# Patient Record
Sex: FEMALE | Race: WHITE | Employment: OTHER | URBAN - NONMETROPOLITAN AREA
[De-identification: names, ages, dates, MRNs, and addresses within clinical notes are randomized per-mention and may not be internally consistent; named-entity substitution may affect disease eponyms.]

---

## 2017-04-13 ENCOUNTER — HOSPITAL ENCOUNTER (OUTPATIENT)
Dept: PHARMACY | Age: 67
Setting detail: THERAPIES SERIES
Discharge: HOME | End: 2017-04-13
Payer: MEDICARE

## 2017-04-13 VITALS
SYSTOLIC BLOOD PRESSURE: 156 MMHG | WEIGHT: 280 LBS | DIASTOLIC BLOOD PRESSURE: 84 MMHG | HEART RATE: 53 BPM | BODY MASS INDEX: 48.39 KG/M2

## 2017-04-13 DIAGNOSIS — Z79.01 LONG TERM CURRENT USE OF ANTICOAGULANT THERAPY: ICD-10-CM

## 2017-04-13 DIAGNOSIS — I26.99 OTHER PULMONARY EMBOLISM WITHOUT ACUTE COR PULMONALE, UNSPECIFIED CHRONICITY (HCC): ICD-10-CM

## 2017-04-13 DIAGNOSIS — I82.4Z9 DEEP VEIN THROMBOSIS (DVT) OF DISTAL VEIN OF LOWER EXTREMITY, UNSPECIFIED CHRONICITY, UNSPECIFIED LATERALITY (HCC): ICD-10-CM

## 2017-04-13 LAB — INR BLD: 2.6

## 2017-04-13 PROCEDURE — G0463 HOSPITAL OUTPT CLINIC VISIT: HCPCS

## 2017-04-13 PROCEDURE — 85610 PROTHROMBIN TIME: CPT

## 2017-05-02 PROBLEM — E66.01 MORBID OBESITY DUE TO EXCESS CALORIES (HCC): Status: ACTIVE | Noted: 2017-05-02

## 2017-05-24 ENCOUNTER — HOSPITAL ENCOUNTER (OUTPATIENT)
Dept: LAB | Age: 67
Discharge: HOME | End: 2017-05-24
Payer: MEDICARE

## 2017-05-24 ENCOUNTER — HOSPITAL ENCOUNTER (OUTPATIENT)
Dept: PHARMACY | Age: 67
Setting detail: THERAPIES SERIES
Discharge: HOME | End: 2017-05-24
Payer: MEDICARE

## 2017-05-24 VITALS — SYSTOLIC BLOOD PRESSURE: 154 MMHG | HEART RATE: 54 BPM | DIASTOLIC BLOOD PRESSURE: 99 MMHG

## 2017-05-24 DIAGNOSIS — E11.9 TYPE 2 DIABETES MELLITUS WITHOUT COMPLICATION, WITHOUT LONG-TERM CURRENT USE OF INSULIN (HCC): Chronic | ICD-10-CM

## 2017-05-24 DIAGNOSIS — Z79.01 LONG TERM CURRENT USE OF ANTICOAGULANT THERAPY: ICD-10-CM

## 2017-05-24 LAB
-: ABNORMAL
AMORPHOUS: ABNORMAL
ANION GAP SERPL CALCULATED.3IONS-SCNC: 13 MMOL/L (ref 9–17)
BACTERIA: ABNORMAL
BILIRUBIN URINE: NEGATIVE
BUN BLDV-MCNC: 20 MG/DL (ref 8–23)
BUN/CREAT BLD: 25 (ref 9–20)
CALCIUM SERPL-MCNC: 10 MG/DL (ref 8.6–10.4)
CASTS UA: ABNORMAL /LPF
CHLORIDE BLD-SCNC: 99 MMOL/L (ref 98–107)
CO2: 26 MMOL/L (ref 20–31)
COLOR: YELLOW
COMMENT UA: ABNORMAL
CREAT SERPL-MCNC: 0.79 MG/DL (ref 0.5–0.9)
CREATININE URINE: 80.8 MG/DL (ref 28–217)
CRYSTALS, UA: ABNORMAL /HPF
EPITHELIAL CELLS UA: ABNORMAL /HPF (ref 0–25)
ESTIMATED AVERAGE GLUCOSE: 134 MG/DL
GFR AFRICAN AMERICAN: >60 ML/MIN
GFR NON-AFRICAN AMERICAN: >60 ML/MIN
GFR SERPL CREATININE-BSD FRML MDRD: ABNORMAL ML/MIN/{1.73_M2}
GFR SERPL CREATININE-BSD FRML MDRD: ABNORMAL ML/MIN/{1.73_M2}
GLUCOSE BLD-MCNC: 132 MG/DL (ref 70–99)
GLUCOSE URINE: NEGATIVE
HBA1C MFR BLD: 6.3 % (ref 4.8–5.9)
INR BLD: 2
KETONES, URINE: NEGATIVE
LEUKOCYTE ESTERASE, URINE: NEGATIVE
MICROALBUMIN/CREAT 24H UR: <12 MG/L
MICROALBUMIN/CREAT UR-RTO: 15 MCG/MG CREAT
MUCUS: ABNORMAL
NITRITE, URINE: NEGATIVE
OTHER OBSERVATIONS UA: ABNORMAL
PH UA: 6.5 (ref 5–9)
POTASSIUM SERPL-SCNC: 4.5 MMOL/L (ref 3.7–5.3)
PROTEIN UA: NEGATIVE
RBC UA: ABNORMAL /HPF (ref 0–2)
RENAL EPITHELIAL, UA: ABNORMAL /HPF
SODIUM BLD-SCNC: 138 MMOL/L (ref 135–144)
SPECIFIC GRAVITY UA: <1.005 (ref 1.01–1.02)
TRICHOMONAS: ABNORMAL
TURBIDITY: CLEAR
URINE HGB: ABNORMAL
UROBILINOGEN, URINE: NORMAL
WBC UA: ABNORMAL /HPF (ref 0–5)
YEAST: ABNORMAL

## 2017-05-24 PROCEDURE — 99211 OFF/OP EST MAY X REQ PHY/QHP: CPT

## 2017-05-24 PROCEDURE — 36415 COLL VENOUS BLD VENIPUNCTURE: CPT

## 2017-05-24 PROCEDURE — 83036 HEMOGLOBIN GLYCOSYLATED A1C: CPT

## 2017-05-24 PROCEDURE — 85610 PROTHROMBIN TIME: CPT

## 2017-05-24 PROCEDURE — 82570 ASSAY OF URINE CREATININE: CPT

## 2017-05-24 PROCEDURE — 81001 URINALYSIS AUTO W/SCOPE: CPT

## 2017-05-24 PROCEDURE — 82043 UR ALBUMIN QUANTITATIVE: CPT

## 2017-05-24 PROCEDURE — 80048 BASIC METABOLIC PNL TOTAL CA: CPT

## 2017-07-05 ENCOUNTER — HOSPITAL ENCOUNTER (OUTPATIENT)
Dept: PHARMACY | Age: 67
Setting detail: THERAPIES SERIES
Discharge: HOME | End: 2017-07-05
Payer: MEDICARE

## 2017-07-05 VITALS
WEIGHT: 289 LBS | BODY MASS INDEX: 49.95 KG/M2 | HEART RATE: 56 BPM | SYSTOLIC BLOOD PRESSURE: 146 MMHG | DIASTOLIC BLOOD PRESSURE: 72 MMHG

## 2017-07-05 DIAGNOSIS — Z79.01 LONG TERM CURRENT USE OF ANTICOAGULANT THERAPY: ICD-10-CM

## 2017-07-05 LAB — INR BLD: 2.2

## 2017-07-05 PROCEDURE — 85610 PROTHROMBIN TIME: CPT

## 2017-07-05 PROCEDURE — 99211 OFF/OP EST MAY X REQ PHY/QHP: CPT

## 2017-08-16 ENCOUNTER — HOSPITAL ENCOUNTER (OUTPATIENT)
Dept: PHARMACY | Age: 67
Setting detail: THERAPIES SERIES
Discharge: HOME | End: 2017-08-16
Payer: MEDICARE

## 2017-08-16 VITALS
DIASTOLIC BLOOD PRESSURE: 79 MMHG | WEIGHT: 292 LBS | SYSTOLIC BLOOD PRESSURE: 130 MMHG | HEART RATE: 58 BPM | BODY MASS INDEX: 50.47 KG/M2

## 2017-08-16 DIAGNOSIS — Z79.01 LONG TERM CURRENT USE OF ANTICOAGULANT THERAPY: ICD-10-CM

## 2017-08-16 LAB — INR BLD: 1.9

## 2017-08-16 PROCEDURE — 99211 OFF/OP EST MAY X REQ PHY/QHP: CPT

## 2017-08-16 PROCEDURE — 85610 PROTHROMBIN TIME: CPT

## 2017-09-21 ENCOUNTER — HOSPITAL ENCOUNTER (OUTPATIENT)
Dept: PHARMACY | Age: 67
Setting detail: THERAPIES SERIES
Discharge: HOME | End: 2017-09-21
Payer: MEDICARE

## 2017-09-21 ENCOUNTER — HOSPITAL ENCOUNTER (OUTPATIENT)
Dept: WOMENS IMAGING | Age: 67
Discharge: HOME | End: 2017-09-21
Payer: MEDICARE

## 2017-09-21 ENCOUNTER — HOSPITAL ENCOUNTER (OUTPATIENT)
Dept: LAB | Age: 67
Discharge: HOME | End: 2017-09-21
Payer: MEDICARE

## 2017-09-21 VITALS
SYSTOLIC BLOOD PRESSURE: 136 MMHG | WEIGHT: 290 LBS | HEART RATE: 68 BPM | DIASTOLIC BLOOD PRESSURE: 79 MMHG | BODY MASS INDEX: 50.12 KG/M2

## 2017-09-21 DIAGNOSIS — Z12.31 SCREENING MAMMOGRAM, ENCOUNTER FOR: ICD-10-CM

## 2017-09-21 DIAGNOSIS — Z79.01 LONG TERM CURRENT USE OF ANTICOAGULANT THERAPY: ICD-10-CM

## 2017-09-21 DIAGNOSIS — E11.9 TYPE 2 DIABETES MELLITUS WITHOUT COMPLICATION, WITHOUT LONG-TERM CURRENT USE OF INSULIN (HCC): Chronic | ICD-10-CM

## 2017-09-21 DIAGNOSIS — I82.4Y3 ACUTE DEEP VEIN THROMBOSIS (DVT) OF PROXIMAL VEIN OF BOTH LOWER EXTREMITIES (HCC): ICD-10-CM

## 2017-09-21 DIAGNOSIS — I26.99 OTHER PULMONARY EMBOLISM WITHOUT ACUTE COR PULMONALE (HCC): ICD-10-CM

## 2017-09-21 LAB
ESTIMATED AVERAGE GLUCOSE: 154 MG/DL
HBA1C MFR BLD: 7 % (ref 4.8–5.9)
INR BLD: 2.1

## 2017-09-21 PROCEDURE — 85610 PROTHROMBIN TIME: CPT

## 2017-09-21 PROCEDURE — G0202 SCR MAMMO BI INCL CAD: HCPCS

## 2017-09-21 PROCEDURE — 83036 HEMOGLOBIN GLYCOSYLATED A1C: CPT

## 2017-09-21 PROCEDURE — 99211 OFF/OP EST MAY X REQ PHY/QHP: CPT

## 2017-09-21 PROCEDURE — 36415 COLL VENOUS BLD VENIPUNCTURE: CPT

## 2017-10-09 ENCOUNTER — HOSPITAL ENCOUNTER (OUTPATIENT)
Dept: PHARMACY | Age: 67
Setting detail: THERAPIES SERIES
Discharge: HOME | End: 2017-10-09
Payer: MEDICARE

## 2017-10-09 VITALS — DIASTOLIC BLOOD PRESSURE: 72 MMHG | SYSTOLIC BLOOD PRESSURE: 132 MMHG | HEART RATE: 73 BPM

## 2017-10-09 DIAGNOSIS — Z79.01 LONG TERM CURRENT USE OF ANTICOAGULANT THERAPY: ICD-10-CM

## 2017-10-09 LAB — INR BLD: 1.3

## 2017-10-09 PROCEDURE — 99211 OFF/OP EST MAY X REQ PHY/QHP: CPT

## 2017-10-09 PROCEDURE — 85610 PROTHROMBIN TIME: CPT

## 2017-10-09 RX ORDER — ASPIRIN 81 MG/1
325 TABLET ORAL DAILY
COMMUNITY
End: 2018-08-28 | Stop reason: CLARIF

## 2017-10-09 NOTE — PATIENT INSTRUCTIONS
Continue current dose of warfarin as instructed on dosing calendar provided. Please take warfarin 7.5 mg today and 7.5 mg tomorrow, then resume previous dosing. Continue Lovenox 40 mg subcutaneously daily thru Thursday. Return to clinic on Friday, 10/13/17 to recheck INR. Continue to monitor urine and stool for signs and symptoms of bleeding. Please notify the clinic of any medication changes.

## 2017-10-09 NOTE — PROGRESS NOTES
Fingerstick INR drawn per clinic protocol. Patient states no visible blood in urine and no black tarry stool. Denies any missed doses of warfarin. No change in other maintenance medications or in diet. Patient had left total knee replacement on Friday, 10/6/17. Patient is taking Percocet 5/325 - 2 tablets every 4 hrs PRN pain. Patient held warfarin 5 days prior to procedure and has been bridging with Lovenox 40 mg SQ daily. INR is 1.3 today. Patient instructed to take warfarin 7.5 mg today and 7.5 mg tomorrow, then resume 5 mg daily Wednesday and Thursday. Patient discharged after surgery with a 5 day supply of Lovenox 40 mg syringes and was instructed to continue Lovenox daily  through Thursday. Patient will return to clinic on Friday, 10/13/17 to recheck INR.

## 2017-10-11 ENCOUNTER — HOSPITAL ENCOUNTER (OUTPATIENT)
Dept: PHYSICAL THERAPY | Age: 67
Setting detail: THERAPIES SERIES
Discharge: HOME | End: 2017-10-11
Payer: MEDICARE

## 2017-10-11 NOTE — PROGRESS NOTES
[facility]  Inpatient/Observation/Outpatient Rehabilitation    Date: 10/11/2017  Patient Name: Zamzam General       [] Inpatient Acute/Observation       [x]  Outpatient  : 1950       [] Pt no showed for scheduled appointment    [] Pt refused/declined therapy at this time due to:           [x] Pt cancelled due to:  [] No Reason Given   [] Sick/ill   [x] Other:  Home Health office called and Pt will be completing therapy via [facility] at this time. She is unable to arrange transportation for outpatient at this time.       Dell Mayer Date: 10/11/2017

## 2017-10-13 ENCOUNTER — HOSPITAL ENCOUNTER (OUTPATIENT)
Dept: PHARMACY | Age: 67
Setting detail: THERAPIES SERIES
Discharge: HOME | End: 2017-10-13
Payer: MEDICARE

## 2017-10-13 VITALS
SYSTOLIC BLOOD PRESSURE: 115 MMHG | DIASTOLIC BLOOD PRESSURE: 64 MMHG | HEART RATE: 61 BPM | WEIGHT: 285 LBS | BODY MASS INDEX: 49.26 KG/M2

## 2017-10-13 DIAGNOSIS — Z79.01 LONG TERM CURRENT USE OF ANTICOAGULANT THERAPY: ICD-10-CM

## 2017-10-13 LAB — INR BLD: 2.1

## 2017-10-13 PROCEDURE — 99211 OFF/OP EST MAY X REQ PHY/QHP: CPT

## 2017-10-13 PROCEDURE — 85610 PROTHROMBIN TIME: CPT

## 2017-10-13 RX ORDER — HYDROCODONE BITARTRATE AND ACETAMINOPHEN 5; 325 MG/1; MG/1
1 TABLET ORAL EVERY 6 HOURS PRN
COMMUNITY
End: 2017-12-22 | Stop reason: ALTCHOICE

## 2017-10-13 RX ORDER — OXYCODONE HYDROCHLORIDE 5 MG/1
5 TABLET ORAL EVERY 4 HOURS PRN
COMMUNITY
End: 2017-11-10 | Stop reason: ALTCHOICE

## 2017-10-13 NOTE — PATIENT INSTRUCTIONS
Continue to monitor urine and stool. Continue to monitor for signs of bleeding. Return to clinic in 4 weeks.

## 2017-10-30 ENCOUNTER — HOSPITAL ENCOUNTER (OUTPATIENT)
Dept: PHYSICAL THERAPY | Age: 67
Setting detail: THERAPIES SERIES
Discharge: HOME | End: 2017-10-30
Payer: MEDICARE

## 2017-10-30 PROCEDURE — 97110 THERAPEUTIC EXERCISES: CPT

## 2017-10-30 PROCEDURE — G0283 ELEC STIM OTHER THAN WOUND: HCPCS

## 2017-10-30 PROCEDURE — G8978 MOBILITY CURRENT STATUS: HCPCS

## 2017-10-30 PROCEDURE — G8979 MOBILITY GOAL STATUS: HCPCS

## 2017-10-30 PROCEDURE — 97161 PT EVAL LOW COMPLEX 20 MIN: CPT

## 2017-10-30 ASSESSMENT — PAIN DESCRIPTION - LOCATION: LOCATION: KNEE

## 2017-10-30 ASSESSMENT — PAIN SCALES - GENERAL: PAINLEVEL_OUTOF10: 1

## 2017-10-30 ASSESSMENT — PAIN DESCRIPTION - ORIENTATION: ORIENTATION: LEFT

## 2017-10-30 NOTE — PROGRESS NOTES
Phone: [address] [facility]          Fax: [phone]                      Outpatient Physical Therapy                                                                      Evaluation  Date: 11/3/2017  Patient: Stefany Lopez  : 1950  [facility] #: [id number]  Referring Practitioner: Stalin Marie MD    Referral Date : 10/06/17     Diagnosis: s/p L TKA    Treatment Diagnosis: L knee pain, difficulty walking  Onset Date: 10/06/17  PT Insurance Information: Medicare/[employer]  Total # of Visits Approved: 18   Total # of Visits to Date: 1  No Show: 0  Canceled Appointment: 0     Subjective  Subjective: Pt arrives to PT evaluation s/p L TKA, which was performed on 10/6/17. Pt states she received home health therapy up until last week. Pt states she has felt as though she is progressing well. Pt states she was using a walker for the first few days after surgery, but has been using a SPC since. Pt states at home she does not use cane often, but cont to take [employer] out in community. Pt states prior to surgery, she was not using AD. Pt states she RTD . Pt states pain has been minimal, with avg pain level being about 1/10. Additional Pertinent Hx: HTN, DM  Pain Screening  Patient Currently in Pain: Yes  Pain Assessment  Pain Assessment: 0-10  Pain Level: 1  Pain Location: Knee  Pain Orientation: Left          Objective     Observation/Palpation  Observation: Pt ambulates with SPC and antalgic gait. Edema: Mod edema L knee compared bilaterally. Strength LLE  Comment: L hip 4/5, L knee 4/5    Assessment  Assessment: Pt is a 78 y/o female who presents to PT s/p L TKA, which was performed on 10/6/17. Pt currently ambulating with SPC and antalgic gait. Pt able to achieve L knee AROM (-)5-103 degrees. L hip and knee 4/5 with MMT.  However, pt demo difficulty with sit to stand transfer and requires use of B UE to assist. Pt will benefit from skilled PT services to address the above impairments and to work toward the established functional goals. Prognosis: Good        Decision Making: Low Complexity    Patient Education  PT POC and HEP. Pt verbalized/demonstrated good understanding:     [x] Yes         [] No, pt required further clarification. [x] Primary Impairment :   G Code:    [x] Mobility         [] Carry        [] Body Position       [] Self Care      [] Other:   Functional Impairment Current:  [] 0%    [] 1-19% [x] 20-39% [] 40-59% [] 60-79%    [] 80-99% [] 100%  Functional Impairment Goal:  [x] 0%    [] 1-19% [] 20-39% [] 40-59% [] 60-79%    [] 80-99% [] 100%  G Code Functional Impairment determined by:  [x] Clinical Judgment   [] Outcome Measure:     Goals  Short term goals  Time Frame for Short term goals: 3 weeks  Short term goal 1: Pt will initiate HEP. Short term goal 2: Pt will improve L knee flexion AROM to 108 degrees to progress toward optimal LE mobility. Long term goals  Time Frame for Long term goals : 6 weeks  Long term goal 1: Pt will be independent and compliant with HEP. Long term goal 2: Pt will demonstrate L knee AROM 0-115 degrees from improved functional mobility. Long term goal 3: Pt will ambulate with proper gait quality without use of AD household and community distances. Long term goal 4: Pt will improve L hip and knee strength to 4+ to 5/5 for ease of stair navigation and sit to stand transfers. Patient goals : \"More flexible. \"        Minutes Tracking:  Time In: 3709  Time Out: [address] [facility]  Minutes: [place], [place], DPT       11/3/2017

## 2017-10-31 ENCOUNTER — HOSPITAL ENCOUNTER (OUTPATIENT)
Dept: PHYSICAL THERAPY | Age: 67
Setting detail: THERAPIES SERIES
Discharge: HOME | End: 2017-10-31
Payer: MEDICARE

## 2017-10-31 PROCEDURE — 97110 THERAPEUTIC EXERCISES: CPT

## 2017-10-31 PROCEDURE — G0283 ELEC STIM OTHER THAN WOUND: HCPCS

## 2017-11-02 ENCOUNTER — HOSPITAL ENCOUNTER (OUTPATIENT)
Dept: PHYSICAL THERAPY | Age: 67
Setting detail: THERAPIES SERIES
Discharge: HOME | End: 2017-11-02
Payer: MEDICARE

## 2017-11-02 PROCEDURE — 97110 THERAPEUTIC EXERCISES: CPT

## 2017-11-02 PROCEDURE — G0283 ELEC STIM OTHER THAN WOUND: HCPCS

## 2017-11-02 NOTE — PROGRESS NOTES
MET                                        []Met   []Partially met  []Not met   Short term goal 2: Pt will improve L knee flexion AROM to 108 degrees to progress toward optimal LE mobility.- MET  []Met   []Partially met  []Not met      []Met   []Partially met  []Not met      []Met   []Partially met  []Not met     Long Term Goals - Time Frame for Long term goals : 6 weeks  Long term goal 1: Pt will be independent and compliant with HEP. []Met  []Partially met  []Not met   Long term goal 2: Pt will demonstrate L knee AROM 0-115 degrees from improved functional mobility. []Met  []Partially met  []Not met   Long term goal 3: Pt will ambulate with proper gait quality without use of AD household and community distances. []Met  []Partially met  []Not met   Long term goal 4: Pt will improve L hip and knee strength to 4+ to 5/5 for ease of stair navigation and sit to stand transfers.   []Met  []Partially met  []Not met     []Met  []Partially met  []Not met       Minutes Tracking:  Time In: 1200  Time Out: [address]bacar Harris  Minutes: [address], [place], DPT  Date: 11/3/2017

## 2017-11-07 ENCOUNTER — APPOINTMENT (OUTPATIENT)
Dept: PHYSICAL THERAPY | Age: 67
End: 2017-11-07
Payer: MEDICARE

## 2017-11-09 ENCOUNTER — HOSPITAL ENCOUNTER (OUTPATIENT)
Dept: PHYSICAL THERAPY | Age: 67
Setting detail: THERAPIES SERIES
Discharge: HOME | End: 2017-11-09
Payer: MEDICARE

## 2017-11-09 PROCEDURE — G0283 ELEC STIM OTHER THAN WOUND: HCPCS

## 2017-11-09 PROCEDURE — 97110 THERAPEUTIC EXERCISES: CPT

## 2017-11-09 NOTE — PROGRESS NOTES
Physical Therapy  Phone: [facility]           Fax: [phone]                           Outpatient Physical Therapy                                                                            Daily Note    Patient: Sulema Kiser : 1950  CSN #: [id number]   Referring Practitioner:  Wali Akbar MD    Referral Date : 10/06/17     Date: 2017    Diagnosis: s/p L TKA  Treatment Diagnosis: L knee pain, difficulty walking    Onset Date: 10/06/17  PT Insurance Information: Medicare/[employer]  Total # of Visits Approved: 18 Per Physician Order  Total # of Visits to Date: 4  No Show: 0  Canceled Appointment: 0      Pre-Treatment Pain:  0/10  Subjective: Denies pain today and feels overall she is doing really well. Exercises:  Exercise 1: Bike x10 mins, hills lv 3  Exercise 2: Heel slides 10x10\" hold  Exercise 3: FSU/LSU 6\" x10ea no UE/minimal UE; SD 4\" x15  Exercise 5: step stretch 3x30\", HS stretch 3x30\"  Exercise 6: starflex SL 4 pl flex 2x15/ ext 3pl x15 DL  Exercise 7: sit to stand from chair x10 no UE  Exercise 8: Sink ex on Airex, minimal UE support PRN x10 each  Exercise 9: TKE GTB x15                      Modalities:      Cryotherapy (Minutes\Location): x15min L knee   E-stim (parameters): IFC+CP [id number] for edema and soreness folllowing exercise         Assessment  Assessment: Pt did well with progression of exercise with decreased use of UEs. Some soreness after progressive exercise, so electrical stimulation with CP was applied to decreased pain and edema. Patient Education  Progressive quad strengthening. Pt verbalized/demonstrated good understanding:     [x] Yes         [] No, pt required further clarification. Post Treatment Pain:  0/10      Plan   Continue progressing functional strength to ease transfers and ADLs.          Goals  (Total # of Visits to Date: 4)   Short Term Goals - Time Frame for Short term goals: 3 weeks     Short term goal 1: Pt will initiate HEP.- MET                                        []Met   []Partially met  []Not met   Short term goal 2: Pt will improve L knee flexion AROM to 108 degrees to progress toward optimal LE mobility.- MET  []Met   []Partially met  []Not met      []Met   []Partially met  []Not met      []Met   []Partially met  []Not met     Long Term Goals - Time Frame for Long term goals : 6 weeks  Long term goal 1: Pt will be independent and compliant with HEP. []Met  []Partially met  []Not met   Long term goal 2: Pt will demonstrate L knee AROM 0-115 degrees from improved functional mobility. []Met  []Partially met  []Not met   Long term goal 3: Pt will ambulate with proper gait quality without use of AD household and community distances. []Met  []Partially met  []Not met   Long term goal 4: Pt will improve L hip and knee strength to 4+ to 5/5 for ease of stair navigation and sit to stand transfers.   []Met  []Partially met  []Not met     []Met  []Partially met  []Not met       Minutes Tracking:  Time In: 1200  Time Out: [address] [employer]  Minutes: [address] Date: 11/9/2017

## 2017-11-10 ENCOUNTER — HOSPITAL ENCOUNTER (OUTPATIENT)
Dept: PHARMACY | Age: 67
Setting detail: THERAPIES SERIES
Discharge: HOME | End: 2017-11-10
Payer: MEDICARE

## 2017-11-10 VITALS
SYSTOLIC BLOOD PRESSURE: 132 MMHG | BODY MASS INDEX: 48.57 KG/M2 | WEIGHT: 281 LBS | DIASTOLIC BLOOD PRESSURE: 63 MMHG | HEART RATE: 59 BPM

## 2017-11-10 DIAGNOSIS — Z79.01 LONG TERM CURRENT USE OF ANTICOAGULANT THERAPY: ICD-10-CM

## 2017-11-10 LAB — INR BLD: 2.7

## 2017-11-10 PROCEDURE — 99211 OFF/OP EST MAY X REQ PHY/QHP: CPT

## 2017-11-10 PROCEDURE — 85610 PROTHROMBIN TIME: CPT

## 2017-11-10 RX ORDER — ACETAMINOPHEN 500 MG
1000 TABLET ORAL EVERY 6 HOURS PRN
COMMUNITY
End: 2020-09-28

## 2017-11-10 NOTE — PROGRESS NOTES
Patient states no visible blood in urine and no black tarry stool. No change in other medications. Will return to clinic in 6 weeks.

## 2017-11-13 ENCOUNTER — HOSPITAL ENCOUNTER (OUTPATIENT)
Dept: PHYSICAL THERAPY | Age: 67
Setting detail: THERAPIES SERIES
Discharge: HOME | End: 2017-11-13
Payer: MEDICARE

## 2017-11-13 PROCEDURE — G0283 ELEC STIM OTHER THAN WOUND: HCPCS

## 2017-11-13 PROCEDURE — 97110 THERAPEUTIC EXERCISES: CPT

## 2017-11-13 NOTE — PROGRESS NOTES
Physical Therapy  Phone: [facility]           Fax: [phone]                           Outpatient Physical Therapy                                                                            Daily Note    Patient: Sulema Kiser : 1950  [facility] #: [id number]   Referring Practitioner:  Wali Akbar MD    Referral Date : 10/06/17     Date: 2017    Diagnosis: s/p L TKA  Treatment Diagnosis: L knee pain, difficulty walking    Onset Date: 10/06/17  PT Insurance Information: Medicare/[employer]  Total # of Visits Approved: 18 Per Physician Order  Total # of Visits to Date: 5  No Show: 0  Canceled Appointment: 0      Pre-Treatment Pain:  0/10  Subjective: Denies pain and feels she is nearly ready to be done with outpatient PT. She is doing everything at home as before, including going up/down steps. She returns to see her doctor on Thursday and will discuss this further with him. Exercises:  Exercise 1: Bike x10 mins, hills lv 3  Exercise 2: Heel slides 10x10\" hold, 90/90 belt stretch 2x30\"  Exercise 3: FSU/LSU 6\" x10ea no UE/minimal UE; SD 6\" x15  Exercise 5: step stretch 3x30\", HS stretch 3x30\", slant gastroc stretch 2x30 sec  Exercise 6: starflex SL 5 pl flex 20x/ ext 4pl x20 DL  Exercise 7: sit to stand from blue bench  x10 no UE  Exercise 8: Sink ex on Airex, minimal UE support PRN x10 each-not today  Exercise 9: TKE GTB x15  Exercise 10: SLS: max L 7\", max R 6\"     Modalities:      Cryotherapy (Minutes\Location): x15min L knee   E-stim (parameters): IFC+CP [id number] for edema and soreness folllowing exercise            Assessment  Assessment: Continues to do well with HEP and with strengthening. A little more tightness into flexion today, but feels heat at home prior to stretching helps this. Patient Education  Functional normal ROMs for ADLs.   Pt verbalized/demonstrated good understanding:     [x] Yes         [] No, pt required further

## 2017-11-15 ENCOUNTER — HOSPITAL ENCOUNTER (OUTPATIENT)
Dept: PHYSICAL THERAPY | Age: 67
Setting detail: THERAPIES SERIES
Discharge: HOME | End: 2017-11-15
Payer: MEDICARE

## 2017-11-15 PROCEDURE — G0283 ELEC STIM OTHER THAN WOUND: HCPCS

## 2017-11-15 PROCEDURE — 97110 THERAPEUTIC EXERCISES: CPT

## 2017-11-15 NOTE — PROGRESS NOTES
Phone: [facility]           Fax: [phone]                           Outpatient Physical Therapy                                                                            Daily Note    Patient: Osmin Shay : 1950  [facility] #: [id number]   Referring Practitioner:  Pedro Mattue MD    Referral Date : 10/06/17     Date: 11/15/2017    Diagnosis: s/p L TKA  Treatment Diagnosis: L knee pain, difficulty walking    Onset Date: 10/06/17  PT Insurance Information: Medicare/[employer]  Total # of Visits Approved: 18 Per Physician Order  Total # of Visits to Date: 6  No Show: 0  Canceled Appointment: 0      Pre-Treatment Pain:  0/10  Subjective: Pt cont to deny pain and states she does not have difficulty with anything at home. Pt states she occasionally gets discomfort behind L knee, but minimal. Pt states she returns to doctor tomorrow and would like to D/C at end of week if good report from doctor. Exercises:  Exercise 1: Bike x10 mins, hills lv 3     Exercise 3: FSU/LSU 6\" x10ea no UE/minimal UE; SD 6\" x15     Exercise 5: step stretch 3x30\", HS stretch 3x30\", slant gastroc stretch 2x30 sec  Exercise 6: starflex SL 5 pl flex 20x/ ext 4pl x20 DL  Exercise 7: sit to stand from mat with foam under feet x15, no UE        Modalities:      Cryotherapy (Minutes\Location): x15min L knee       E-stim (parameters): IFC+CP [id number] for edema and soreness folllowing exercise           Assessment  Assessment: Pt able to achieve 0-110 degrees L knee AROM this date following treatment. Pt able to navigate stairs with 1 handrail and recip pattern. Pt returns to doctor tomorrow, and if follow up goes well, pt would like to D/C at end of week. Patient Education  Educated on cont stretching for knee flexion ROM. Pt verbalized/demonstrated good understanding:     [x] Yes         [] No, pt required further clarification.     Post Treatment Pain:  0/10      Plan  Times per week: 3  Plan weeks: 6      Goals  (Total # of Visits to Date: 6)   Short Term Goals - Time Frame for Short term goals: 3 weeks     Short term goal 1: Pt will initiate HEP.- MET                                        []Met   []Partially met  []Not met   Short term goal 2: Pt will improve L knee flexion AROM to 108 degrees to progress toward optimal LE mobility.- MET  []Met   []Partially met  []Not met      []Met   []Partially met  []Not met      []Met   []Partially met  []Not met     Long Term Goals - Time Frame for Long term goals : 6 weeks  Long term goal 1: Pt will be independent and compliant with HEP. -met. []Met  []Partially met  []Not met   Long term goal 2: Pt will demonstrate L knee AROM 0-115 degrees from improved functional mobility. - progressing []Met  []Partially met  []Not met   Long term goal 3: Pt will ambulate with proper gait quality without use of AD household and community distances. -Met []Met  []Partially met  []Not met   Long term goal 4: Pt will improve L hip and knee strength to 4+ to 5/5 for ease of stair navigation and sit to stand transfers.   []Met  []Partially met  []Not met     []Met  []Partially met  []Not met       Minutes Tracking:  Time In: 1119  Time Out: [place]  Minutes: [address], DPT  Date: 11/15/2017

## 2017-11-17 ENCOUNTER — HOSPITAL ENCOUNTER (OUTPATIENT)
Dept: PHYSICAL THERAPY | Age: 67
Setting detail: THERAPIES SERIES
Discharge: HOME | End: 2017-11-17
Payer: MEDICARE

## 2017-11-17 PROCEDURE — 97110 THERAPEUTIC EXERCISES: CPT

## 2017-11-17 PROCEDURE — G8980 MOBILITY D/C STATUS: HCPCS

## 2017-11-17 PROCEDURE — G8978 MOBILITY CURRENT STATUS: HCPCS

## 2017-11-17 PROCEDURE — G0283 ELEC STIM OTHER THAN WOUND: HCPCS

## 2017-11-17 PROCEDURE — G8979 MOBILITY GOAL STATUS: HCPCS

## 2017-11-17 NOTE — PROGRESS NOTES
clarification. Post Treatment Pain:  0/10      Plan  Times per week: 3  Plan weeks: 6      Goals  (Total # of Visits to Date: 7)   Short Term Goals - Time Frame for Short term goals: 3 weeks     Short term goal 1: Pt will initiate HEP.- MET                                        []Met   []Partially met  []Not met   Short term goal 2: Pt will improve L knee flexion AROM to 108 degrees to progress toward optimal LE mobility.- MET  []Met   []Partially met  []Not met      []Met   []Partially met  []Not met      []Met   []Partially met  []Not met     Long Term Goals - Time Frame for Long term goals : 6 weeks  Long term goal 1: Pt will be independent and compliant with HEP. -met. []Met  []Partially met  []Not met   Long term goal 2: Pt will demonstrate L knee AROM 0-115 degrees from improved functional mobility. - partially  met []Met  []Partially met  []Not met   Long term goal 3: Pt will ambulate with proper gait quality without use of AD household and community distances. -Met []Met  []Partially met  []Not met   Long term goal 4: Pt will improve L hip and knee strength to 4+ to 5/5 for ease of stair navigation and sit to stand transfers. - met []Met  []Partially met  []Not met     []Met  []Partially met  []Not met       Minutes Tracking:  Time In: [address]  Time Out: [address]  Minutes: [address] [place], [place], DPT  Date: 11/17/2017

## 2017-11-20 ENCOUNTER — APPOINTMENT (OUTPATIENT)
Dept: PHYSICAL THERAPY | Age: 67
End: 2017-11-20
Payer: MEDICARE

## 2017-11-22 ENCOUNTER — APPOINTMENT (OUTPATIENT)
Dept: PHYSICAL THERAPY | Age: 67
End: 2017-11-22
Payer: MEDICARE

## 2017-11-24 ENCOUNTER — APPOINTMENT (OUTPATIENT)
Dept: PHYSICAL THERAPY | Age: 67
End: 2017-11-24
Payer: MEDICARE

## 2017-11-27 ENCOUNTER — APPOINTMENT (OUTPATIENT)
Dept: PHYSICAL THERAPY | Age: 67
End: 2017-11-27
Payer: MEDICARE

## 2017-11-29 ENCOUNTER — APPOINTMENT (OUTPATIENT)
Dept: PHYSICAL THERAPY | Age: 67
End: 2017-11-29
Payer: MEDICARE

## 2017-12-22 ENCOUNTER — HOSPITAL ENCOUNTER (OUTPATIENT)
Dept: PHARMACY | Age: 67
Setting detail: THERAPIES SERIES
Discharge: HOME | End: 2017-12-22
Payer: MEDICARE

## 2017-12-22 VITALS
BODY MASS INDEX: 47.7 KG/M2 | HEART RATE: 62 BPM | SYSTOLIC BLOOD PRESSURE: 127 MMHG | WEIGHT: 276 LBS | DIASTOLIC BLOOD PRESSURE: 69 MMHG

## 2017-12-22 DIAGNOSIS — Z79.01 LONG TERM CURRENT USE OF ANTICOAGULANT THERAPY: ICD-10-CM

## 2017-12-22 LAB — INR BLD: 3.2

## 2017-12-22 PROCEDURE — 85610 PROTHROMBIN TIME: CPT

## 2017-12-22 PROCEDURE — 99211 OFF/OP EST MAY X REQ PHY/QHP: CPT

## 2017-12-22 NOTE — PROGRESS NOTES
Patient states no visible blood in urine and no black tarry stool. No change in other medications. Will return to clinic in 6 weeks. Patient was taking Robitussin and Tylenol for a cold and Aleve PM.  Educated patient not to take Aleve due to risk of GI bleed.

## 2017-12-27 NOTE — DISCHARGE SUMMARY
Phone: [facility]          Fax: [phone]                            Outpatient Physical Therapy                                                                    Discharge Summary    Patient: Florecita Bennett  : 1950  [facility] #: [id number]   Referring physician: No admitting provider for patient encounter. Referring Practitioner: Dante Krishnan MD      Diagnosis: s/p L TKA      Date Treatment Initiated: 10/30/17   Date of Last Treatment: 17      PT Visit Information  Onset Date: 10/06/17  PT Insurance Information: Medicare/[employer]  Total # of Visits Approved: 18  Total # of Visits to Date: 7  Plan of Care/Certification Expiration Date: 17  No Show: 0  Canceled Appointment: 0  Progress Note Counter: G-codes 7th visit      Frequency/Duration   3 times per week   6 weeks      Treatment Received  [x]HP/CP      [x]Electrical Stim   [x]Therapeutic Exercise      [x]Gait Training  []Aquatics   []Ultrasound         [x]Patient Education/HEP   []Manual Therapy  []Traction    []Neuro-steve        []Soft Tissue Mobs            []Home TENS  []Iontophoresis    []Orthotic casting/fitting      []Dry Needling    Assessment  Assessment: Pt has completed 7 PT visits with initial evaluation on 10/30/17. At pt's last visit on 17, the following findings were observed: Pt able to achieve 0-110 degrees L knee AROM. MMT LLE grossly 4+/5. Pt able to navigate stairs with 1 handrail and recip pattern. Pt ambulates without AD and min/no antalgia. Pt was then placed on 2 week hold to test compliance with HEP, with plan to D/C if no issues reported. Will now D/C PT episode.        Goals  Short term goals  Time Frame for Short term goals: 3 weeks  Short term goal 1: Pt will initiate HEP.- MET  Short term goal 2: Pt will improve L knee flexion AROM to 108 degrees to progress toward optimal LE mobility.- MET    Long term goals  Time Frame for Long term goals : 6 weeks  Long term

## 2018-02-07 ENCOUNTER — HOSPITAL ENCOUNTER (OUTPATIENT)
Age: 68
Discharge: HOME | End: 2018-02-07
Payer: MEDICARE

## 2018-02-07 ENCOUNTER — HOSPITAL ENCOUNTER (OUTPATIENT)
Dept: PHARMACY | Age: 68
Setting detail: THERAPIES SERIES
Discharge: HOME | End: 2018-02-07
Payer: MEDICARE

## 2018-02-07 VITALS
HEART RATE: 58 BPM | WEIGHT: 279 LBS | DIASTOLIC BLOOD PRESSURE: 70 MMHG | BODY MASS INDEX: 48.22 KG/M2 | SYSTOLIC BLOOD PRESSURE: 139 MMHG

## 2018-02-07 DIAGNOSIS — E11.9 TYPE 2 DIABETES MELLITUS WITHOUT COMPLICATION, WITHOUT LONG-TERM CURRENT USE OF INSULIN (HCC): Chronic | ICD-10-CM

## 2018-02-07 DIAGNOSIS — E78.5 HYPERLIPIDEMIA, UNSPECIFIED HYPERLIPIDEMIA TYPE: ICD-10-CM

## 2018-02-07 DIAGNOSIS — Z79.01 LONG TERM CURRENT USE OF ANTICOAGULANT THERAPY: ICD-10-CM

## 2018-02-07 LAB
ALT SERPL-CCNC: 41 U/L (ref 5–33)
AST SERPL-CCNC: 37 U/L
CHOLESTEROL/HDL RATIO: 3.8
CHOLESTEROL: 136 MG/DL
ESTIMATED AVERAGE GLUCOSE: 151 MG/DL
HBA1C MFR BLD: 6.9 % (ref 4.8–5.9)
HDLC SERPL-MCNC: 36 MG/DL
INR BLD: 2.4
LDL CHOLESTEROL: 49 MG/DL (ref 0–130)
TRIGL SERPL-MCNC: 253 MG/DL
VLDLC SERPL CALC-MCNC: ABNORMAL MG/DL (ref 1–30)

## 2018-02-07 PROCEDURE — 85610 PROTHROMBIN TIME: CPT

## 2018-02-07 PROCEDURE — 80061 LIPID PANEL: CPT

## 2018-02-07 PROCEDURE — 36415 COLL VENOUS BLD VENIPUNCTURE: CPT

## 2018-02-07 PROCEDURE — 99211 OFF/OP EST MAY X REQ PHY/QHP: CPT

## 2018-02-07 PROCEDURE — 83036 HEMOGLOBIN GLYCOSYLATED A1C: CPT

## 2018-02-07 PROCEDURE — 84450 TRANSFERASE (AST) (SGOT): CPT

## 2018-02-07 PROCEDURE — 84460 ALANINE AMINO (ALT) (SGPT): CPT

## 2018-02-07 NOTE — PROGRESS NOTES
Patient states no visible blood in urine and no black tarry stool. No change in other medications. Will return to clinic in 6 weeks. Patient just finished a 10 day course of Augmentin on Sunday (2/4/18). Patient has been taking Tylenol and cough drops. Patient had diarrhea from the Augmentin for which she took Kaopectate.

## 2018-03-26 ENCOUNTER — HOSPITAL ENCOUNTER (OUTPATIENT)
Dept: PHARMACY | Age: 68
Setting detail: THERAPIES SERIES
Discharge: HOME | End: 2018-03-26
Payer: MEDICARE

## 2018-03-26 VITALS
SYSTOLIC BLOOD PRESSURE: 159 MMHG | WEIGHT: 280 LBS | BODY MASS INDEX: 48.39 KG/M2 | DIASTOLIC BLOOD PRESSURE: 87 MMHG | HEART RATE: 64 BPM

## 2018-03-26 DIAGNOSIS — I82.499 DEEP VEIN THROMBOSIS (DVT) OF OTHER VEIN OF LOWER EXTREMITY, UNSPECIFIED CHRONICITY, UNSPECIFIED LATERALITY (HCC): ICD-10-CM

## 2018-03-26 DIAGNOSIS — I26.99 OTHER PULMONARY EMBOLISM WITHOUT ACUTE COR PULMONALE, UNSPECIFIED CHRONICITY (HCC): ICD-10-CM

## 2018-03-26 DIAGNOSIS — Z79.01 LONG TERM CURRENT USE OF ANTICOAGULANT THERAPY: ICD-10-CM

## 2018-03-26 LAB — INR BLD: 1.8

## 2018-03-26 PROCEDURE — 99211 OFF/OP EST MAY X REQ PHY/QHP: CPT

## 2018-03-26 PROCEDURE — 85610 PROTHROMBIN TIME: CPT

## 2018-05-07 ENCOUNTER — HOSPITAL ENCOUNTER (OUTPATIENT)
Dept: PHARMACY | Age: 68
Setting detail: THERAPIES SERIES
Discharge: HOME | End: 2018-05-07
Payer: MEDICARE

## 2018-05-07 VITALS
SYSTOLIC BLOOD PRESSURE: 151 MMHG | WEIGHT: 284 LBS | DIASTOLIC BLOOD PRESSURE: 67 MMHG | HEART RATE: 64 BPM | BODY MASS INDEX: 49.09 KG/M2

## 2018-05-07 DIAGNOSIS — Z79.01 LONG TERM CURRENT USE OF ANTICOAGULANT THERAPY: ICD-10-CM

## 2018-05-07 LAB — INR BLD: 2.2

## 2018-05-07 PROCEDURE — 85610 PROTHROMBIN TIME: CPT

## 2018-05-07 PROCEDURE — 99211 OFF/OP EST MAY X REQ PHY/QHP: CPT

## 2018-06-18 ENCOUNTER — HOSPITAL ENCOUNTER (OUTPATIENT)
Dept: PHARMACY | Age: 68
Setting detail: THERAPIES SERIES
Discharge: HOME | End: 2018-06-18
Payer: MEDICARE

## 2018-06-18 VITALS
SYSTOLIC BLOOD PRESSURE: 161 MMHG | WEIGHT: 288 LBS | HEART RATE: 67 BPM | DIASTOLIC BLOOD PRESSURE: 78 MMHG | BODY MASS INDEX: 49.78 KG/M2

## 2018-06-18 DIAGNOSIS — I82.4Y9 DEEP VEIN THROMBOSIS (DVT) OF PROXIMAL LOWER EXTREMITY, UNSPECIFIED CHRONICITY, UNSPECIFIED LATERALITY (HCC): ICD-10-CM

## 2018-06-18 DIAGNOSIS — Z79.01 LONG TERM CURRENT USE OF ANTICOAGULANT THERAPY: ICD-10-CM

## 2018-06-18 DIAGNOSIS — I26.99 OTHER PULMONARY EMBOLISM WITHOUT ACUTE COR PULMONALE, UNSPECIFIED CHRONICITY (HCC): ICD-10-CM

## 2018-06-18 LAB — INR BLD: 2.5

## 2018-06-18 PROCEDURE — 85610 PROTHROMBIN TIME: CPT

## 2018-06-18 PROCEDURE — 99211 OFF/OP EST MAY X REQ PHY/QHP: CPT

## 2018-06-18 RX ORDER — CHLORAL HYDRATE 500 MG
1000 CAPSULE ORAL 2 TIMES DAILY
COMMUNITY
End: 2018-09-25

## 2018-08-01 ENCOUNTER — HOSPITAL ENCOUNTER (OUTPATIENT)
Dept: PHARMACY | Age: 68
Setting detail: THERAPIES SERIES
Discharge: HOME | End: 2018-08-01
Payer: MEDICARE

## 2018-08-01 VITALS
WEIGHT: 287 LBS | DIASTOLIC BLOOD PRESSURE: 75 MMHG | SYSTOLIC BLOOD PRESSURE: 140 MMHG | HEART RATE: 62 BPM | BODY MASS INDEX: 49.6 KG/M2

## 2018-08-01 DIAGNOSIS — Z79.01 LONG TERM CURRENT USE OF ANTICOAGULANT THERAPY: ICD-10-CM

## 2018-08-01 LAB — INR BLD: 2.3

## 2018-08-01 PROCEDURE — 99211 OFF/OP EST MAY X REQ PHY/QHP: CPT

## 2018-08-01 PROCEDURE — 85610 PROTHROMBIN TIME: CPT

## 2018-08-27 ENCOUNTER — HOSPITAL ENCOUNTER (OUTPATIENT)
Age: 68
Discharge: HOME | End: 2018-08-27
Payer: MEDICARE

## 2018-08-27 DIAGNOSIS — E11.9 TYPE 2 DIABETES MELLITUS WITHOUT COMPLICATION, WITHOUT LONG-TERM CURRENT USE OF INSULIN (HCC): Chronic | ICD-10-CM

## 2018-08-27 LAB
-: ABNORMAL
AMORPHOUS: ABNORMAL
ANION GAP SERPL CALCULATED.3IONS-SCNC: 14 MMOL/L (ref 9–17)
BACTERIA: ABNORMAL
BILIRUBIN URINE: NEGATIVE
BUN BLDV-MCNC: 17 MG/DL (ref 8–23)
BUN/CREAT BLD: 20 (ref 9–20)
CALCIUM SERPL-MCNC: 10.1 MG/DL (ref 8.6–10.4)
CASTS UA: ABNORMAL /LPF
CHLORIDE BLD-SCNC: 99 MMOL/L (ref 98–107)
CO2: 27 MMOL/L (ref 20–31)
COLOR: YELLOW
COMMENT UA: ABNORMAL
CREAT SERPL-MCNC: 0.86 MG/DL (ref 0.5–0.9)
CREATININE URINE: 105.7 MG/DL (ref 28–217)
CRYSTALS, UA: ABNORMAL /HPF
EPITHELIAL CELLS UA: ABNORMAL /HPF (ref 0–25)
GFR AFRICAN AMERICAN: >60 ML/MIN
GFR NON-AFRICAN AMERICAN: >60 ML/MIN
GFR SERPL CREATININE-BSD FRML MDRD: ABNORMAL ML/MIN/{1.73_M2}
GFR SERPL CREATININE-BSD FRML MDRD: ABNORMAL ML/MIN/{1.73_M2}
GLUCOSE FASTING: 163 MG/DL (ref 70–99)
GLUCOSE URINE: NEGATIVE
KETONES, URINE: NEGATIVE
LEUKOCYTE ESTERASE, URINE: NEGATIVE
MICROALBUMIN/CREAT 24H UR: <12 MG/L
MICROALBUMIN/CREAT UR-RTO: NORMAL MCG/MG CREAT
MUCUS: ABNORMAL
NITRITE, URINE: NEGATIVE
OTHER OBSERVATIONS UA: ABNORMAL
PH UA: 6 (ref 5–9)
POTASSIUM SERPL-SCNC: 4.3 MMOL/L (ref 3.7–5.3)
PROTEIN UA: NEGATIVE
RBC UA: ABNORMAL /HPF (ref 0–2)
RENAL EPITHELIAL, UA: ABNORMAL /HPF
SODIUM BLD-SCNC: 140 MMOL/L (ref 135–144)
SPECIFIC GRAVITY UA: 1.01 (ref 1.01–1.02)
TRICHOMONAS: ABNORMAL
TURBIDITY: CLEAR
URINE HGB: ABNORMAL
UROBILINOGEN, URINE: NORMAL
WBC UA: ABNORMAL /HPF (ref 0–5)
YEAST: ABNORMAL

## 2018-08-27 PROCEDURE — 81001 URINALYSIS AUTO W/SCOPE: CPT

## 2018-08-27 PROCEDURE — 82570 ASSAY OF URINE CREATININE: CPT

## 2018-08-27 PROCEDURE — 36415 COLL VENOUS BLD VENIPUNCTURE: CPT

## 2018-08-27 PROCEDURE — 80048 BASIC METABOLIC PNL TOTAL CA: CPT

## 2018-08-27 PROCEDURE — 82043 UR ALBUMIN QUANTITATIVE: CPT

## 2018-08-28 ENCOUNTER — HOSPITAL ENCOUNTER (OUTPATIENT)
Dept: PHARMACY | Age: 68
Setting detail: THERAPIES SERIES
Discharge: HOME | End: 2018-08-28
Payer: MEDICARE

## 2018-08-28 VITALS
WEIGHT: 285 LBS | SYSTOLIC BLOOD PRESSURE: 150 MMHG | BODY MASS INDEX: 49.26 KG/M2 | HEART RATE: 68 BPM | DIASTOLIC BLOOD PRESSURE: 77 MMHG

## 2018-08-28 DIAGNOSIS — I82.593 CHRONIC DEEP VEIN THROMBOSIS (DVT) OF OTHER VEIN OF BOTH LOWER EXTREMITIES (HCC): ICD-10-CM

## 2018-08-28 DIAGNOSIS — Z79.01 LONG TERM CURRENT USE OF ANTICOAGULANT THERAPY: ICD-10-CM

## 2018-08-28 LAB — INR BLD: 2

## 2018-08-28 PROCEDURE — 99212 OFFICE O/P EST SF 10 MIN: CPT

## 2018-08-28 PROCEDURE — 85610 PROTHROMBIN TIME: CPT

## 2018-09-14 ENCOUNTER — ANTI-COAG VISIT (OUTPATIENT)
Dept: PHARMACY | Age: 68
End: 2018-09-14

## 2018-09-14 DIAGNOSIS — Z79.01 LONG TERM CURRENT USE OF ANTICOAGULANT THERAPY: ICD-10-CM

## 2018-09-17 ENCOUNTER — HOSPITAL ENCOUNTER (OUTPATIENT)
Dept: PHARMACY | Age: 68
Setting detail: THERAPIES SERIES
Discharge: HOME | End: 2018-09-17
Payer: MEDICARE

## 2018-09-17 VITALS — HEART RATE: 72 BPM | SYSTOLIC BLOOD PRESSURE: 162 MMHG | DIASTOLIC BLOOD PRESSURE: 90 MMHG

## 2018-09-17 DIAGNOSIS — I82.503 CHRONIC DEEP VEIN THROMBOSIS (DVT) OF BOTH LOWER EXTREMITIES, UNSPECIFIED VEIN (HCC): ICD-10-CM

## 2018-09-17 DIAGNOSIS — I26.99 OTHER PULMONARY EMBOLISM WITHOUT ACUTE COR PULMONALE, UNSPECIFIED CHRONICITY (HCC): ICD-10-CM

## 2018-09-17 DIAGNOSIS — Z79.01 LONG TERM CURRENT USE OF ANTICOAGULANT THERAPY: ICD-10-CM

## 2018-09-17 LAB — INR BLD: 1.3

## 2018-09-17 PROCEDURE — 85610 PROTHROMBIN TIME: CPT

## 2018-09-17 PROCEDURE — 99212 OFFICE O/P EST SF 10 MIN: CPT

## 2018-09-17 NOTE — PATIENT INSTRUCTIONS
Take 10 mg today and tomorrow. Continue to monitor urine and stool. Increase current dose of warfarin as directed. Home health to draw INR Wednesday.

## 2018-09-19 ENCOUNTER — HOSPITAL ENCOUNTER (OUTPATIENT)
Dept: PHARMACY | Age: 68
Setting detail: THERAPIES SERIES
Discharge: HOME | End: 2018-09-19
Payer: MEDICARE

## 2018-09-19 DIAGNOSIS — Z79.01 LONG TERM CURRENT USE OF ANTICOAGULANT THERAPY: ICD-10-CM

## 2018-09-19 LAB — INR BLD: 2.4

## 2018-09-25 ENCOUNTER — HOSPITAL ENCOUNTER (OUTPATIENT)
Dept: PHARMACY | Age: 68
Setting detail: THERAPIES SERIES
Discharge: HOME | End: 2018-09-25
Payer: MEDICARE

## 2018-09-25 VITALS
DIASTOLIC BLOOD PRESSURE: 69 MMHG | SYSTOLIC BLOOD PRESSURE: 131 MMHG | WEIGHT: 273 LBS | HEART RATE: 73 BPM | BODY MASS INDEX: 47.18 KG/M2

## 2018-09-25 DIAGNOSIS — Z79.01 LONG TERM CURRENT USE OF ANTICOAGULANT THERAPY: ICD-10-CM

## 2018-09-25 LAB — INR BLD: 2.9

## 2018-09-25 PROCEDURE — 99211 OFF/OP EST MAY X REQ PHY/QHP: CPT

## 2018-09-25 PROCEDURE — 85610 PROTHROMBIN TIME: CPT

## 2018-09-25 NOTE — PROGRESS NOTES
Patient states no visible blood in urine and no black tarry stool. No change in other medications. Will return to clinic in 5 weeks. Patient had total knee on 9/12/18. Patient will take warfarin 2.5 mg today.

## 2018-10-01 ENCOUNTER — HOSPITAL ENCOUNTER (OUTPATIENT)
Dept: PHYSICAL THERAPY | Age: 68
Setting detail: THERAPIES SERIES
Discharge: HOME | End: 2018-10-01
Payer: MEDICARE

## 2018-10-01 PROCEDURE — G8979 MOBILITY GOAL STATUS: HCPCS

## 2018-10-01 PROCEDURE — 97110 THERAPEUTIC EXERCISES: CPT

## 2018-10-01 PROCEDURE — 97161 PT EVAL LOW COMPLEX 20 MIN: CPT

## 2018-10-01 PROCEDURE — G8978 MOBILITY CURRENT STATUS: HCPCS

## 2018-10-01 PROCEDURE — G0283 ELEC STIM OTHER THAN WOUND: HCPCS

## 2018-10-01 NOTE — PROGRESS NOTES
Phone: [address]          Fax: [phone]                      Outpatient Physical Therapy                                                                      Evaluation  Date: 10/1/2018  Patient: Clare Martinez  : 1950  [facility] #: [id number]  Referring Practitioner: Mayank Dai MD    Referral Date : 18     Diagnosis: s/p right TKA    Treatment Diagnosis: Right knee pain, difficulty walking  Onset Date: 18  PT Insurance Information: Medicare/[employer]  Total # of Visits Approved: 18   Total # of Visits to Date: 1  No Show: 0  Canceled Appointment: 0     Subjective  Subjective: Pt underwent right TKA 18. Was home the next day and started [facility] therapy. Now to begin out-pt PT. Walking without AD when outside the home but otherwise does not use AD. No longer taking pain meds. Swelling is manageable. Pain ranges from 0-3/10. Doing sitting and standing exercises at home. Additional Pertinent Hx: HTN, DM             Objective  Observation/Palpation  Observation: Pt ambulates with SPC and mild antalgic gait. Edema: Min edema right knee compared bilaterally. Strength RLE  Comment: hip and knee grossly 4/5  Strength Other  Other: Little to no ext lag with SLR        Additional Measures  Special Tests: TUG without AD: 18.67 seconds; 8 sit to stands in 30 seconds with arms across chest              Assessment  Assessment: Pt is 78 y/o female with recent right TKA. ROM of right knee: 2-103 degrees in supine. Strength with MMT 4/5 right hip and knee. TUG without AD: 18.67 seconds; 8 sit to stands in 30 seconds with arms across chest. Pt will benefit from PT to address deficits and improve mobility. Prognosis: Good        Decision Making: Low Complexity    Patient Education  PT eval and POC; review of HEP  Pt verbalized/demonstrated good understanding:     [x] Yes         [] No, pt required further clarification.       [x] Primary Impairment :

## 2018-10-09 ENCOUNTER — APPOINTMENT (OUTPATIENT)
Dept: PHYSICAL THERAPY | Age: 68
End: 2018-10-09
Payer: MEDICARE

## 2018-10-11 ENCOUNTER — APPOINTMENT (OUTPATIENT)
Dept: PHYSICAL THERAPY | Age: 68
End: 2018-10-11
Payer: MEDICARE

## 2018-10-12 ENCOUNTER — APPOINTMENT (OUTPATIENT)
Dept: PHYSICAL THERAPY | Age: 68
End: 2018-10-12
Payer: MEDICARE

## 2018-10-15 ENCOUNTER — APPOINTMENT (OUTPATIENT)
Dept: PHYSICAL THERAPY | Age: 68
End: 2018-10-15
Payer: MEDICARE

## 2018-10-17 ENCOUNTER — APPOINTMENT (OUTPATIENT)
Dept: PHYSICAL THERAPY | Age: 68
End: 2018-10-17
Payer: MEDICARE

## 2018-10-19 ENCOUNTER — APPOINTMENT (OUTPATIENT)
Dept: PHYSICAL THERAPY | Age: 68
End: 2018-10-19
Payer: MEDICARE

## 2018-10-22 ENCOUNTER — APPOINTMENT (OUTPATIENT)
Dept: PHYSICAL THERAPY | Age: 68
End: 2018-10-22
Payer: MEDICARE

## 2018-10-24 ENCOUNTER — APPOINTMENT (OUTPATIENT)
Dept: PHYSICAL THERAPY | Age: 68
End: 2018-10-24
Payer: MEDICARE

## 2018-10-26 ENCOUNTER — APPOINTMENT (OUTPATIENT)
Dept: PHYSICAL THERAPY | Age: 68
End: 2018-10-26
Payer: MEDICARE

## 2018-10-29 ENCOUNTER — APPOINTMENT (OUTPATIENT)
Dept: PHYSICAL THERAPY | Age: 68
End: 2018-10-29
Payer: MEDICARE

## 2018-10-31 ENCOUNTER — HOSPITAL ENCOUNTER (OUTPATIENT)
Dept: PHARMACY | Age: 68
Setting detail: THERAPIES SERIES
Discharge: HOME | End: 2018-10-31
Payer: MEDICARE

## 2018-10-31 ENCOUNTER — APPOINTMENT (OUTPATIENT)
Dept: PHYSICAL THERAPY | Age: 68
End: 2018-10-31
Payer: MEDICARE

## 2018-10-31 VITALS
HEART RATE: 65 BPM | SYSTOLIC BLOOD PRESSURE: 151 MMHG | DIASTOLIC BLOOD PRESSURE: 72 MMHG | WEIGHT: 277.4 LBS | BODY MASS INDEX: 47.94 KG/M2

## 2018-10-31 DIAGNOSIS — I82.5Y9 CHRONIC DEEP VEIN THROMBOSIS (DVT) OF PROXIMAL VEIN OF LOWER EXTREMITY, UNSPECIFIED LATERALITY (HCC): ICD-10-CM

## 2018-10-31 DIAGNOSIS — Z79.01 LONG TERM CURRENT USE OF ANTICOAGULANT THERAPY: ICD-10-CM

## 2018-10-31 DIAGNOSIS — I26.99 OTHER PULMONARY EMBOLISM WITHOUT ACUTE COR PULMONALE, UNSPECIFIED CHRONICITY (HCC): ICD-10-CM

## 2018-10-31 LAB — INR BLD: 2

## 2018-10-31 PROCEDURE — 99211 OFF/OP EST MAY X REQ PHY/QHP: CPT | Performed by: FAMILY MEDICINE

## 2018-10-31 PROCEDURE — 85610 PROTHROMBIN TIME: CPT | Performed by: FAMILY MEDICINE

## 2018-10-31 RX ORDER — OMEGA-3 FATTY ACIDS CAP DELAYED RELEASE 1000 MG 1000 MG
1000 CAPSULE DELAYED RELEASE ORAL DAILY
COMMUNITY

## 2018-10-31 NOTE — PROGRESS NOTES
Fingerstick INR drawn per clinic protocol. Patient states no visible blood in urine and no black tarry stool. Denies any missed doses of warfarin. No change in other maintenance medications or in diet. Will continue current warfarin regimen and recheck INR in 6 week(s). Patient is getting flu vaccine at Dr Audie Martino office today. Patient acknowledges working in consult agreement with pharmacist as referred by his/her physician.

## 2018-12-13 ENCOUNTER — HOSPITAL ENCOUNTER (OUTPATIENT)
Dept: PHARMACY | Age: 68
Setting detail: THERAPIES SERIES
Discharge: HOME | End: 2018-12-13
Payer: MEDICARE

## 2018-12-13 VITALS
HEART RATE: 67 BPM | BODY MASS INDEX: 47.88 KG/M2 | SYSTOLIC BLOOD PRESSURE: 154 MMHG | DIASTOLIC BLOOD PRESSURE: 79 MMHG | WEIGHT: 277 LBS

## 2018-12-13 DIAGNOSIS — Z79.01 LONG TERM CURRENT USE OF ANTICOAGULANT THERAPY: ICD-10-CM

## 2018-12-13 LAB — INR BLD: 1.9

## 2018-12-13 PROCEDURE — 85610 PROTHROMBIN TIME: CPT

## 2018-12-13 PROCEDURE — 99211 OFF/OP EST MAY X REQ PHY/QHP: CPT

## 2019-01-24 ENCOUNTER — HOSPITAL ENCOUNTER (OUTPATIENT)
Dept: PHARMACY | Age: 69
Setting detail: THERAPIES SERIES
Discharge: HOME | End: 2019-01-24
Payer: MEDICARE

## 2019-01-24 VITALS
SYSTOLIC BLOOD PRESSURE: 160 MMHG | BODY MASS INDEX: 48.74 KG/M2 | WEIGHT: 282 LBS | DIASTOLIC BLOOD PRESSURE: 93 MMHG | HEART RATE: 63 BPM

## 2019-01-24 DIAGNOSIS — Z79.01 LONG TERM CURRENT USE OF ANTICOAGULANT THERAPY: ICD-10-CM

## 2019-01-24 LAB — INR BLD: 1.6

## 2019-01-24 PROCEDURE — 85610 PROTHROMBIN TIME: CPT

## 2019-01-24 PROCEDURE — 99211 OFF/OP EST MAY X REQ PHY/QHP: CPT

## 2019-03-07 ENCOUNTER — HOSPITAL ENCOUNTER (OUTPATIENT)
Dept: LAB | Age: 69
Discharge: HOME | End: 2019-03-07
Payer: MEDICARE

## 2019-03-07 ENCOUNTER — HOSPITAL ENCOUNTER (OUTPATIENT)
Dept: PHARMACY | Age: 69
Setting detail: THERAPIES SERIES
Discharge: HOME | End: 2019-03-07
Payer: MEDICARE

## 2019-03-07 VITALS
HEART RATE: 64 BPM | WEIGHT: 279.8 LBS | DIASTOLIC BLOOD PRESSURE: 92 MMHG | SYSTOLIC BLOOD PRESSURE: 147 MMHG | BODY MASS INDEX: 48.36 KG/M2

## 2019-03-07 DIAGNOSIS — Z79.01 LONG TERM CURRENT USE OF ANTICOAGULANT THERAPY: ICD-10-CM

## 2019-03-07 DIAGNOSIS — I82.503 CHRONIC DEEP VEIN THROMBOSIS (DVT) OF BOTH LOWER EXTREMITIES, UNSPECIFIED VEIN (HCC): ICD-10-CM

## 2019-03-07 DIAGNOSIS — I26.09 OTHER PULMONARY EMBOLISM WITH ACUTE COR PULMONALE, UNSPECIFIED CHRONICITY (HCC): ICD-10-CM

## 2019-03-07 DIAGNOSIS — E11.9 TYPE 2 DIABETES MELLITUS WITHOUT COMPLICATION, WITHOUT LONG-TERM CURRENT USE OF INSULIN (HCC): Chronic | ICD-10-CM

## 2019-03-07 LAB
ALT SERPL-CCNC: 39 U/L (ref 5–33)
AST SERPL-CCNC: 35 U/L
CHOLESTEROL, FASTING: 127 MG/DL
CHOLESTEROL/HDL RATIO: 2.7
HDLC SERPL-MCNC: 47 MG/DL
INR BLD: 1.7
LDL CHOLESTEROL: 38 MG/DL (ref 0–130)
TRIGLYCERIDE, FASTING: 210 MG/DL
VLDLC SERPL CALC-MCNC: ABNORMAL MG/DL (ref 1–30)

## 2019-03-07 PROCEDURE — 80061 LIPID PANEL: CPT

## 2019-03-07 PROCEDURE — 84460 ALANINE AMINO (ALT) (SGPT): CPT

## 2019-03-07 PROCEDURE — 99211 OFF/OP EST MAY X REQ PHY/QHP: CPT | Performed by: FAMILY MEDICINE

## 2019-03-07 PROCEDURE — 36415 COLL VENOUS BLD VENIPUNCTURE: CPT

## 2019-03-07 PROCEDURE — 85610 PROTHROMBIN TIME: CPT | Performed by: FAMILY MEDICINE

## 2019-03-07 PROCEDURE — 84450 TRANSFERASE (AST) (SGOT): CPT

## 2019-03-26 ENCOUNTER — HOSPITAL ENCOUNTER (OUTPATIENT)
Dept: PHARMACY | Age: 69
Setting detail: THERAPIES SERIES
Discharge: HOME | End: 2019-03-26
Payer: MEDICARE

## 2019-03-26 VITALS — SYSTOLIC BLOOD PRESSURE: 156 MMHG | HEART RATE: 62 BPM | DIASTOLIC BLOOD PRESSURE: 81 MMHG

## 2019-03-26 DIAGNOSIS — Z79.01 LONG TERM CURRENT USE OF ANTICOAGULANT THERAPY: ICD-10-CM

## 2019-03-26 LAB — INR BLD: 2.3

## 2019-03-26 PROCEDURE — 85610 PROTHROMBIN TIME: CPT

## 2019-03-26 PROCEDURE — 99211 OFF/OP EST MAY X REQ PHY/QHP: CPT

## 2019-05-07 ENCOUNTER — HOSPITAL ENCOUNTER (OUTPATIENT)
Dept: PHARMACY | Age: 69
Setting detail: THERAPIES SERIES
Discharge: HOME | End: 2019-05-07
Payer: MEDICARE

## 2019-05-07 VITALS
BODY MASS INDEX: 48.64 KG/M2 | DIASTOLIC BLOOD PRESSURE: 72 MMHG | HEART RATE: 59 BPM | WEIGHT: 281.4 LBS | SYSTOLIC BLOOD PRESSURE: 147 MMHG

## 2019-05-07 DIAGNOSIS — Z79.01 LONG TERM CURRENT USE OF ANTICOAGULANT THERAPY: ICD-10-CM

## 2019-05-07 LAB — INR BLD: 2.1

## 2019-05-07 PROCEDURE — 85610 PROTHROMBIN TIME: CPT

## 2019-05-07 PROCEDURE — 99211 OFF/OP EST MAY X REQ PHY/QHP: CPT

## 2019-05-07 NOTE — PROGRESS NOTES
Fingerstick INR drawn per clinic protocol. Patient states no visible blood in urine and no black tarry stool. Denies any missed doses of warfarin. No change in other maintenance medications or in diet. Will continue current warfarin regimen and recheck INR in 5 weeks (before vacation). Patient acknowledges working in consult agreement with pharmacist as referred by his/her physician.

## 2019-06-11 ENCOUNTER — HOSPITAL ENCOUNTER (OUTPATIENT)
Dept: PHARMACY | Age: 69
Setting detail: THERAPIES SERIES
Discharge: HOME | End: 2019-06-11
Payer: MEDICARE

## 2019-06-11 VITALS
DIASTOLIC BLOOD PRESSURE: 83 MMHG | BODY MASS INDEX: 48.53 KG/M2 | SYSTOLIC BLOOD PRESSURE: 143 MMHG | WEIGHT: 280.8 LBS | HEART RATE: 59 BPM

## 2019-06-11 DIAGNOSIS — Z79.01 LONG TERM CURRENT USE OF ANTICOAGULANT THERAPY: ICD-10-CM

## 2019-06-11 LAB — INR BLD: 2

## 2019-06-11 PROCEDURE — 99211 OFF/OP EST MAY X REQ PHY/QHP: CPT

## 2019-06-11 PROCEDURE — 85610 PROTHROMBIN TIME: CPT

## 2019-06-11 NOTE — PROGRESS NOTES
Fingerstick INR drawn per clinic protocol. Patient states no visible blood in urine and no black tarry stool. Denies any missed doses of warfarin. No change in other maintenance medications or in diet. Will continue current warfarin regimen and recheck INR in 6 week(s). Patient acknowledges working in consult agreement with pharmacist as referred by his/her physician.

## 2019-07-23 ENCOUNTER — HOSPITAL ENCOUNTER (OUTPATIENT)
Dept: PHARMACY | Age: 69
Setting detail: THERAPIES SERIES
Discharge: HOME | End: 2019-07-23
Payer: MEDICARE

## 2019-07-23 VITALS
DIASTOLIC BLOOD PRESSURE: 67 MMHG | SYSTOLIC BLOOD PRESSURE: 118 MMHG | BODY MASS INDEX: 48.71 KG/M2 | HEART RATE: 66 BPM | WEIGHT: 281.8 LBS

## 2019-07-23 DIAGNOSIS — Z79.01 LONG TERM CURRENT USE OF ANTICOAGULANT THERAPY: ICD-10-CM

## 2019-07-23 LAB — INR BLD: 2.9

## 2019-07-23 PROCEDURE — 99211 OFF/OP EST MAY X REQ PHY/QHP: CPT

## 2019-07-23 PROCEDURE — 85610 PROTHROMBIN TIME: CPT

## 2019-09-03 ENCOUNTER — HOSPITAL ENCOUNTER (OUTPATIENT)
Dept: PHARMACY | Age: 69
Setting detail: THERAPIES SERIES
Discharge: HOME | End: 2019-09-03
Payer: MEDICARE

## 2019-09-03 VITALS
HEART RATE: 76 BPM | DIASTOLIC BLOOD PRESSURE: 80 MMHG | WEIGHT: 280.6 LBS | SYSTOLIC BLOOD PRESSURE: 135 MMHG | BODY MASS INDEX: 48.5 KG/M2

## 2019-09-03 DIAGNOSIS — Z79.01 LONG TERM CURRENT USE OF ANTICOAGULANT THERAPY: ICD-10-CM

## 2019-09-03 LAB — INR BLD: 2.5

## 2019-09-03 PROCEDURE — 99211 OFF/OP EST MAY X REQ PHY/QHP: CPT

## 2019-09-03 PROCEDURE — 85610 PROTHROMBIN TIME: CPT

## 2019-09-12 ENCOUNTER — HOSPITAL ENCOUNTER (OUTPATIENT)
Dept: LAB | Age: 69
Discharge: HOME | End: 2019-09-12
Payer: MEDICARE

## 2019-09-12 ENCOUNTER — HOSPITAL ENCOUNTER (OUTPATIENT)
Dept: WOMENS IMAGING | Age: 69
Discharge: HOME | End: 2019-09-14
Payer: MEDICARE

## 2019-09-12 DIAGNOSIS — Z12.31 OTHER SCREENING MAMMOGRAM: ICD-10-CM

## 2019-09-12 DIAGNOSIS — E11.9 TYPE 2 DIABETES MELLITUS WITHOUT COMPLICATION, WITHOUT LONG-TERM CURRENT USE OF INSULIN (HCC): ICD-10-CM

## 2019-09-12 DIAGNOSIS — E78.1 PURE HYPERGLYCERIDEMIA: ICD-10-CM

## 2019-09-12 LAB
-: ABNORMAL
AMORPHOUS: ABNORMAL
ANION GAP SERPL CALCULATED.3IONS-SCNC: 16 MMOL/L (ref 9–17)
BACTERIA: ABNORMAL
BILIRUBIN URINE: NEGATIVE
BUN BLDV-MCNC: 21 MG/DL (ref 8–23)
BUN/CREAT BLD: 19 (ref 9–20)
CALCIUM SERPL-MCNC: 10.2 MG/DL (ref 8.6–10.4)
CASTS UA: ABNORMAL /LPF
CHLORIDE BLD-SCNC: 96 MMOL/L (ref 98–107)
CHOLESTEROL, FASTING: 140 MG/DL
CHOLESTEROL/HDL RATIO: 2.9
CO2: 25 MMOL/L (ref 20–31)
COLOR: YELLOW
COMMENT UA: ABNORMAL
CREAT SERPL-MCNC: 1.12 MG/DL (ref 0.5–0.9)
CREATININE URINE: 168.3 MG/DL (ref 28–217)
CRYSTALS, UA: ABNORMAL /HPF
EPITHELIAL CELLS UA: ABNORMAL /HPF (ref 0–25)
GFR AFRICAN AMERICAN: 59 ML/MIN
GFR NON-AFRICAN AMERICAN: 48 ML/MIN
GFR SERPL CREATININE-BSD FRML MDRD: ABNORMAL ML/MIN/{1.73_M2}
GFR SERPL CREATININE-BSD FRML MDRD: ABNORMAL ML/MIN/{1.73_M2}
GLUCOSE FASTING: 136 MG/DL (ref 70–99)
GLUCOSE URINE: NEGATIVE
HDLC SERPL-MCNC: 48 MG/DL
KETONES, URINE: NEGATIVE
LDL CHOLESTEROL: 51 MG/DL (ref 0–130)
LEUKOCYTE ESTERASE, URINE: ABNORMAL
MICROALBUMIN/CREAT 24H UR: 34 MG/L
MICROALBUMIN/CREAT UR-RTO: 20 MCG/MG CREAT
MUCUS: ABNORMAL
NITRITE, URINE: NEGATIVE
OTHER OBSERVATIONS UA: ABNORMAL
PH UA: 5.5 (ref 5–9)
POTASSIUM SERPL-SCNC: 3.9 MMOL/L (ref 3.7–5.3)
PROTEIN UA: NEGATIVE
RBC UA: ABNORMAL /HPF (ref 0–2)
RENAL EPITHELIAL, UA: ABNORMAL /HPF
SODIUM BLD-SCNC: 137 MMOL/L (ref 135–144)
SPECIFIC GRAVITY UA: 1.02 (ref 1.01–1.02)
TRICHOMONAS: ABNORMAL
TRIGLYCERIDE, FASTING: 207 MG/DL
TURBIDITY: CLEAR
URINE HGB: NEGATIVE
UROBILINOGEN, URINE: NORMAL
VLDLC SERPL CALC-MCNC: ABNORMAL MG/DL (ref 1–30)
WBC UA: ABNORMAL /HPF (ref 0–5)
YEAST: ABNORMAL

## 2019-09-12 PROCEDURE — 82043 UR ALBUMIN QUANTITATIVE: CPT

## 2019-09-12 PROCEDURE — 80048 BASIC METABOLIC PNL TOTAL CA: CPT

## 2019-09-12 PROCEDURE — 80061 LIPID PANEL: CPT

## 2019-09-12 PROCEDURE — 36415 COLL VENOUS BLD VENIPUNCTURE: CPT

## 2019-09-12 PROCEDURE — 82570 ASSAY OF URINE CREATININE: CPT

## 2019-09-12 PROCEDURE — 77063 BREAST TOMOSYNTHESIS BI: CPT

## 2019-09-12 PROCEDURE — 81001 URINALYSIS AUTO W/SCOPE: CPT

## 2019-10-02 ENCOUNTER — HOSPITAL ENCOUNTER (OUTPATIENT)
Dept: LAB | Age: 69
Discharge: HOME | End: 2019-10-02
Payer: MEDICARE

## 2019-10-02 DIAGNOSIS — E11.9 TYPE 2 DIABETES MELLITUS WITHOUT COMPLICATION, WITHOUT LONG-TERM CURRENT USE OF INSULIN (HCC): ICD-10-CM

## 2019-10-02 LAB
ANION GAP SERPL CALCULATED.3IONS-SCNC: 17 MMOL/L (ref 9–17)
BUN BLDV-MCNC: 18 MG/DL (ref 8–23)
BUN/CREAT BLD: 18 (ref 9–20)
CALCIUM SERPL-MCNC: 9.8 MG/DL (ref 8.6–10.4)
CHLORIDE BLD-SCNC: 98 MMOL/L (ref 98–107)
CO2: 24 MMOL/L (ref 20–31)
CREAT SERPL-MCNC: 0.98 MG/DL (ref 0.5–0.9)
GFR AFRICAN AMERICAN: >60 ML/MIN
GFR NON-AFRICAN AMERICAN: 56 ML/MIN
GFR SERPL CREATININE-BSD FRML MDRD: ABNORMAL ML/MIN/{1.73_M2}
GFR SERPL CREATININE-BSD FRML MDRD: ABNORMAL ML/MIN/{1.73_M2}
GLUCOSE BLD-MCNC: 135 MG/DL (ref 70–99)
POTASSIUM SERPL-SCNC: 3.8 MMOL/L (ref 3.7–5.3)
SODIUM BLD-SCNC: 139 MMOL/L (ref 135–144)

## 2019-10-02 PROCEDURE — 80048 BASIC METABOLIC PNL TOTAL CA: CPT

## 2019-10-02 PROCEDURE — 36415 COLL VENOUS BLD VENIPUNCTURE: CPT

## 2019-10-16 ENCOUNTER — HOSPITAL ENCOUNTER (OUTPATIENT)
Dept: PHARMACY | Age: 69
Setting detail: THERAPIES SERIES
Discharge: HOME | End: 2019-10-16
Payer: MEDICARE

## 2019-10-16 VITALS
DIASTOLIC BLOOD PRESSURE: 70 MMHG | HEART RATE: 69 BPM | WEIGHT: 280 LBS | SYSTOLIC BLOOD PRESSURE: 141 MMHG | BODY MASS INDEX: 48.39 KG/M2

## 2019-10-16 DIAGNOSIS — Z79.01 LONG TERM CURRENT USE OF ANTICOAGULANT THERAPY: ICD-10-CM

## 2019-10-16 LAB — INR BLD: 2.4

## 2019-10-16 PROCEDURE — 99211 OFF/OP EST MAY X REQ PHY/QHP: CPT

## 2019-10-16 PROCEDURE — 85610 PROTHROMBIN TIME: CPT

## 2019-12-10 ENCOUNTER — HOSPITAL ENCOUNTER (OUTPATIENT)
Dept: PHARMACY | Age: 69
Setting detail: THERAPIES SERIES
Discharge: HOME | End: 2019-12-10
Payer: MEDICARE

## 2019-12-10 VITALS
SYSTOLIC BLOOD PRESSURE: 142 MMHG | HEART RATE: 66 BPM | WEIGHT: 279.8 LBS | BODY MASS INDEX: 48.36 KG/M2 | DIASTOLIC BLOOD PRESSURE: 75 MMHG

## 2019-12-10 DIAGNOSIS — I82.5Z9 CHRONIC DEEP VEIN THROMBOSIS (DVT) OF DISTAL VEIN OF LOWER EXTREMITY, UNSPECIFIED LATERALITY (HCC): ICD-10-CM

## 2019-12-10 DIAGNOSIS — I26.99 PULMONARY EMBOLISM, OTHER, UNSPECIFIED CHRONICITY, UNSPECIFIED WHETHER ACUTE COR PULMONALE PRESENT (HCC): ICD-10-CM

## 2019-12-10 DIAGNOSIS — Z79.01 LONG TERM CURRENT USE OF ANTICOAGULANT THERAPY: ICD-10-CM

## 2019-12-10 LAB — INR BLD: 1.8

## 2019-12-10 PROCEDURE — 85610 PROTHROMBIN TIME: CPT | Performed by: FAMILY MEDICINE

## 2019-12-10 PROCEDURE — 99211 OFF/OP EST MAY X REQ PHY/QHP: CPT | Performed by: FAMILY MEDICINE

## 2020-01-21 ENCOUNTER — HOSPITAL ENCOUNTER (OUTPATIENT)
Dept: PHARMACY | Age: 70
Setting detail: THERAPIES SERIES
Discharge: HOME | End: 2020-01-21
Payer: MEDICARE

## 2020-01-21 VITALS
WEIGHT: 279 LBS | BODY MASS INDEX: 48.22 KG/M2 | DIASTOLIC BLOOD PRESSURE: 78 MMHG | HEART RATE: 63 BPM | SYSTOLIC BLOOD PRESSURE: 162 MMHG

## 2020-01-21 LAB — INR BLD: 1.8

## 2020-01-21 PROCEDURE — 99212 OFFICE O/P EST SF 10 MIN: CPT | Performed by: FAMILY MEDICINE

## 2020-01-21 PROCEDURE — 85610 PROTHROMBIN TIME: CPT | Performed by: FAMILY MEDICINE

## 2020-02-04 ENCOUNTER — HOSPITAL ENCOUNTER (OUTPATIENT)
Dept: PHARMACY | Age: 70
Setting detail: THERAPIES SERIES
Discharge: HOME | End: 2020-02-04
Payer: MEDICARE

## 2020-02-04 VITALS — BODY MASS INDEX: 47.7 KG/M2 | WEIGHT: 276 LBS

## 2020-02-04 LAB — INR BLD: 2.2

## 2020-02-04 PROCEDURE — 85610 PROTHROMBIN TIME: CPT

## 2020-02-04 PROCEDURE — 99211 OFF/OP EST MAY X REQ PHY/QHP: CPT

## 2020-02-04 NOTE — PROGRESS NOTES
Fingerstick INR drawn per clinic protocol. Patient states no visible blood in urine and no black tarry stool. Denies any missed doses of warfarin. No change in other maintenance medications or in diet. Will continue current warfarin regimen of 5 mg daily and recheck INR in 6 week(s). Patient acknowledges working in consult agreement with pharmacist as referred by his/her physician.

## 2020-02-27 ENCOUNTER — HOSPITAL ENCOUNTER (OUTPATIENT)
Dept: LAB | Age: 70
Discharge: HOME | End: 2020-02-27
Payer: MEDICARE

## 2020-02-27 LAB
ALT SERPL-CCNC: 47 U/L (ref 5–33)
AST SERPL-CCNC: 53 U/L
CHOLESTEROL, FASTING: 128 MG/DL
CHOLESTEROL/HDL RATIO: 2.7
HDLC SERPL-MCNC: 47 MG/DL
LDL CHOLESTEROL: 43 MG/DL (ref 0–130)
TRIGLYCERIDE, FASTING: 192 MG/DL
VLDLC SERPL CALC-MCNC: ABNORMAL MG/DL (ref 1–30)

## 2020-02-27 PROCEDURE — 80061 LIPID PANEL: CPT

## 2020-02-27 PROCEDURE — 36415 COLL VENOUS BLD VENIPUNCTURE: CPT

## 2020-02-27 PROCEDURE — 84450 TRANSFERASE (AST) (SGOT): CPT

## 2020-02-27 PROCEDURE — 84460 ALANINE AMINO (ALT) (SGPT): CPT

## 2020-04-27 ENCOUNTER — TELEPHONE (OUTPATIENT)
Dept: PHARMACY | Age: 70
End: 2020-04-27

## 2020-04-28 ENCOUNTER — HOSPITAL ENCOUNTER (OUTPATIENT)
Dept: PHARMACY | Age: 70
Setting detail: THERAPIES SERIES
Discharge: HOME | End: 2020-04-28
Payer: MEDICARE

## 2020-04-28 VITALS — TEMPERATURE: 96.9 F

## 2020-04-28 LAB — INR BLD: 3.2

## 2020-04-28 PROCEDURE — 99211 OFF/OP EST MAY X REQ PHY/QHP: CPT

## 2020-04-28 PROCEDURE — 85610 PROTHROMBIN TIME: CPT

## 2020-06-08 ENCOUNTER — TELEPHONE (OUTPATIENT)
Dept: PHARMACY | Age: 70
End: 2020-06-08

## 2020-06-08 NOTE — TELEPHONE ENCOUNTER
Recent Travel Screening and Travel History documentation:     Travel Screening       Question Response     Do you have any of the following symptoms? None of these     In the last month, have you been in contact with someone who was confirmed or suspected to have Coronavirus / COVID-19? No / Unsure     Have you traveled internationally in the last month? No      Travel History   Travel since 05/08/20     No documented travel since 05/08/20         Patient denies S/S covid/travel screen. Provided instruction for curbside INR check/appointment. Patient states understanding.

## 2020-06-09 ENCOUNTER — HOSPITAL ENCOUNTER (OUTPATIENT)
Dept: PHARMACY | Age: 70
Setting detail: THERAPIES SERIES
Discharge: HOME | End: 2020-06-09
Payer: MEDICARE

## 2020-06-09 VITALS — TEMPERATURE: 98 F

## 2020-06-09 LAB — INR BLD: 2.6

## 2020-06-09 PROCEDURE — 85610 PROTHROMBIN TIME: CPT

## 2020-06-09 PROCEDURE — 99211 OFF/OP EST MAY X REQ PHY/QHP: CPT

## 2020-07-27 ENCOUNTER — TELEPHONE (OUTPATIENT)
Dept: PHARMACY | Age: 70
End: 2020-07-27

## 2020-07-28 ENCOUNTER — HOSPITAL ENCOUNTER (OUTPATIENT)
Dept: PHARMACY | Age: 70
Setting detail: THERAPIES SERIES
Discharge: HOME | End: 2020-07-28
Payer: MEDICARE

## 2020-07-28 VITALS — TEMPERATURE: 97 F

## 2020-07-28 LAB — INR BLD: 3

## 2020-07-28 PROCEDURE — 99211 OFF/OP EST MAY X REQ PHY/QHP: CPT

## 2020-07-28 PROCEDURE — 85610 PROTHROMBIN TIME: CPT

## 2020-07-28 NOTE — PATIENT INSTRUCTIONS
Continue current regimen - warfarin 5 mg daily. Return to clinic in 7 weeks. Continue to monitor urine and stool for signs and symptoms of bleeding. Please notify the clinic of any medication changes.

## 2020-09-14 ENCOUNTER — TELEPHONE (OUTPATIENT)
Dept: PHARMACY | Age: 70
End: 2020-09-14

## 2020-09-14 NOTE — TELEPHONE ENCOUNTER
Recent Travel Screening and Travel History documentation:     Travel Screening     Question   Response    In the last month, have you been in contact with someone who was confirmed or suspected to have Coronavirus / COVID-19? No / Unsure    Do you have any of the following symptoms? None of these    Have you traveled internationally in the last month? No      Travel History   Travel since 08/14/20     No documented travel since 08/14/20         Patient denies s/s covid/travel screen. Provided instruction for curbside INR check/appointment. Patient states understanding.

## 2020-09-15 ENCOUNTER — HOSPITAL ENCOUNTER (OUTPATIENT)
Dept: PHARMACY | Age: 70
Setting detail: THERAPIES SERIES
Discharge: HOME | End: 2020-09-15
Payer: MEDICARE

## 2020-09-15 VITALS — TEMPERATURE: 98.1 F

## 2020-09-15 LAB — INR BLD: 2.6

## 2020-09-15 PROCEDURE — 99213 OFFICE O/P EST LOW 20 MIN: CPT | Performed by: FAMILY MEDICINE

## 2020-09-15 PROCEDURE — 85610 PROTHROMBIN TIME: CPT | Performed by: FAMILY MEDICINE

## 2020-09-15 NOTE — PATIENT INSTRUCTIONS
Continue current dosing of warfarin then stop warfarin on 10/10 (last dose on 10/9). Restart warfarin evening of colonoscopy by taking 5mg warfarin. Please 10mg warfarin (2 tablets) 10/15 and 10/16 then return to your \"regular\" dosing. Begin lovenox injections on [place] 10/12 through morning of Tuesday 10/13. Restart lovenox on 10/16 and continue until return to clinic for INR on 10/20.  Contact clinic with any questions or concerns at [phone]

## 2020-09-15 NOTE — PROGRESS NOTES
Fingerstick INR drawn per clinic protocol. Patient states no visible blood in urine and no black tarry stool. Denies any missed doses of warfarin. No change in other maintenance medications or in diet. Will continue current warfarin regimen and recheck INR in 5 week(s). Patient is having a colonoscopy with Dr Maura Fajardo on 10/14/20 and is required to hold warfarin for 5 days prior to procedure per Dr Nabil Navarro instructions to Renzo Loco. Patient will take last dose of warfarin on 10/9 with no lovenox or warfarin on 10/10. Patient will begin lovenox 120mg SQ BID on 10/11, 10/12 and MORNING of 10/13 only. Patient instructed to restart warfarin 5mg on evening of colonoscopy if Dr Maura Fajardo is agreeable. Patient will take warfarin 10mg 10/15 and 10/16 then resume previously stable dosing of 5mg warfarin daily. Patient will restart lovenox SQ BID on 10/15 and continue until INR check on 10/20. Prescription for Lovenox 120mg syringes 1 syringe SQ BID #15 no refills called to [facility] doctor line at this time. Calendar of instruction provided to Renzo Loco. Patient will contact Dr Maura Fajardo per his request to provide details for hold/bridge. Patient states understanding of instructions. Patient acknowledges working in consult agreement with pharmacist as referred by his/her physician. [facility] VISIT      New referral faxed to HIM at this time.      CLINICAL PHARMACY CONSULT: MED RECONCILIATION/REVIEW ADDENDUM    For Pharmacy Admin Tracking Only    PHSO: Yes  Total # of Interventions Recommended: 1  - New Order #: 1 New Medication Order Reason(s): Needs Additional Medication Therapy  - Maintenance Safety Lab Monitoring #: 1    Total Interventions Accepted: 2  Time Spent (min): [address], [place] [address]

## 2020-09-18 ENCOUNTER — TELEPHONE (OUTPATIENT)
Dept: PHARMACY | Age: 70
End: 2020-09-18

## 2020-09-18 NOTE — TELEPHONE ENCOUNTER
Nadia Marie contacted clinic stating the lovenox injections will cost her $430. Dr Velia Wilson is agreeable to no bridging and patient's risk factors indicate no bridging is acceptable. Patient instructed to continue dosing of 5mg warfarin daily through Saturday 10/10. Patient will hold warfarin 10/11, 10/12 and 10/13. Patient instructed to take warfarin 10mg evening of colonoscopy 10/14 and 10/15 then resume 5mg warfarin daily until INR check on 10/20. Patient states understanding and will convey plans to Dr Velia Wilson.

## 2020-10-07 ENCOUNTER — HOSPITAL ENCOUNTER (OUTPATIENT)
Dept: LAB | Age: 70
Discharge: HOME | End: 2020-10-07
Payer: MEDICARE

## 2020-10-07 LAB
ALBUMIN SERPL-MCNC: 4.6 G/DL (ref 3.5–5.2)
ALBUMIN/GLOBULIN RATIO: 1.2 (ref 1–2.5)
ALP BLD-CCNC: 60 U/L (ref 35–104)
ALT SERPL-CCNC: 46 U/L (ref 5–33)
ANION GAP SERPL CALCULATED.3IONS-SCNC: 14 MMOL/L (ref 9–17)
AST SERPL-CCNC: 46 U/L
BILIRUB SERPL-MCNC: 0.56 MG/DL (ref 0.3–1.2)
BUN BLDV-MCNC: 16 MG/DL (ref 8–23)
BUN/CREAT BLD: 17 (ref 9–20)
CALCIUM SERPL-MCNC: 9.8 MG/DL (ref 8.6–10.4)
CHLORIDE BLD-SCNC: 99 MMOL/L (ref 98–107)
CHOLESTEROL/HDL RATIO: 3
CHOLESTEROL: 139 MG/DL
CO2: 25 MMOL/L (ref 20–31)
CREAT SERPL-MCNC: 0.96 MG/DL (ref 0.5–0.9)
CREATININE URINE: 187.6 MG/DL (ref 28–217)
GFR AFRICAN AMERICAN: >60 ML/MIN
GFR NON-AFRICAN AMERICAN: 58 ML/MIN
GFR SERPL CREATININE-BSD FRML MDRD: ABNORMAL ML/MIN/{1.73_M2}
GFR SERPL CREATININE-BSD FRML MDRD: ABNORMAL ML/MIN/{1.73_M2}
GLUCOSE BLD-MCNC: 158 MG/DL (ref 70–99)
HCT VFR BLD CALC: 36.6 % (ref 36.3–47.1)
HDLC SERPL-MCNC: 46 MG/DL
HEMOGLOBIN: 11.9 G/DL (ref 11.9–15.1)
LDL CHOLESTEROL: 34 MG/DL (ref 0–130)
MCH RBC QN AUTO: 30.7 PG (ref 25.2–33.5)
MCHC RBC AUTO-ENTMCNC: 32.5 G/DL (ref 28.4–34.8)
MCV RBC AUTO: 94.6 FL (ref 82.6–102.9)
MICROALBUMIN/CREAT 24H UR: 22 MG/L
MICROALBUMIN/CREAT UR-RTO: 12 MCG/MG CREAT
NRBC AUTOMATED: 0 PER 100 WBC
PDW BLD-RTO: 15.5 % (ref 11.8–14.4)
PLATELET # BLD: 269 K/UL (ref 138–453)
PMV BLD AUTO: 10 FL (ref 8.1–13.5)
POTASSIUM SERPL-SCNC: 3.7 MMOL/L (ref 3.7–5.3)
RBC # BLD: 3.87 M/UL (ref 3.95–5.11)
SODIUM BLD-SCNC: 138 MMOL/L (ref 135–144)
TOTAL PROTEIN: 8.3 G/DL (ref 6.4–8.3)
TRIGL SERPL-MCNC: 295 MG/DL
VLDLC SERPL CALC-MCNC: ABNORMAL MG/DL (ref 1–30)
WBC # BLD: 7.5 K/UL (ref 3.5–11.3)

## 2020-10-07 PROCEDURE — 82043 UR ALBUMIN QUANTITATIVE: CPT

## 2020-10-07 PROCEDURE — 83036 HEMOGLOBIN GLYCOSYLATED A1C: CPT

## 2020-10-07 PROCEDURE — 82570 ASSAY OF URINE CREATININE: CPT

## 2020-10-07 PROCEDURE — 80053 COMPREHEN METABOLIC PANEL: CPT

## 2020-10-07 PROCEDURE — 36415 COLL VENOUS BLD VENIPUNCTURE: CPT

## 2020-10-07 PROCEDURE — 85027 COMPLETE CBC AUTOMATED: CPT

## 2020-10-07 PROCEDURE — 80061 LIPID PANEL: CPT

## 2020-10-08 LAB
ESTIMATED AVERAGE GLUCOSE: 166 MG/DL
HBA1C MFR BLD: 7.4 % (ref 4–6)

## 2020-10-19 ENCOUNTER — TELEPHONE (OUTPATIENT)
Dept: PHARMACY | Age: 70
End: 2020-10-19

## 2020-10-19 NOTE — TELEPHONE ENCOUNTER
Recent Travel Screening and Travel History documentation:     Travel Screening     Question   Response    In the last month, have you been in contact with someone who was confirmed or suspected to have Coronavirus / COVID-19? Unable to assess    Have you had a COVID-19 viral test in the last 14 days? Unable to assess    Do you have any of the following new or worsening symptoms? Unable to assess    Have you traveled internationally in the last month? Unable to assess      Travel History   Travel since 09/19/20     No documented travel since 09/19/20         Left message for patient and provided instruction for coumadin clinic [facility] INR check/ appointment. Instructed patient to notify clinic if fever or s/s respiratory illness.

## 2020-10-20 ENCOUNTER — HOSPITAL ENCOUNTER (OUTPATIENT)
Dept: PHARMACY | Age: 70
Setting detail: THERAPIES SERIES
Discharge: HOME | End: 2020-10-20
Payer: MEDICARE

## 2020-10-20 VITALS
DIASTOLIC BLOOD PRESSURE: 78 MMHG | BODY MASS INDEX: 43.74 KG/M2 | HEART RATE: 61 BPM | WEIGHT: 271 LBS | TEMPERATURE: 97.2 F | SYSTOLIC BLOOD PRESSURE: 150 MMHG

## 2020-10-20 LAB — INR BLD: 3.2

## 2020-10-20 PROCEDURE — 85610 PROTHROMBIN TIME: CPT | Performed by: FAMILY MEDICINE

## 2020-10-20 PROCEDURE — 99211 OFF/OP EST MAY X REQ PHY/QHP: CPT | Performed by: FAMILY MEDICINE

## 2020-10-20 NOTE — PATIENT INSTRUCTIONS
Please take 2.5mg (1/2 tablet) today then return to your regular dosing. Continue to monitor for signs of bleeding. Return to coumadin clinic in 4 weeks.

## 2020-11-03 ENCOUNTER — HOSPITAL ENCOUNTER (OUTPATIENT)
Dept: WOMENS IMAGING | Age: 70
Discharge: HOME | End: 2020-11-05
Payer: MEDICARE

## 2020-11-03 PROCEDURE — 77067 SCR MAMMO BI INCL CAD: CPT

## 2020-11-16 ENCOUNTER — TELEPHONE (OUTPATIENT)
Dept: PHARMACY | Age: 70
End: 2020-11-16

## 2020-11-16 NOTE — TELEPHONE ENCOUNTER
Recent Travel Screening and Travel History documentation:     Travel Screening     Question   Response    In the last month, have you been in contact with someone who was confirmed or suspected to have Coronavirus / COVID-19? No / Unsure    Have you had a COVID-19 viral test in the last 14 days? No    Do you have any of the following new or worsening symptoms? None of these    Have you traveled internationally in the last month? No      Travel History   Travel since 10/16/20     No documented travel since 10/16/20         Patient denies fever and respiratory symptoms and understands that it will be an office visit INSIDE.     Donato Alvarado, PharmD 11/16/2020 9:39 AM

## 2020-11-17 ENCOUNTER — HOSPITAL ENCOUNTER (OUTPATIENT)
Dept: PHARMACY | Age: 70
Setting detail: THERAPIES SERIES
Discharge: HOME | End: 2020-11-17
Payer: MEDICARE

## 2020-11-17 VITALS
SYSTOLIC BLOOD PRESSURE: 140 MMHG | DIASTOLIC BLOOD PRESSURE: 81 MMHG | WEIGHT: 271 LBS | TEMPERATURE: 97 F | HEART RATE: 66 BPM | BODY MASS INDEX: 43.74 KG/M2

## 2020-11-17 LAB — INR BLD: 2.7

## 2020-11-17 PROCEDURE — 99211 OFF/OP EST MAY X REQ PHY/QHP: CPT

## 2020-11-17 PROCEDURE — 85610 PROTHROMBIN TIME: CPT

## 2020-11-17 NOTE — PROGRESS NOTES
[address] [facility]/[place]  Medication Management  ANTICOAGULATION    Referring Doctor: Ghazala Dao    GOAL INR: 2-3    TODAY'S INR: 2.7    WARFARIN Dosage: Warfarin 5 mg po daily. INR (no units)   Date Value   11/17/2020 2.7   10/20/2020 3.2   09/15/2020 2.6   07/28/2020 3   06/09/2020 2.6   04/28/2020 3.2   02/04/2020 2.2         Notes:    Fingerstick INR drawn per clinic protocol. Patient states no visible blood in urine and no black tarry stool. Denies any missed doses of warfarin. No change in diet. Will recheck INR in 6 weeks. Patient acknowledges working in consult agreement with pharmacist as referred by his/her physician. CLINICAL PHARMACY CONSULT: MED RECONCILIATION/REVIEW ADDENDUM    For Pharmacy Admin Tracking Only    PHSO: Yes  Total # of Interventions Recommended: 1  - Updated Order #: 1 Updated Order Reason(s): Other  - Maintenance Safety Lab Monitoring #: 1  Total Interventions Accepted: 2  Time Spent (min): [address]

## 2020-12-29 ENCOUNTER — HOSPITAL ENCOUNTER (OUTPATIENT)
Dept: PHARMACY | Age: 70
Setting detail: THERAPIES SERIES
Discharge: HOME | End: 2020-12-29
Payer: MEDICARE

## 2020-12-29 VITALS
TEMPERATURE: 97.2 F | SYSTOLIC BLOOD PRESSURE: 113 MMHG | HEART RATE: 65 BPM | WEIGHT: 273 LBS | BODY MASS INDEX: 44.06 KG/M2 | DIASTOLIC BLOOD PRESSURE: 68 MMHG

## 2020-12-29 LAB — INR BLD: 2.6

## 2020-12-29 PROCEDURE — 85610 PROTHROMBIN TIME: CPT

## 2020-12-29 PROCEDURE — 99211 OFF/OP EST MAY X REQ PHY/QHP: CPT

## 2020-12-29 NOTE — PATIENT INSTRUCTIONS
Continue current dose of warfarin as instructed on dosing calendar provided - 5 mg daily. Return to clinic in 6 weeks. Continue to monitor urine and stool for signs and symptoms of bleeding. Please notify the clinic of any medication changes.

## 2020-12-29 NOTE — PROGRESS NOTES
[address] [facility]/[place]  Medication Management  ANTICOAGULATION    Referring Doctor: Joseph Díaz INR: 2-3    TODAY'S INR: 2.6    WARFARIN Dosage: Patient will continue warfarin 5 mg po daily. INR (no units)   Date Value   12/29/2020 2.6   11/17/2020 2.7   10/20/2020 3.2   09/15/2020 2.6   07/28/2020 3   06/09/2020 2.6   04/28/2020 3.2       Notes:    Fingerstick INR drawn per clinic protocol. Patient states no visible blood in urine and no black tarry stool. Denies any missed doses of warfarin. No change in other maintenance medications or in diet. Will recheck INR in 6 weeks. Patient acknowledges working in consult agreement with pharmacist as referred by his/her physician. CLINICAL PHARMACY CONSULT: MED RECONCILIATION/REVIEW ADDENDUM    For Pharmacy Admin Tracking Only    PHSO: Yes  Total # of Interventions Recommended: 1  - Updated Order #: 1 Updated Order Reason(s): Other  - Maintenance Safety Lab Monitoring #: 1  Total Interventions Accepted: 2  Time Spent (min): [address]  [address] [place] [address]

## 2021-02-05 ENCOUNTER — HOSPITAL ENCOUNTER (OUTPATIENT)
Dept: PHARMACY | Age: 71
Setting detail: THERAPIES SERIES
Discharge: HOME | End: 2021-02-05
Payer: MEDICARE

## 2021-02-05 VITALS — BODY MASS INDEX: 43.77 KG/M2 | WEIGHT: 271.2 LBS

## 2021-02-05 DIAGNOSIS — Z79.01 LONG TERM CURRENT USE OF ANTICOAGULANT THERAPY: ICD-10-CM

## 2021-02-05 LAB — INR BLD: 2.6

## 2021-02-05 PROCEDURE — 99211 OFF/OP EST MAY X REQ PHY/QHP: CPT

## 2021-02-05 PROCEDURE — 85610 PROTHROMBIN TIME: CPT

## 2021-02-05 NOTE — PATIENT INSTRUCTIONS
Continue current dose of warfarin as instructed on dosing calendar provided - warfarin 5 mg daily. Return to clinic in 6 weeks. Continue to monitor urine and stool for signs and symptoms of bleeding. Please notify the clinic of any medication changes.

## 2021-03-16 ENCOUNTER — TELEPHONE (OUTPATIENT)
Dept: PHARMACY | Age: 71
End: 2021-03-16

## 2021-03-17 ENCOUNTER — HOSPITAL ENCOUNTER (OUTPATIENT)
Dept: PHARMACY | Age: 71
Setting detail: THERAPIES SERIES
Discharge: HOME | End: 2021-03-17
Payer: MEDICARE

## 2021-03-17 VITALS
HEART RATE: 61 BPM | DIASTOLIC BLOOD PRESSURE: 75 MMHG | BODY MASS INDEX: 43.58 KG/M2 | WEIGHT: 270 LBS | TEMPERATURE: 97.9 F | SYSTOLIC BLOOD PRESSURE: 155 MMHG

## 2021-03-17 DIAGNOSIS — Z79.01 LONG TERM CURRENT USE OF ANTICOAGULANT THERAPY: ICD-10-CM

## 2021-03-17 LAB — INR BLD: 2.5

## 2021-03-17 PROCEDURE — 85610 PROTHROMBIN TIME: CPT

## 2021-03-17 PROCEDURE — 99211 OFF/OP EST MAY X REQ PHY/QHP: CPT

## 2021-03-17 NOTE — PROGRESS NOTES
[address] [place] [address]/[place]  Medication Management  ANTICOAGULATION    Referring Doctor: Ally Hernandez INR: 2-3    TODAY'S INR: 2.5     WARFARIN Dosage: Patient will continue warfarin 5 mg po daily. INR (no units)   Date Value   03/17/2021 2.5   02/05/2021 2.6   12/29/2020 2.6   11/17/2020 2.7   10/20/2020 3.2   09/15/2020 2.6   07/28/2020 3     Medication changes:  None    Notes:    Fingerstick INR drawn per clinic protocol. Patient states no visible blood in urine and no black tarry stool. Denies any missed doses of warfarin. No change in other maintenance medications or in diet. Will recheck INR in 6 weeks. Patient acknowledges working in consult agreement with pharmacist as referred by his/her physician. CLINICAL PHARMACY CONSULT: MED RECONCILIATION/REVIEW ADDENDUM    For Pharmacy Admin Tracking Only    PHSO: No  Total # of Interventions Recommended: 0    - Maintenance Safety Lab Monitoring #: 1  Recommended intervention potential cost savings:   Accepted intervention potential cost savings:    Total Interventions Accepted: 1  Time Spent (min): 15    Perez Drown, PharmD

## 2021-03-26 ENCOUNTER — HOSPITAL ENCOUNTER (OUTPATIENT)
Age: 71
Discharge: HOME | End: 2021-03-26
Payer: MEDICARE

## 2021-03-26 DIAGNOSIS — E11.9 TYPE 2 DIABETES MELLITUS WITHOUT COMPLICATION, WITHOUT LONG-TERM CURRENT USE OF INSULIN (HCC): ICD-10-CM

## 2021-03-26 LAB
ALBUMIN SERPL-MCNC: 4.2 G/DL (ref 3.5–5.2)
ALBUMIN/GLOBULIN RATIO: 1 (ref 1–2.5)
ALP BLD-CCNC: 61 U/L (ref 35–104)
ALT SERPL-CCNC: 38 U/L (ref 5–33)
ANION GAP SERPL CALCULATED.3IONS-SCNC: 14 MMOL/L (ref 9–17)
AST SERPL-CCNC: 39 U/L
BILIRUB SERPL-MCNC: 0.61 MG/DL (ref 0.3–1.2)
BUN BLDV-MCNC: 16 MG/DL (ref 8–23)
BUN/CREAT BLD: 15 (ref 9–20)
CALCIUM SERPL-MCNC: 10.3 MG/DL (ref 8.6–10.4)
CHLORIDE BLD-SCNC: 98 MMOL/L (ref 98–107)
CO2: 25 MMOL/L (ref 20–31)
CREAT SERPL-MCNC: 1.04 MG/DL (ref 0.5–0.9)
GFR AFRICAN AMERICAN: >60 ML/MIN
GFR NON-AFRICAN AMERICAN: 52 ML/MIN
GFR SERPL CREATININE-BSD FRML MDRD: ABNORMAL ML/MIN/{1.73_M2}
GFR SERPL CREATININE-BSD FRML MDRD: ABNORMAL ML/MIN/{1.73_M2}
GLUCOSE BLD-MCNC: 163 MG/DL (ref 70–99)
POTASSIUM SERPL-SCNC: 4.2 MMOL/L (ref 3.7–5.3)
SODIUM BLD-SCNC: 137 MMOL/L (ref 135–144)
TOTAL PROTEIN: 8.5 G/DL (ref 6.4–8.3)

## 2021-03-26 PROCEDURE — 36415 COLL VENOUS BLD VENIPUNCTURE: CPT

## 2021-03-26 PROCEDURE — 80053 COMPREHEN METABOLIC PANEL: CPT

## 2021-03-26 PROCEDURE — 83036 HEMOGLOBIN GLYCOSYLATED A1C: CPT

## 2021-03-27 LAB
ESTIMATED AVERAGE GLUCOSE: 140 MG/DL
HBA1C MFR BLD: 6.5 % (ref 4–6)

## 2021-03-30 PROBLEM — F33.0 MILD EPISODE OF RECURRENT MAJOR DEPRESSIVE DISORDER (HCC): Status: ACTIVE | Noted: 2021-03-30

## 2021-03-30 PROBLEM — D68.59 OTHER PRIMARY THROMBOPHILIA (HCC): Status: ACTIVE | Noted: 2021-03-30

## 2021-04-27 ENCOUNTER — TELEPHONE (OUTPATIENT)
Dept: PHARMACY | Age: 71
End: 2021-04-27

## 2021-04-27 NOTE — TELEPHONE ENCOUNTER
Recent Travel Screening and Travel History documentation:     Travel Screening     Question   Response    In the last month, have you been in contact with someone who was confirmed or suspected to have Coronavirus / COVID-19? No / Unsure    Have you had a COVID-19 viral test in the last 14 days? No    Do you have any of the following new or worsening symptoms? None of these    Have you traveled internationally or domestically in the last month?   No      Travel History   Travel since 03/27/21     No documented travel since 03/27/21         Darren Bro, PharmD 4/27/2021 2:42 PM

## 2021-04-28 ENCOUNTER — HOSPITAL ENCOUNTER (OUTPATIENT)
Dept: PHARMACY | Age: 71
Setting detail: THERAPIES SERIES
Discharge: HOME | End: 2021-04-28
Payer: MEDICARE

## 2021-04-28 VITALS
HEART RATE: 65 BPM | BODY MASS INDEX: 43.74 KG/M2 | SYSTOLIC BLOOD PRESSURE: 148 MMHG | DIASTOLIC BLOOD PRESSURE: 79 MMHG | WEIGHT: 271 LBS

## 2021-04-28 DIAGNOSIS — Z79.01 LONG TERM CURRENT USE OF ANTICOAGULANT THERAPY: ICD-10-CM

## 2021-04-28 LAB — INR BLD: 3.4

## 2021-04-28 PROCEDURE — 85610 PROTHROMBIN TIME: CPT

## 2021-04-28 PROCEDURE — 99211 OFF/OP EST MAY X REQ PHY/QHP: CPT

## 2021-04-28 NOTE — PROGRESS NOTES
Skip [address] [place] [address]/[place]  Medication Management  ANTICOAGULATION    Referring Provider: Maribeth Harrell INR: 2-3    TODAY'S INR: 3.4    WARFARIN Dosage: Patient will continue warfarin 5 mg po daily. Patient will hold warfarin today. INR (no units)   Date Value   04/28/2021 3.4   03/17/2021 2.5   02/05/2021 2.6   12/29/2020 2.6   11/17/2020 2.7   10/20/2020 3.2   09/15/2020 2.6       Notes:    Fingerstick INR drawn per clinic protocol. Patient states no visible blood in urine and no black tarry stool. Denies any missed doses of warfarin. No change in other maintenance medications. Patient states that she may have eaten fewer greens recently (less consistent diet). Will recheck INR in 5 weeks. Patient acknowledges working in consult agreement with pharmacist as referred by his/her physician. CLINICAL PHARMACY CONSULT: MED RECONCILIATION/REVIEW ADDENDUM    For Pharmacy Admin Tracking Only     Intervention Detail: Dose Adjustment: 1: reason: Therapy Optimization   Total # of Interventions Recommended: 1   Total # of Interventions Accepted: 1   Time Spent (min): Darlene Perry

## 2021-06-01 ENCOUNTER — TELEPHONE (OUTPATIENT)
Dept: PHARMACY | Age: 71
End: 2021-06-01

## 2021-06-01 NOTE — TELEPHONE ENCOUNTER
COVID-19 phone screening     Call placed to screen patient prior to upcoming Medication Management visit for Anticoagulation on 6/2/21. Does patient have any of the following symptoms? [] Fever    [] Lower respiratory symptoms (SOB, difficulty breathing, cough)  [] None  Left message    Travel Screening completed.

## 2021-06-15 ENCOUNTER — TELEPHONE (OUTPATIENT)
Dept: PHARMACY | Age: 71
End: 2021-06-15

## 2021-06-15 NOTE — TELEPHONE ENCOUNTER
Recent Travel Screening and Travel History documentation:     Travel Screening     Question   Response    In the last month, have you been in contact with someone who was confirmed or suspected to have Coronavirus / COVID-19? No / Unsure    Have you had a COVID-19 viral test in the last 14 days? No    Do you have any of the following new or worsening symptoms? None of these    Have you traveled internationally or domestically in the last month?   No      Travel History   Travel since 05/15/21     No documented travel since 05/15/21
Never smoker

## 2021-06-16 ENCOUNTER — HOSPITAL ENCOUNTER (OUTPATIENT)
Dept: PHARMACY | Age: 71
Setting detail: THERAPIES SERIES
Discharge: HOME | End: 2021-06-16
Payer: MEDICARE

## 2021-06-16 VITALS
BODY MASS INDEX: 44.06 KG/M2 | SYSTOLIC BLOOD PRESSURE: 152 MMHG | HEART RATE: 62 BPM | WEIGHT: 273 LBS | DIASTOLIC BLOOD PRESSURE: 72 MMHG

## 2021-06-16 DIAGNOSIS — Z79.01 LONG TERM CURRENT USE OF ANTICOAGULANT THERAPY: Primary | ICD-10-CM

## 2021-06-16 LAB — INR BLD: 2.4

## 2021-06-16 PROCEDURE — 99211 OFF/OP EST MAY X REQ PHY/QHP: CPT

## 2021-06-16 PROCEDURE — 85610 PROTHROMBIN TIME: CPT

## 2021-07-27 ENCOUNTER — TELEPHONE (OUTPATIENT)
Dept: PHARMACY | Age: 71
End: 2021-07-27

## 2021-07-28 ENCOUNTER — HOSPITAL ENCOUNTER (OUTPATIENT)
Dept: PHARMACY | Age: 71
Setting detail: THERAPIES SERIES
Discharge: HOME | End: 2021-07-28
Payer: MEDICARE

## 2021-07-28 VITALS
BODY MASS INDEX: 43.26 KG/M2 | WEIGHT: 268 LBS | HEART RATE: 68 BPM | DIASTOLIC BLOOD PRESSURE: 84 MMHG | SYSTOLIC BLOOD PRESSURE: 138 MMHG

## 2021-07-28 DIAGNOSIS — Z79.01 LONG TERM CURRENT USE OF ANTICOAGULANT THERAPY: Primary | ICD-10-CM

## 2021-07-28 LAB — INR BLD: 2.6

## 2021-07-28 PROCEDURE — 99211 OFF/OP EST MAY X REQ PHY/QHP: CPT

## 2021-07-28 PROCEDURE — 85610 PROTHROMBIN TIME: CPT

## 2021-08-11 ENCOUNTER — ANTI-COAG VISIT (OUTPATIENT)
Dept: PHARMACY | Age: 71
End: 2021-08-11

## 2021-08-11 DIAGNOSIS — Z79.01 LONG TERM CURRENT USE OF ANTICOAGULANT THERAPY: Primary | ICD-10-CM

## 2021-09-07 ENCOUNTER — TELEPHONE (OUTPATIENT)
Dept: PHARMACY | Age: 71
End: 2021-09-07

## 2021-09-08 ENCOUNTER — HOSPITAL ENCOUNTER (OUTPATIENT)
Dept: PHARMACY | Age: 71
Setting detail: THERAPIES SERIES
Discharge: HOME | End: 2021-09-08
Payer: MEDICARE

## 2021-09-08 VITALS
WEIGHT: 269 LBS | HEART RATE: 59 BPM | DIASTOLIC BLOOD PRESSURE: 78 MMHG | SYSTOLIC BLOOD PRESSURE: 158 MMHG | BODY MASS INDEX: 43.42 KG/M2

## 2021-09-08 DIAGNOSIS — Z79.01 LONG TERM CURRENT USE OF ANTICOAGULANT THERAPY: Primary | ICD-10-CM

## 2021-09-08 LAB — INR BLD: 3.2

## 2021-09-08 PROCEDURE — 99211 OFF/OP EST MAY X REQ PHY/QHP: CPT

## 2021-09-08 PROCEDURE — 85610 PROTHROMBIN TIME: CPT

## 2021-09-08 NOTE — PATIENT INSTRUCTIONS
Please hold warfarin today. Continue current dose of warfarin as instructed on dosing calendar provided - 5 mg daily. Return to clinic in 4 weeks. Continue to monitor urine and stool for signs and symptoms of bleeding.

## 2021-09-08 NOTE — PROGRESS NOTES
Skip [address] [place] [address]/[place]  Medication Management  ANTICOAGULATION    Referring Provider: Iman Morning INR: 2-3    TODAY'S INR: 3.2    WARFARIN Dosage: Continue warfarin 5 mg po daily. Patient will hold warfarin today for 1 dose . INR (no units)   Date Value   09/08/2021 3.2   07/28/2021 2.6   06/16/2021 2.4   04/28/2021 3.4   03/17/2021 2.5   02/05/2021 2.6   12/29/2020 2.6       Medication changes:  No changes  Notes:    Fingerstick INR drawn per clinic protocol. Patient states no visible blood in urine and no black tarry stool. Denies any missed doses of warfarin. No change in other maintenance medications or in diet. Patient states that she has not been sleeping well and has appointment with sleep specialist in [facility]. Will recheck INR in 4 weeks. Patient acknowledges working in consult agreement with pharmacist as referred by his/her physician. For Pharmacy Admin Tracking Only     Intervention Detail: Dose Adjustment: 1, reason: Therapy Optimization   Total # of Interventions Recommended: 1   Total # of Interventions Accepted: 1   Time Spent (min): [address]  [place], [facility]

## 2021-09-29 ENCOUNTER — HOSPITAL ENCOUNTER (OUTPATIENT)
Age: 71
Discharge: HOME | End: 2021-09-29
Payer: MEDICARE

## 2021-09-29 DIAGNOSIS — Z11.59 ENCOUNTER FOR HEPATITIS C SCREENING TEST FOR LOW RISK PATIENT: ICD-10-CM

## 2021-09-29 DIAGNOSIS — E11.9 TYPE 2 DIABETES MELLITUS WITHOUT COMPLICATION, WITHOUT LONG-TERM CURRENT USE OF INSULIN (HCC): ICD-10-CM

## 2021-09-29 LAB
ALBUMIN SERPL-MCNC: 4.6 G/DL (ref 3.5–5.2)
ALBUMIN/GLOBULIN RATIO: 1.4 (ref 1–2.5)
ALP BLD-CCNC: 62 U/L (ref 35–104)
ALT SERPL-CCNC: 39 U/L (ref 5–33)
ANION GAP SERPL CALCULATED.3IONS-SCNC: 15 MMOL/L (ref 9–17)
AST SERPL-CCNC: 43 U/L
BILIRUB SERPL-MCNC: 0.64 MG/DL (ref 0.3–1.2)
BUN BLDV-MCNC: 18 MG/DL (ref 8–23)
BUN/CREAT BLD: 21 (ref 9–20)
CALCIUM SERPL-MCNC: 10.2 MG/DL (ref 8.6–10.4)
CHLORIDE BLD-SCNC: 99 MMOL/L (ref 98–107)
CO2: 21 MMOL/L (ref 20–31)
CREAT SERPL-MCNC: 0.85 MG/DL (ref 0.5–0.9)
CREATININE URINE: 127.2 MG/DL (ref 28–217)
GFR AFRICAN AMERICAN: >60 ML/MIN
GFR NON-AFRICAN AMERICAN: >60 ML/MIN
GFR SERPL CREATININE-BSD FRML MDRD: ABNORMAL ML/MIN/{1.73_M2}
GFR SERPL CREATININE-BSD FRML MDRD: ABNORMAL ML/MIN/{1.73_M2}
GLUCOSE BLD-MCNC: 171 MG/DL (ref 70–99)
HCT VFR BLD CALC: 37.2 % (ref 36.3–47.1)
HEMOGLOBIN: 11.8 G/DL (ref 11.9–15.1)
MCH RBC QN AUTO: 30.3 PG (ref 25.2–33.5)
MCHC RBC AUTO-ENTMCNC: 31.7 G/DL (ref 28.4–34.8)
MCV RBC AUTO: 95.4 FL (ref 82.6–102.9)
MICROALBUMIN/CREAT 24H UR: 120 MG/L
MICROALBUMIN/CREAT UR-RTO: 94 MCG/MG CREAT
NRBC AUTOMATED: 0 PER 100 WBC
PDW BLD-RTO: 15.7 % (ref 11.8–14.4)
PLATELET # BLD: 271 K/UL (ref 138–453)
PMV BLD AUTO: 10.4 FL (ref 8.1–13.5)
POTASSIUM SERPL-SCNC: 4.1 MMOL/L (ref 3.7–5.3)
RBC # BLD: 3.9 M/UL (ref 3.95–5.11)
SODIUM BLD-SCNC: 135 MMOL/L (ref 135–144)
TOTAL PROTEIN: 7.9 G/DL (ref 6.4–8.3)
WBC # BLD: 7.6 K/UL (ref 3.5–11.3)

## 2021-09-29 PROCEDURE — 82043 UR ALBUMIN QUANTITATIVE: CPT

## 2021-09-29 PROCEDURE — 80061 LIPID PANEL: CPT

## 2021-09-29 PROCEDURE — 83036 HEMOGLOBIN GLYCOSYLATED A1C: CPT

## 2021-09-29 PROCEDURE — 36415 COLL VENOUS BLD VENIPUNCTURE: CPT

## 2021-09-29 PROCEDURE — 82570 ASSAY OF URINE CREATININE: CPT

## 2021-09-29 PROCEDURE — 86803 HEPATITIS C AB TEST: CPT

## 2021-09-29 PROCEDURE — 80053 COMPREHEN METABOLIC PANEL: CPT

## 2021-09-29 PROCEDURE — 85027 COMPLETE CBC AUTOMATED: CPT

## 2021-09-30 LAB
CHOLESTEROL/HDL RATIO: 3.2
CHOLESTEROL: 142 MG/DL
ESTIMATED AVERAGE GLUCOSE: 140 MG/DL
HBA1C MFR BLD: 6.5 % (ref 4–6)
HDLC SERPL-MCNC: 45 MG/DL
HEPATITIS C ANTIBODY: NONREACTIVE
LDL CHOLESTEROL: 46 MG/DL (ref 0–130)
TRIGL SERPL-MCNC: 257 MG/DL
VLDLC SERPL CALC-MCNC: ABNORMAL MG/DL (ref 1–30)

## 2021-10-05 ENCOUNTER — HOSPITAL ENCOUNTER (OUTPATIENT)
Dept: PHARMACY | Age: 71
Setting detail: THERAPIES SERIES
Discharge: HOME | End: 2021-10-05
Payer: MEDICARE

## 2021-10-05 VITALS
WEIGHT: 268.2 LBS | SYSTOLIC BLOOD PRESSURE: 155 MMHG | DIASTOLIC BLOOD PRESSURE: 77 MMHG | HEART RATE: 59 BPM | BODY MASS INDEX: 43.29 KG/M2

## 2021-10-05 DIAGNOSIS — I82.409 DEEP VEIN THROMBOSIS (DVT) OF LOWER EXTREMITY, UNSPECIFIED CHRONICITY, UNSPECIFIED LATERALITY, UNSPECIFIED VEIN (HCC): ICD-10-CM

## 2021-10-05 DIAGNOSIS — I26.99 PULMONARY EMBOLISM, UNSPECIFIED CHRONICITY, UNSPECIFIED PULMONARY EMBOLISM TYPE, UNSPECIFIED WHETHER ACUTE COR PULMONALE PRESENT (HCC): ICD-10-CM

## 2021-10-05 DIAGNOSIS — Z79.01 LONG TERM CURRENT USE OF ANTICOAGULANT THERAPY: Primary | ICD-10-CM

## 2021-10-05 PROBLEM — F33.0 MILD EPISODE OF RECURRENT MAJOR DEPRESSIVE DISORDER (HCC): Status: RESOLVED | Noted: 2021-03-30 | Resolved: 2021-10-05

## 2021-10-05 LAB — INR BLD: 2.3

## 2021-10-05 PROCEDURE — 85610 PROTHROMBIN TIME: CPT | Performed by: FAMILY MEDICINE

## 2021-10-05 PROCEDURE — 99211 OFF/OP EST MAY X REQ PHY/QHP: CPT | Performed by: FAMILY MEDICINE

## 2021-11-15 ENCOUNTER — TELEPHONE (OUTPATIENT)
Dept: PHARMACY | Age: 71
End: 2021-11-15

## 2021-11-15 NOTE — TELEPHONE ENCOUNTER
Recent Travel Screening and Travel History documentation:     Travel Screening     Question   Response    In the last month, have you been in contact with someone who was confirmed or suspected to have Coronavirus / COVID-19? No / Unsure    Have you had a COVID-19 viral test in the last 14 days? No    Do you have any of the following new or worsening symptoms? None of these    Have you traveled internationally or domestically in the last month?   No      Travel History   Travel since 10/15/21    No documented travel since 10/15/21        Pablo RodríguezD 11/15/2021 9:49 AM

## 2021-11-16 ENCOUNTER — HOSPITAL ENCOUNTER (OUTPATIENT)
Dept: PHARMACY | Age: 71
Setting detail: THERAPIES SERIES
Discharge: HOME | End: 2021-11-16
Payer: MEDICARE

## 2021-11-16 VITALS
HEART RATE: 60 BPM | SYSTOLIC BLOOD PRESSURE: 133 MMHG | BODY MASS INDEX: 42.97 KG/M2 | DIASTOLIC BLOOD PRESSURE: 71 MMHG | WEIGHT: 266.2 LBS

## 2021-11-16 DIAGNOSIS — Z79.01 LONG TERM CURRENT USE OF ANTICOAGULANT THERAPY: Primary | ICD-10-CM

## 2021-11-16 DIAGNOSIS — I82.409 DEEP VEIN THROMBOSIS (DVT) OF LOWER EXTREMITY, UNSPECIFIED CHRONICITY, UNSPECIFIED LATERALITY, UNSPECIFIED VEIN (HCC): ICD-10-CM

## 2021-11-16 DIAGNOSIS — I26.99 PULMONARY EMBOLISM, OTHER, UNSPECIFIED CHRONICITY, UNSPECIFIED WHETHER ACUTE COR PULMONALE PRESENT (HCC): ICD-10-CM

## 2021-11-16 LAB — INR BLD: 2

## 2021-11-16 PROCEDURE — 99211 OFF/OP EST MAY X REQ PHY/QHP: CPT | Performed by: FAMILY MEDICINE

## 2021-11-16 PROCEDURE — 85610 PROTHROMBIN TIME: CPT | Performed by: FAMILY MEDICINE

## 2021-11-16 NOTE — PROGRESS NOTES
[address]/[place]  Medication Management  ANTICOAGULATION    Referring Provider: Dr Chelsea Brown INR: 2.0-3.0    TODAY'S INR: 2.0    WARFARIN Dosage: continue 5mg daily    INR (no units)   Date Value   2021 2   10/05/2021 2.3   2021 3.2   2021 2.6   2021 2.4   2021 3.4   2021 2.5       Medication changes:  No changes  Notes:    Fingerstick INR drawn per clinic protocol. Patient states no visible blood in urine and no black tarry stool. Denies any missed doses of warfarin. No change in other maintenance medications or in diet. Will recheck INR in 6 weeks. Patient acknowledges working in consult agreement with pharmacist as referred by his/her physician.                   For Pharmacy Admin Tracking Only     Intervention Detail: Adherence Monitorin   Total # of Interventions Recommended: 2   Total # of Interventions Accepted: 2   Time Spent (min): 30      Mayra Cyr R.Ph., 2021,10:56 AM

## 2021-11-17 ENCOUNTER — HOSPITAL ENCOUNTER (OUTPATIENT)
Dept: MAMMOGRAPHY | Age: 71
Discharge: HOME | End: 2021-11-19
Payer: MEDICARE

## 2021-11-17 DIAGNOSIS — Z12.31 SCREENING MAMMOGRAM FOR HIGH-RISK PATIENT: ICD-10-CM

## 2021-11-17 PROCEDURE — 77063 BREAST TOMOSYNTHESIS BI: CPT

## 2022-01-06 ENCOUNTER — HOSPITAL ENCOUNTER (OUTPATIENT)
Dept: PHARMACY | Age: 72
Setting detail: THERAPIES SERIES
Discharge: HOME | End: 2022-01-06
Payer: MEDICARE

## 2022-01-06 VITALS — BODY MASS INDEX: 42.77 KG/M2 | WEIGHT: 265 LBS

## 2022-01-06 DIAGNOSIS — Z79.01 LONG TERM CURRENT USE OF ANTICOAGULANT THERAPY: Primary | ICD-10-CM

## 2022-01-06 LAB — INR BLD: 2

## 2022-01-06 PROCEDURE — 85610 PROTHROMBIN TIME: CPT

## 2022-01-06 PROCEDURE — 99211 OFF/OP EST MAY X REQ PHY/QHP: CPT

## 2022-01-06 NOTE — PROGRESS NOTES
[address] [facility]/[place]  Medication Management  ANTICOAGULATION    Referring Provider: Hosea Sandoval INR: 2-3    TODAY'S INR: 2    WARFARIN Dosage: Continue warfarin 5 mg po daily    INR (no units)   Date Value   2022 2   2021 2   10/05/2021 2.3   2021 3.2   2021 2.6   2021 2.4   2021 3.4       Medication changes:  No changes  Notes:    Fingerstick INR drawn per clinic protocol. Patient states no visible blood in urine and no black tarry stool. Denies any missed doses of warfarin. No change in other maintenance medications or in diet. Will recheck INR in 6 weeks. Patient acknowledges working in consult agreement with pharmacist as referred by his/her physician. For Pharmacy Admin Tracking Only     Intervention Detail: Adherence Monitorin   Total # of Interventions Recommended: 0   Total # of Interventions Accepted: 0   Time Spent (min): KATT Bryant.  Jessica Long [facility]

## 2022-01-06 NOTE — PATIENT INSTRUCTIONS
Continue current dose of warfarin as instructed on dosing calendar provided. Return to clinic in 6 weeks. Continue to monitor urine and stool for signs and symptoms of bleeding. Please notify the clinic of any medication changes.

## 2022-02-15 ENCOUNTER — HOSPITAL ENCOUNTER (OUTPATIENT)
Dept: PHARMACY | Age: 72
Setting detail: THERAPIES SERIES
Discharge: HOME | End: 2022-02-15
Payer: MEDICARE

## 2022-02-15 VITALS
WEIGHT: 267 LBS | SYSTOLIC BLOOD PRESSURE: 160 MMHG | HEART RATE: 60 BPM | DIASTOLIC BLOOD PRESSURE: 72 MMHG | BODY MASS INDEX: 43.09 KG/M2

## 2022-02-15 DIAGNOSIS — Z79.01 LONG TERM CURRENT USE OF ANTICOAGULANT THERAPY: Primary | ICD-10-CM

## 2022-02-15 LAB — INR BLD: 2

## 2022-02-15 PROCEDURE — 99211 OFF/OP EST MAY X REQ PHY/QHP: CPT

## 2022-02-15 PROCEDURE — 85610 PROTHROMBIN TIME: CPT

## 2022-02-15 NOTE — PATIENT INSTRUCTIONS
Continue to monitor urine and stool. Continue to monitor for signs of bleeding. Return to clinic in 6 weeks. Continue warfarin 5 mg tablet daily.

## 2022-02-15 NOTE — PROGRESS NOTES
[employer]  Medication Management  ANTICOAGULATION    Referring Provider: Sylvia Brown     GOAL INR: 2-3     TODAY'S INR: 2.0     WARFARIN Dosage: Continue warfarin 5 mg tablet daily. INR (no units)   Date Value   2022 2   2021 2   10/05/2021 2.3   2021 3.2   2021 2.6   2021 2.4   2021 3.4     Medication changes:  None    Notes:    Fingerstick INR drawn per clinic protocol. Patient states no visible blood in urine and no black tarry stool. Denies any missed doses of warfarin. No change in other maintenance medications or in diet. Will recheck INR in 6 weeks. Patient acknowledges working in consult agreement with pharmacist as referred by his/her physician.                   For Pharmacy Admin Tracking Only     Intervention Detail: Adherence Monitorin   Total # of Interventions Recommended: 0   Total # of Interventions Accepted: 0   Time Spent (min):  Chad Butts, [facility], PharmD

## 2022-03-30 ENCOUNTER — HOSPITAL ENCOUNTER (OUTPATIENT)
Age: 72
Discharge: HOME | End: 2022-03-30
Payer: MEDICARE

## 2022-03-30 DIAGNOSIS — E11.29 TYPE 2 DIABETES MELLITUS WITH MICROALBUMINURIA, WITHOUT LONG-TERM CURRENT USE OF INSULIN (HCC): ICD-10-CM

## 2022-03-30 DIAGNOSIS — R80.9 TYPE 2 DIABETES MELLITUS WITH MICROALBUMINURIA, WITHOUT LONG-TERM CURRENT USE OF INSULIN (HCC): ICD-10-CM

## 2022-03-30 LAB
ALBUMIN SERPL-MCNC: 4.3 G/DL (ref 3.5–5.2)
ALBUMIN/GLOBULIN RATIO: 1.1 (ref 1–2.5)
ALP BLD-CCNC: 58 U/L (ref 35–104)
ALT SERPL-CCNC: 38 U/L (ref 5–33)
ANION GAP SERPL CALCULATED.3IONS-SCNC: 13 MMOL/L (ref 9–17)
AST SERPL-CCNC: 42 U/L
BILIRUB SERPL-MCNC: 0.64 MG/DL (ref 0.3–1.2)
BUN BLDV-MCNC: 20 MG/DL (ref 8–23)
BUN/CREAT BLD: 20 (ref 9–20)
CALCIUM SERPL-MCNC: 10.5 MG/DL (ref 8.6–10.4)
CHLORIDE BLD-SCNC: 100 MMOL/L (ref 98–107)
CO2: 26 MMOL/L (ref 20–31)
CREAT SERPL-MCNC: 1 MG/DL (ref 0.5–0.9)
CREATININE URINE: 217.9 MG/DL (ref 28–217)
GFR AFRICAN AMERICAN: >60 ML/MIN
GFR NON-AFRICAN AMERICAN: 55 ML/MIN
GFR SERPL CREATININE-BSD FRML MDRD: ABNORMAL ML/MIN/{1.73_M2}
GFR SERPL CREATININE-BSD FRML MDRD: ABNORMAL ML/MIN/{1.73_M2}
GLUCOSE BLD-MCNC: 146 MG/DL (ref 70–99)
MICROALBUMIN/CREAT 24H UR: 15 MG/L
MICROALBUMIN/CREAT UR-RTO: 7 MCG/MG CREAT
POTASSIUM SERPL-SCNC: 4.1 MMOL/L (ref 3.7–5.3)
SODIUM BLD-SCNC: 139 MMOL/L (ref 135–144)
TOTAL PROTEIN: 8.2 G/DL (ref 6.4–8.3)

## 2022-03-30 PROCEDURE — 80053 COMPREHEN METABOLIC PANEL: CPT

## 2022-03-30 PROCEDURE — 36415 COLL VENOUS BLD VENIPUNCTURE: CPT

## 2022-03-30 PROCEDURE — 83036 HEMOGLOBIN GLYCOSYLATED A1C: CPT

## 2022-03-30 PROCEDURE — 82570 ASSAY OF URINE CREATININE: CPT

## 2022-03-30 PROCEDURE — 82043 UR ALBUMIN QUANTITATIVE: CPT

## 2022-03-31 LAB
ESTIMATED AVERAGE GLUCOSE: 148 MG/DL
HBA1C MFR BLD: 6.8 % (ref 4–6)

## 2022-04-05 PROBLEM — E11.9 TYPE 2 DIABETES MELLITUS WITHOUT COMPLICATION, WITHOUT LONG-TERM CURRENT USE OF INSULIN (HCC): Status: ACTIVE | Noted: 2022-04-05

## 2022-04-07 ENCOUNTER — HOSPITAL ENCOUNTER (OUTPATIENT)
Dept: PHARMACY | Age: 72
Setting detail: THERAPIES SERIES
Discharge: HOME | End: 2022-04-07
Payer: MEDICARE

## 2022-04-07 VITALS
HEART RATE: 70 BPM | DIASTOLIC BLOOD PRESSURE: 86 MMHG | WEIGHT: 269 LBS | BODY MASS INDEX: 43.42 KG/M2 | SYSTOLIC BLOOD PRESSURE: 163 MMHG

## 2022-04-07 DIAGNOSIS — Z79.01 LONG TERM CURRENT USE OF ANTICOAGULANT THERAPY: Primary | ICD-10-CM

## 2022-04-07 LAB — INR BLD: 2.5

## 2022-04-07 PROCEDURE — 99211 OFF/OP EST MAY X REQ PHY/QHP: CPT

## 2022-04-07 PROCEDURE — 85610 PROTHROMBIN TIME: CPT

## 2022-04-07 NOTE — PROGRESS NOTES
Skip [address] [place] [address]/[place]  Medication Management  ANTICOAGULATION    Referring Provider: Gregory     GOAL INR: 2-3     TODAY'S INR: 2.5     WARFARIN Dosage: Continue warfarin 5 mg tablet daily. INR (no units)   Date Value   02/15/2022 2   2022 2   2021 2   10/05/2021 2.3   2021 3.2   2021 2.6   2021 2.4       Medication changes:  none    Notes:    Fingerstick INR drawn per clinic protocol. Patient states no visible blood in urine and no black tarry stool. Denies any missed doses of warfarin. No change in other maintenance medications or in diet. Will recheck INR in 6 weeks. Patient acknowledges working in consult agreement with pharmacist as referred by his/her physician.                   For Pharmacy Admin Tracking Only     Intervention Detail: Adherence Monitorin   Total # of Interventions Recommended: 0   Total # of Interventions Accepted: 0   Time Spent (min): 3212 Chad Butts, [address], PharmD

## 2022-04-18 ENCOUNTER — HOSPITAL ENCOUNTER (OUTPATIENT)
Age: 72
Discharge: HOME | End: 2022-04-20
Payer: MEDICARE

## 2022-04-18 ENCOUNTER — HOSPITAL ENCOUNTER (OUTPATIENT)
Dept: GENERAL RADIOLOGY | Age: 72
Discharge: HOME | End: 2022-04-20
Payer: MEDICARE

## 2022-04-18 ENCOUNTER — HOSPITAL ENCOUNTER (OUTPATIENT)
Dept: ULTRASOUND IMAGING | Age: 72
Discharge: HOME | End: 2022-04-20
Payer: MEDICARE

## 2022-04-18 DIAGNOSIS — E01.0 THYROMEGALY: ICD-10-CM

## 2022-04-18 DIAGNOSIS — R05.9 COUGH: ICD-10-CM

## 2022-04-18 PROCEDURE — 76536 US EXAM OF HEAD AND NECK: CPT

## 2022-04-18 PROCEDURE — 71046 X-RAY EXAM CHEST 2 VIEWS: CPT

## 2022-05-11 ENCOUNTER — OFFICE VISIT (OUTPATIENT)
Dept: ENT CLINIC | Age: 72
End: 2022-05-11
Payer: MEDICARE

## 2022-05-11 VITALS
SYSTOLIC BLOOD PRESSURE: 132 MMHG | WEIGHT: 268 LBS | DIASTOLIC BLOOD PRESSURE: 84 MMHG | OXYGEN SATURATION: 96 % | HEART RATE: 64 BPM | RESPIRATION RATE: 18 BRPM | BODY MASS INDEX: 43.07 KG/M2 | HEIGHT: 66 IN

## 2022-05-11 DIAGNOSIS — E04.2 MULTINODULAR GOITER: Primary | ICD-10-CM

## 2022-05-11 DIAGNOSIS — Z79.01 LONG TERM CURRENT USE OF ANTICOAGULANT THERAPY: ICD-10-CM

## 2022-05-11 PROCEDURE — 4040F PNEUMOC VAC/ADMIN/RCVD: CPT | Performed by: OTOLARYNGOLOGY

## 2022-05-11 PROCEDURE — G8399 PT W/DXA RESULTS DOCUMENT: HCPCS | Performed by: OTOLARYNGOLOGY

## 2022-05-11 PROCEDURE — G8417 CALC BMI ABV UP PARAM F/U: HCPCS | Performed by: OTOLARYNGOLOGY

## 2022-05-11 PROCEDURE — 1123F ACP DISCUSS/DSCN MKR DOCD: CPT | Performed by: OTOLARYNGOLOGY

## 2022-05-11 PROCEDURE — 1036F TOBACCO NON-USER: CPT | Performed by: OTOLARYNGOLOGY

## 2022-05-11 PROCEDURE — 99203 OFFICE O/P NEW LOW 30 MIN: CPT | Performed by: OTOLARYNGOLOGY

## 2022-05-11 PROCEDURE — 1090F PRES/ABSN URINE INCON ASSESS: CPT | Performed by: OTOLARYNGOLOGY

## 2022-05-11 PROCEDURE — G8427 DOCREV CUR MEDS BY ELIG CLIN: HCPCS | Performed by: OTOLARYNGOLOGY

## 2022-05-11 PROCEDURE — 3017F COLORECTAL CA SCREEN DOC REV: CPT | Performed by: OTOLARYNGOLOGY

## 2022-05-11 ASSESSMENT — ENCOUNTER SYMPTOMS
RESPIRATORY NEGATIVE: 1
ALLERGIC/IMMUNOLOGIC NEGATIVE: 1
EYES NEGATIVE: 1
GASTROINTESTINAL NEGATIVE: 1
ABDOMINAL DISTENTION: 0

## 2022-05-11 NOTE — PATIENT INSTRUCTIONS
SURVEY:    You may be receiving a survey from [employer] regarding your visit today. Please complete the survey to enable us to provide the highest quality of care to you and your family. If you cannot score us a very good on any question, please call the office to discuss how we could have made your experience a very good one. Thank you.   [address] [place] [address]  Daren Erm, MD Rico Bookbinder, MD Eloisa Sia, MD Bernetta Goodell, MD Lenord Cleverly, MD Troy Bottcher, AuD [facility] [place] [address]  Solange Mccain [facility]  [place], [place]

## 2022-05-19 ENCOUNTER — HOSPITAL ENCOUNTER (OUTPATIENT)
Dept: PHARMACY | Age: 72
Setting detail: THERAPIES SERIES
Discharge: HOME | End: 2022-05-19
Payer: MEDICARE

## 2022-05-19 VITALS
WEIGHT: 270 LBS | BODY MASS INDEX: 43.58 KG/M2 | DIASTOLIC BLOOD PRESSURE: 94 MMHG | HEART RATE: 61 BPM | SYSTOLIC BLOOD PRESSURE: 160 MMHG

## 2022-05-19 DIAGNOSIS — Z79.01 LONG TERM CURRENT USE OF ANTICOAGULANT THERAPY: Primary | ICD-10-CM

## 2022-05-19 LAB — INR BLD: 3.7

## 2022-05-19 PROCEDURE — 85610 PROTHROMBIN TIME: CPT

## 2022-05-19 PROCEDURE — 99212 OFFICE O/P EST SF 10 MIN: CPT

## 2022-05-19 NOTE — PATIENT INSTRUCTIONS
Continue to monitor urine and stool. Continue to monitor for signs of bleeding. Return to clinic in 2 weeks.   Hold warfarin today then continue warfarin 5 mg tablet daily. Hold warfarin for 3 days prior to thyroid biopsy.

## 2022-05-19 NOTE — PROGRESS NOTES
[employer]  Medication Management  ANTICOAGULATION    Referring Provider: Gregory     GOAL INR: 2-3     TODAY'S INR: 3.7     WARFARIN Dosage: Hold warfarin today then continue warfarin 5 mg tablet daily. Hold warfarin for 3 days prior to thyroid biopsy. INR (no units)   Date Value   2022 2.5   02/15/2022 2   2022 2   2021 2   10/05/2021 2.3   2021 3.2   2021 2.6       Medication changes:  none    Notes:    Fingerstick INR drawn per clinic protocol. Patient states no visible blood in urine and no black tarry stool. Denies any missed doses of warfarin. No change in other maintenance medications or in diet. Will recheck INR in 2 weeks. Patient acknowledges working in consult agreement with pharmacist as referred by his/her physician. Hold warfarin for 3 days prior to thyroid biopsy. Biopsy is 22. Patient will follow up with Dr Ana Roldan.            For Pharmacy Admin Tracking Only     Intervention Detail: Adherence Monitorin and Dose Adjustment: 3, reason: Therapy Optimization   Total # of Interventions Recommended: 3   Total # of Interventions Accepted: 3   Time Spent (min):  Chad Butts, [facility], PharmD

## 2022-05-25 ENCOUNTER — HOSPITAL ENCOUNTER (OUTPATIENT)
Dept: ULTRASOUND IMAGING | Age: 72
Discharge: HOME | End: 2022-05-27
Payer: MEDICARE

## 2022-05-25 VITALS
DIASTOLIC BLOOD PRESSURE: 84 MMHG | SYSTOLIC BLOOD PRESSURE: 162 MMHG | RESPIRATION RATE: 16 BRPM | HEART RATE: 67 BPM | OXYGEN SATURATION: 94 %

## 2022-05-25 DIAGNOSIS — E04.2 MULTINODULAR GOITER: ICD-10-CM

## 2022-05-25 PROCEDURE — 2500000003 HC RX 250 WO HCPCS: Performed by: RADIOLOGY

## 2022-05-25 PROCEDURE — 88172 CYTP DX EVAL FNA 1ST EA SITE: CPT

## 2022-05-25 PROCEDURE — 88173 CYTOPATH EVAL FNA REPORT: CPT

## 2022-05-25 PROCEDURE — 60100 BIOPSY OF THYROID: CPT

## 2022-05-25 PROCEDURE — 88305 TISSUE EXAM BY PATHOLOGIST: CPT

## 2022-05-25 RX ORDER — LIDOCAINE HYDROCHLORIDE 10 MG/ML
INJECTION, SOLUTION EPIDURAL; INFILTRATION; INTRACAUDAL; PERINEURAL
Status: COMPLETED | OUTPATIENT
Start: 2022-05-25 | End: 2022-05-25

## 2022-05-25 RX ADMIN — LIDOCAINE HYDROCHLORIDE 1 ML: 10 INJECTION, SOLUTION EPIDURAL; INFILTRATION; INTRACAUDAL; PERINEURAL at 15:14

## 2022-05-25 NOTE — SEDATION DOCUMENTATION
Discharge instructions reviewed with patient, voices understanding. Ambulates off department unaided. All belongings sent with patient.

## 2022-05-27 LAB — SURGICAL PATHOLOGY REPORT: NORMAL

## 2022-06-03 ENCOUNTER — HOSPITAL ENCOUNTER (OUTPATIENT)
Dept: PHARMACY | Age: 72
Setting detail: THERAPIES SERIES
Discharge: HOME | End: 2022-06-03
Payer: MEDICARE

## 2022-06-03 VITALS
HEART RATE: 63 BPM | DIASTOLIC BLOOD PRESSURE: 79 MMHG | SYSTOLIC BLOOD PRESSURE: 148 MMHG | WEIGHT: 270 LBS | BODY MASS INDEX: 43.58 KG/M2

## 2022-06-03 DIAGNOSIS — Z79.01 LONG TERM CURRENT USE OF ANTICOAGULANT THERAPY: Primary | ICD-10-CM

## 2022-06-03 LAB — INR BLD: 1.3

## 2022-06-03 PROCEDURE — 99212 OFFICE O/P EST SF 10 MIN: CPT

## 2022-06-03 PROCEDURE — 85610 PROTHROMBIN TIME: CPT

## 2022-06-03 NOTE — PROGRESS NOTES
Skip [address] [place] [address]/[place]  Medication Management  ANTICOAGULATION    Referring Provider: Gregory     GOAL INR: 2-3     TODAY'S INR: 1.3     WARFARIN Dosage: Take warfarin 2 tablets for 10 mg today then continue warfarin 5 mg tablet daily.       INR (no units)   Date Value   2022 3.7   2022 2.5   02/15/2022 2   2022 2   2021 2   10/05/2021 2.3   2021 3.2     Medication changes:  none    Notes:    Fingerstick INR drawn per clinic protocol. Patient states no visible blood in urine and no black tarry stool. Denies any missed doses of warfarin. No change in other maintenance medications or in diet. Will recheck INR in 2 weeks. Patient acknowledges working in consult agreement with pharmacist as referred by his/her physician. Biopsy was 22. Patient held 3 days prior to biopsy. Patient will follow up with Dr Angel Jensen on . Biopsy is benign.               For Pharmacy Admin Tracking Only     Intervention Detail: Adherence Monitorin and Dose Adjustment: 1, reason: Therapy Optimization   Total # of Interventions Recommended: 1   Total # of Interventions Accepted: 1   Time Spent (min):  Chad Butts [facility], PharmD

## 2022-06-03 NOTE — PATIENT INSTRUCTIONS
Continue to monitor urine and stool. Continue to monitor for signs of bleeding. Return to clinic in 2 weeks. Take warfarin 2 tablets for 10 mg today then continue warfarin 5 mg tablet daily.

## 2022-06-20 ENCOUNTER — OFFICE VISIT (OUTPATIENT)
Dept: ENT CLINIC | Age: 72
End: 2022-06-20
Payer: MEDICARE

## 2022-06-20 VITALS
WEIGHT: 270 LBS | SYSTOLIC BLOOD PRESSURE: 128 MMHG | OXYGEN SATURATION: 98 % | RESPIRATION RATE: 18 BRPM | DIASTOLIC BLOOD PRESSURE: 82 MMHG | BODY MASS INDEX: 43.58 KG/M2

## 2022-06-20 DIAGNOSIS — Z79.01 LONG TERM CURRENT USE OF ANTICOAGULANT THERAPY: ICD-10-CM

## 2022-06-20 DIAGNOSIS — E04.2 MULTINODULAR GOITER: Primary | ICD-10-CM

## 2022-06-20 PROCEDURE — 1123F ACP DISCUSS/DSCN MKR DOCD: CPT | Performed by: OTOLARYNGOLOGY

## 2022-06-20 PROCEDURE — 1090F PRES/ABSN URINE INCON ASSESS: CPT | Performed by: OTOLARYNGOLOGY

## 2022-06-20 PROCEDURE — G8417 CALC BMI ABV UP PARAM F/U: HCPCS | Performed by: OTOLARYNGOLOGY

## 2022-06-20 PROCEDURE — 1036F TOBACCO NON-USER: CPT | Performed by: OTOLARYNGOLOGY

## 2022-06-20 PROCEDURE — G8427 DOCREV CUR MEDS BY ELIG CLIN: HCPCS | Performed by: OTOLARYNGOLOGY

## 2022-06-20 PROCEDURE — 99213 OFFICE O/P EST LOW 20 MIN: CPT | Performed by: OTOLARYNGOLOGY

## 2022-06-20 PROCEDURE — G8399 PT W/DXA RESULTS DOCUMENT: HCPCS | Performed by: OTOLARYNGOLOGY

## 2022-06-20 PROCEDURE — 3017F COLORECTAL CA SCREEN DOC REV: CPT | Performed by: OTOLARYNGOLOGY

## 2022-06-20 ASSESSMENT — ENCOUNTER SYMPTOMS
RESPIRATORY NEGATIVE: 1
ALLERGIC/IMMUNOLOGIC NEGATIVE: 1
EYES NEGATIVE: 1
GASTROINTESTINAL NEGATIVE: 1

## 2022-06-20 NOTE — PROGRESS NOTES
[facility], [facility]  [address] [facility] [address]  Dept: [phone]  Amada Osler, MD Lanetta Cabot 70 y.o. female     Patient presents with a chief complaint of Thyroid Problem and Results (patient in the office to review her thyroid US)       /82   Resp 18   Wt 270 lb (122.5 kg)   LMP  (LMP Unknown)   SpO2 98%   BMI 43.58 kg/m²       History of Presenting Illness: The patient/caregiver reports a history of complaint with the following features: Onset: noted on recent exam  Timing: first noted at wellness exam  Duration: unknown  Quality: no hoarseness, no dysphagia, no palpable nodules  Location: low anterior neck  Severity: pain none  Risk factors: family of thyroid disease, grandmother with thyroid cancer  Alleviating factors: nothing gives relief  Aggravating factors: nothing makes it worse  Associated factors: no heat or cold intolerance    Review of systems covering 10 systems is reviewed as staff entry in other note and pertinent positives and negatives noted. Past Medical History:   Diagnosis Date    Abnormal cardiovascular stress test 07/05/2012    a small but moderate intensity anteroapical defect was seen suggestive of reversible ischemia. Normal EF. 2 treadmill score of 3. Mild to moderate risk study.  Abnormal resting ECG findings 06/15/2012    normal sinus rhythm at 69 beats per minute with ST and T wave abnormalities in the anterior leads consider anterior ischemia.  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     Depression     Diabetes mellitus ([address]     Hyperlipidemia     Hypertension     Microalbuminuria     Phlebitis and thrombophlebitis of femoral vein (deep) (superficial) ([address]  Sleep apnea     non compliant with cpap    Supraventricular premature beats        Current Outpatient Medications:     JANTOVEN 5 MG tablet, TAKE 1 TABLET DAILY        (COUMADIN CLINIC), Disp: 90 tablet, Rfl: 1    atorvastatin (LIPITOR) 40 MG tablet, TAKE 1 TABLET ONCE DAILY, Disp: 90 tablet, Rfl: 1    glimepiride (AMARYL) 1 MG tablet, TAKE 1 TABLET EVERY MORNINGBEFORE BREAKFAST, Disp: 90 tablet, Rfl: 1    metFORMIN (GLUCOPHAGE) 1000 MG tablet, Take 1 tablet by mouth 2 times daily (with meals), Disp: 180 tablet, Rfl: 1    metoprolol (LOPRESSOR) 100 MG tablet, TAKE 1 TABLET TWICE A DAY, Disp: 180 tablet, Rfl: 1    omeprazole (PRILOSEC) 20 MG delayed release capsule, TAKE 1 CAPSULE ONCE DAILY, Disp: 90 capsule, Rfl: 1    sertraline (ZOLOFT) 50 MG tablet, TAKE 1 TABLET ONCE DAILY, Disp: 90 tablet, Rfl: 1    triamterene-hydroCHLOROthiazide (DYAZIDE) 37.5-25 MG per capsule, TAKE 1 CAPSULE EVERY       MORNING, Disp: 90 capsule, Rfl: 1    losartan (COZAAR) 100 MG tablet, TAKE 1 TABLET ONCE DAILY, Disp: 90 tablet, Rfl: 1    Handicap Placard MISC, by Does not apply route EXPIRATION DATE: 3 YEARS, Disp: 1 each, Rfl: 0    Omega-3 Fatty Acids (FISH OIL) 1000 MG CPDR, Take 1,000 mg by mouth daily , Disp: , Rfl:     aspirin 325 MG EC tablet, Take 325 mg by mouth daily, Disp: , Rfl:     MICROLET LANCETS MISC, 1 each by Does not apply route daily Dx--E11.9 Non-Insulin dep, Disp: 100 each, Rfl: 3    blood glucose monitor strips, Use 1 QD  Dx-E11.9 Non-Insulin dep, Disp: 100 strip, Rfl: 3    Multiple Vitamin (MULTIVITAMIN PO), Take 1 tablet by mouth daily. , Disp: , Rfl:     calcium-vitamin D (OSCAL-500) 500-200 MG-UNIT per tablet, Take 1 tablet by mouth daily. , Disp: , Rfl:    No Known Allergies   Past Surgical History:   Procedure Laterality Date    APPENDECTOMY  8/91    CARDIAC CATHETERIZATION  8/9/12    LMCA:Mild irregularities. LAD:Mild irregularities. LCx:Mild irregularities.   RCA:Mild irregularities  CHOLECYSTECTOMY, LAPAROSCOPIC  2003    COLONOSCOPY  2014    FOOT SURGERY  2008, 2012    Lesion removal from left foot    HYSTERECTOMY (CERVIX STATUS UNKNOWN)      US THYROID BIOPSY  2022    US THYROID BIOPSY 2022 [facility]TR[facility]    VENA CAVA FILTER PLACEMENT  2012    Venous embolism & thrombosis of deep lower extremity      Social History     Socioeconomic History    Marital status: Single     Spouse name: Not on file    Number of children: Not on file    Years of education: Not on file    Highest education level: Not on file   Occupational History    Not on file   Tobacco Use    Smoking status: Former Smoker     Packs/day: 3.00     Years: 10.00     Pack years: 30.00     Types: Cigarettes     Quit date: 1980     Years since quittin.9    Smokeless tobacco: Never Used   Substance and Sexual Activity    Alcohol use: No    Drug use: Not on file    Sexual activity: Not on file   Other Topics Concern    Not on file   Social History Narrative    Not on file     Social Determinants of Health     Financial Resource Strain: Low Risk     Difficulty of Paying Living Expenses: Not hard at all   Food Insecurity: No Food Insecurity    Worried About Running Out of Food in the Last Year: Never true    Emmy of Food in the Last Year: Never true   Transportation Needs: No Transportation Needs    Lack of Transportation (Medical): No    Lack of Transportation (Non-Medical): No   Physical Activity: Insufficiently Active    Days of Exercise per Week: 1 day    Minutes of Exercise per Session: 20 min   Stress: No Stress Concern Present    Feeling of Stress : Only a little   Social Connections: Moderately Isolated    Frequency of Communication with Friends and Family: More than three times a week    Frequency of Social Gatherings with Friends and Family: More than three times a week    Attends Episcopal Services: Never    Active Member of Clubs or Organizations:  Yes    Attends Club or Organization Meetings: Never    Marital Status: Never    Intimate Partner Violence: Not At Risk    Fear of Current or Ex-Partner: No    Emotionally Abused: No    Physically Abused: No    Sexually Abused: No   Housing Stability:     Unable to Pay for Housing in the Last Year: Not on file    Number of [place] in the Last Year: Not on file    Unstable Housing in the Last Year: Not on file     Family History   Problem Relation Age of Onset    Glaucoma Mother     Other Mother         epilepsy    Hearing Loss Mother     Thyroid Disease Mother     Heart Attack Father     High Blood Pressure Father     High Cholesterol Father     Stroke Father         PHYSICAL EXAM:    The patient was examined today 6/20/2022 with findings as follows:    CONSTITUTIONAL:    General Appearance: well-appearing, nontoxic, alert, no acute distress     Communication: understanding at normal conversational tones, normal voicing, speech intelligible    HEAD/FACE:    Head: atraumatic, normocephalic, no lesions    Facial Inspection: no lesions, healthy skin    Facial Strength: motor strength normal, symmetric strength, symmetric movement, motor strength    Sinuses: no sinus tenderness    Salivary Glands: no enlargements of parotid glands, no tenderness of parotid glands, no masses of parotid glands, clear salivary flow on palpation from Stensen's ducts, no duct stones of Stensen's duct, no enlargement of submandibular glands, no tenderness of submandibular glands, no masses of submandibular glands, clear salivary flow from Weston's ducts, no stones of Weston's ducts    Temporomandibular Joint: no crepitus with motion, no tenderness on palpation, no trismus, motion symmetric    EYES:    Pupils: PERRLA, extra-ocular movements intact, no nystagmus, sclera white, no redness of eyes, no watering of eyes    EARS:    Bilateral External Ears: no pits, no tags    Right External Ear: normally formed, no lesions, no mastoid tenderness    Left External Ear: normally formed, no lesions, no mastoid tenderness    Right External Auditory Canal: normal, healthy skin, no obstructing cerumen, no discharge    Left External Auditory Canal: normal, healthy skin, no obstructing cerumen, no discharge    Right Tympanic Membrane: normal landmarks, translucent, mobile to pneumatic otoscopy, no perforation    Left Tympanic Membrane: normal landmarks, translucent, mobile to pneumatic otoscopy, no perforation    Hearing: intact to spoken voice, intact to finger rub    NOSE:    Nasal Skin: no lesions, no lacerations, no scars    Nasal Dorsum: symmetric with no visible or palpable deformities    Nasal Tip: normal symmetric nasal tip, normal nasal valves    Nasal Mucosa: normal, pink and moist    Septum: not markedly deformed, midline, no exposed vessels, no bleeding, no septal granuloma    Turbinates: normal size and conformation    Nasopharynx: normal    ORAL CAVITY/MOUTH:    Lips, teeth, gums: normal lips, normal gums, dentition intact, no dental pain on palpation    Oral Mucosa: normal, moist, no lesions    Palate: normal hard palate, normal soft palate, symmetric palatal elevation    Floor of Mouth: normal floor of mouth    Tongue: normal tongue, no lesions, no edema, no masses, normal mucosa, mobile    Tonsils: normal tonsils, symmetric, no lesions    Posterior pharynx: normal    NECK:    Neck: no masses, trachea midline, normal range of motion, no cysts or pits, no tenderness to palpation    Thyroid: diffuse enlargement, no tenderness, bilateral nodules    LYMPH NODES:    Cervical: no palpable lymph node enlargement    RESPIRATORY:    Inspection/Auscultation: good air movement, chest expands symmetrically, normal breath sounds, no wheezing, no stridor    CARDIOVASCULAR SYSTEM:    Auscultation: regular rate and rhythm, carotid pulse normal, no carotid thrills, no carotid bruits    Observation/Palpation of Peripheral Vascular System: no varicosities, no cyanosis, no edema    SKIN:    General Appearance: no lesions, warm and dry, normal turgor, no bruising    NEUROLOGICAL SYSTEM:    Orientation: oriented to time, oriented to place, oriented to person    Cranial Nerves: Cranial Nerves II-XII intact, normal facial movement    PSYCHIATRIC:    Mood and affect: normal mood, normal affect          Assessment and Plan:    She has several thyroid nodules with the right inferior nodule identified as in need of biopsy per her recent ultrasound. Given the location, ultrasound guided biopsy wass advised. This revealed benign follicular type nodule. Follow-up ultrasound for any growth given the large nodule size is suggested as the risk of occult malignancy increases with increased nodule size. The patient and/or caregiver is advised on the management of the thyroid nodule. The rationale for lab testing and imaging with ultrasound or other means is discussed. We have discussed that nodules less than 1 cm are generally managed with observation. In nodules greater that 1 cm, but less than 4 cm, fine needle aspiration biopsy or serial ultrasound is warranted. Lesions over four centimeters should be biopsied or removed due to the increased risk of malignancy. We have discussed that rapid growth, associated voice changes, male gender, age less than [address] years and over 36years of age, history of radiation exposure, and family history may be related to increased risk of malignancy. We have discussed that nodules suspicious for or with proven malignancy, or those causing pain, breathing or swallowing difficulties, or secreting excess thyroid hormone may benefit from surgical treatment. The patient and/or caregiver is able to state an understanding of these recommendations and is agreeable to the treatment plan. Diagnosis Orders   1. Multinodular goiter  US THYROID   2.  Long term current use of anticoagulant therapy        Return in about 1 year (around 6/20/2023). The patient and/or caregiver is to notify the office if no improvement or worsening of symptoms is noted prior to the scheduled follow-up for sooner evaluation. The patient and/or caregiver is able to state an understanding of these recommendations and is agreeable to the treatment plan. --Amado Vizcarra MD on 6/20/2022 at 9:38 AM    An electronic signature was used to authenticate this note.

## 2022-06-20 NOTE — PATIENT INSTRUCTIONS
SURVEY:    You may be receiving a survey from [employer] regarding your visit today. Please complete the survey to enable us to provide the highest quality of care to you and your family. If you cannot score us a very good on any question, please call the office to discuss how we could have made your experience a very good one. Thank you.   [id number], MD Lenita Cogan, MD Sparkle Caba, MD Antolin Calderon, MD Mirella Mcdermott, [facility]  Gayatri [place], [facility], [address] [place]  Anthony Garcia, SANDRA Fiore, [address] [place]  Lloyd Lopez, [address] [place]  Brendon Soto, [address] [place]

## 2022-06-24 ENCOUNTER — HOSPITAL ENCOUNTER (OUTPATIENT)
Dept: PHARMACY | Age: 72
Setting detail: THERAPIES SERIES
Discharge: HOME | End: 2022-06-24
Payer: MEDICARE

## 2022-06-24 VITALS
DIASTOLIC BLOOD PRESSURE: 88 MMHG | BODY MASS INDEX: 43.58 KG/M2 | HEART RATE: 64 BPM | SYSTOLIC BLOOD PRESSURE: 189 MMHG | WEIGHT: 270 LBS

## 2022-06-24 DIAGNOSIS — Z79.01 LONG TERM CURRENT USE OF ANTICOAGULANT THERAPY: Primary | ICD-10-CM

## 2022-06-24 LAB — INR BLD: 1.8

## 2022-06-24 PROCEDURE — 99212 OFFICE O/P EST SF 10 MIN: CPT

## 2022-06-24 PROCEDURE — 85610 PROTHROMBIN TIME: CPT

## 2022-06-24 NOTE — PATIENT INSTRUCTIONS
Continue to monitor urine and stool. Continue to monitor for signs of bleeding. Return to clinic in 4 weeks. Take warfarin 2 tablets for 10 mg today then continue warfarin 5 mg tablet daily.

## 2022-06-24 NOTE — PROGRESS NOTES
Skip [address] [place] [address]/[place]  Medication Management  ANTICOAGULATION    Referring Provider: Gregory     GOAL INR: 2-3     TODAY'S INR: 1.8     WARFARIN Dosage: Take warfarin 2 tablets for 10 mg today then continue warfarin 5 mg tablet daily.      INR (no units)   Date Value   2022 1.8   2022 1.3   2022 3.7   2022 2.5   02/15/2022 2   2022 2   2021 2     Medication changes:  None    Notes:    Fingerstick INR drawn per clinic protocol. Patient states no visible blood in urine and no black tarry stool. Denies any missed doses of warfarin. No change in other maintenance medications or in diet. Will recheck INR in 4 weeks. Patient acknowledges working in consult agreement with pharmacist as referred by his/her physician.                   For Pharmacy Admin Tracking Only     Intervention Detail: Adherence Monitorin and Dose Adjustment: 1, reason: Therapy Optimization   Total # of Interventions Recommended: 1   Total # of Interventions Accepted: 1   Time Spent (min): [facility], [address] [place] [address], PharmD

## 2022-07-22 ENCOUNTER — HOSPITAL ENCOUNTER (OUTPATIENT)
Dept: PHARMACY | Age: 72
Setting detail: THERAPIES SERIES
Discharge: HOME | End: 2022-07-22
Payer: MEDICARE

## 2022-07-22 VITALS
HEART RATE: 60 BPM | WEIGHT: 273 LBS | BODY MASS INDEX: 44.06 KG/M2 | DIASTOLIC BLOOD PRESSURE: 85 MMHG | SYSTOLIC BLOOD PRESSURE: 180 MMHG

## 2022-07-22 DIAGNOSIS — Z79.01 LONG TERM CURRENT USE OF ANTICOAGULANT THERAPY: Primary | ICD-10-CM

## 2022-07-22 LAB — INR BLD: 1.7

## 2022-07-22 PROCEDURE — 85610 PROTHROMBIN TIME: CPT

## 2022-07-22 PROCEDURE — 99212 OFFICE O/P EST SF 10 MIN: CPT

## 2022-07-22 NOTE — PROGRESS NOTES
Skip [address] [facility]/[place]  Medication Management  ANTICOAGULATION    Referring Provider: Dr. Vivar Heart INR: 2-3    TODAY'S INR: 1.7    WARFARIN Dosage: Increase warfarin to 7.5 mg po every Friday; 5 mg po all other days    INR (no units)   Date Value   2022 1.7   2022 1.8   2022 1.3   2022 3.7   2022 2.5   02/15/2022 2   2022 2       Medication changes:  No changes  Notes:    Fingerstick INR drawn per clinic protocol. Patient states no visible blood in urine and no black tarry stool. Denies any missed doses of warfarin. No change in other maintenance medications or in diet. Will recheck INR in 3 weeks. Patient acknowledges working in consult agreement with pharmacist as referred by his/her physician. For Pharmacy Admin Tracking Only    Intervention Detail: Adherence Monitorin and Dose Adjustment: 1, reason: Therapy Optimization  Total # of Interventions Recommended: 2  Total # of Interventions Accepted: 2  Time Spent (min): KATT [address].  Anusha Graf, [facility]

## 2022-07-22 NOTE — PATIENT INSTRUCTIONS
Increase current dose of warfarin as instructed on dosing calendar provided - 7.5 mg every Friday and 5 mg all other days. Return to clinic in 3 weeks. Continue to monitor urine and stool for signs and symptoms of bleeding. Please notify the clinic of any medication changes.

## 2022-08-16 ENCOUNTER — HOSPITAL ENCOUNTER (OUTPATIENT)
Dept: PHARMACY | Age: 72
Setting detail: THERAPIES SERIES
Discharge: HOME | End: 2022-08-16
Payer: MEDICARE

## 2022-08-16 VITALS
HEART RATE: 61 BPM | WEIGHT: 267 LBS | SYSTOLIC BLOOD PRESSURE: 168 MMHG | DIASTOLIC BLOOD PRESSURE: 78 MMHG | BODY MASS INDEX: 43.09 KG/M2

## 2022-08-16 DIAGNOSIS — Z79.01 LONG TERM CURRENT USE OF ANTICOAGULANT THERAPY: Primary | ICD-10-CM

## 2022-08-16 LAB — INR BLD: 1.7

## 2022-08-16 PROCEDURE — 99211 OFF/OP EST MAY X REQ PHY/QHP: CPT

## 2022-08-16 PROCEDURE — 85610 PROTHROMBIN TIME: CPT

## 2022-08-16 NOTE — PROGRESS NOTES
Skip [address] [facility] [place]  Medication Management  ANTICOAGULATION    Referring Provider: Dr. Jax Sheriff INR: 2-3    TODAY'S INR: 1.7    WARFARIN Dosage: Patient will take warfarin 7.5 mg po today, then continue 7.5 mg po every Friday; 5 mg po all other days    INR (no units)   Date Value   2022 1.7   2022 1.7   2022 1.8   2022 1.3   2022 3.7   2022 2.5   02/15/2022 2       Medication changes:  No changes  Notes:    Fingerstick INR drawn per clinic protocol. Patient states no visible blood in urine and no black tarry stool. Denies any missed doses of warfarin. No change in other maintenance medications or in diet. Will recheck INR in 4 weeks. Patient acknowledges working in consult agreement with pharmacist as referred by his/her physician. Patient reports that she had sauerkraut and pork (1 can over 3 days). For Pharmacy Admin Tracking Only    Intervention Detail: Adherence Monitorin and Dose Adjustment: 1, reason: Improve Adherence, Therapy Optimization  Total # of Interventions Recommended: 2  Total # of Interventions Accepted: 2  Time Spent (min): PERRY/ [address]  Sofi Green, [address] [facility]

## 2022-09-13 ENCOUNTER — HOSPITAL ENCOUNTER (OUTPATIENT)
Dept: PHARMACY | Age: 72
Setting detail: THERAPIES SERIES
Discharge: HOME | End: 2022-09-13
Payer: MEDICARE

## 2022-09-13 VITALS — BODY MASS INDEX: 43.58 KG/M2 | WEIGHT: 270 LBS

## 2022-09-13 DIAGNOSIS — Z79.01 LONG TERM CURRENT USE OF ANTICOAGULANT THERAPY: Primary | ICD-10-CM

## 2022-09-13 LAB — INR BLD: 1.9

## 2022-09-13 PROCEDURE — 99212 OFFICE O/P EST SF 10 MIN: CPT

## 2022-09-13 PROCEDURE — 85610 PROTHROMBIN TIME: CPT

## 2022-09-13 NOTE — PROGRESS NOTES
Skip [address] [place] [address]/[place]  Medication Management  ANTICOAGULATION    Referring Provider: Dr. Dedrick Rodriguez INR: 2-3    TODAY'S INR: 1.9    WARFARIN Dosage: Increase warfarin to 7.5 mg po every Monday and Friday; 5 mg po all other days  Patient will take warfarin 7.5 mg po today for 1 dose    INR (no units)   Date Value   2022 1.9   2022 1.7   2022 1.7   2022 1.8   2022 1.3   2022 3.7   2022 2.5       Medication changes:  No changes  Notes:    Fingerstick INR drawn per clinic protocol. Patient states no visible blood in urine and no black tarry stool. Denies any missed doses of warfarin. No change in other maintenance medications or in diet. Will recheck INR in 3 weeks. Patient acknowledges working in consult agreement with pharmacist as referred by his/her physician. For Pharmacy Admin Tracking Only    Intervention Detail: Adherence Monitorin and Dose Adjustment: 1, reason: Improve Adherence, Therapy Optimization  Total # of Interventions Recommended: 2  Total # of Interventions Accepted: 2  Time Spent (min): PERRY/ [address]  Marcus Galvez, [facility]

## 2022-09-13 NOTE — PATIENT INSTRUCTIONS
Please take warfarin 7.5 mg today. Increase current dose of warfarin as instructed on dosing calendar provided. Return to clinic in 3 weeks. Continue to monitor urine and stool for signs and symptoms of bleeding. Please notify the clinic of any medication changes. Please remember to bring all medications (both prescription and OTC) to your next visit. Kindly notify the clinic if you are unable to make to your next appointment.

## 2022-10-04 ENCOUNTER — HOSPITAL ENCOUNTER (OUTPATIENT)
Age: 72
Discharge: HOME | End: 2022-10-04
Payer: MEDICARE

## 2022-10-04 ENCOUNTER — HOSPITAL ENCOUNTER (OUTPATIENT)
Dept: PHARMACY | Age: 72
Setting detail: THERAPIES SERIES
Discharge: HOME | End: 2022-10-04
Payer: MEDICARE

## 2022-10-04 VITALS
BODY MASS INDEX: 43.42 KG/M2 | DIASTOLIC BLOOD PRESSURE: 83 MMHG | HEART RATE: 57 BPM | SYSTOLIC BLOOD PRESSURE: 184 MMHG | WEIGHT: 269 LBS

## 2022-10-04 DIAGNOSIS — I10 ESSENTIAL HYPERTENSION: ICD-10-CM

## 2022-10-04 DIAGNOSIS — Z79.01 LONG TERM CURRENT USE OF ANTICOAGULANT THERAPY: Primary | ICD-10-CM

## 2022-10-04 DIAGNOSIS — E11.9 TYPE 2 DIABETES MELLITUS WITHOUT COMPLICATION, WITHOUT LONG-TERM CURRENT USE OF INSULIN (HCC): ICD-10-CM

## 2022-10-04 LAB
ALBUMIN SERPL-MCNC: 4.6 G/DL (ref 3.5–5.2)
ALBUMIN/GLOBULIN RATIO: 1.3 (ref 1–2.5)
ALP BLD-CCNC: 56 U/L (ref 35–104)
ALT SERPL-CCNC: 44 U/L (ref 5–33)
ANION GAP SERPL CALCULATED.3IONS-SCNC: 15 MMOL/L (ref 9–17)
AST SERPL-CCNC: 44 U/L
BILIRUB SERPL-MCNC: 0.7 MG/DL (ref 0.3–1.2)
BUN BLDV-MCNC: 19 MG/DL (ref 8–23)
BUN/CREAT BLD: 19 (ref 9–20)
CALCIUM SERPL-MCNC: 10.8 MG/DL (ref 8.6–10.4)
CHLORIDE BLD-SCNC: 96 MMOL/L (ref 98–107)
CHOLESTEROL/HDL RATIO: 2.9
CHOLESTEROL: 132 MG/DL
CO2: 26 MMOL/L (ref 20–31)
CREAT SERPL-MCNC: 1 MG/DL (ref 0.5–0.9)
GFR SERPL CREATININE-BSD FRML MDRD: >60 ML/MIN/1.73M2
GLUCOSE BLD-MCNC: 168 MG/DL (ref 70–99)
HCT VFR BLD CALC: 36.9 % (ref 36.3–47.1)
HDLC SERPL-MCNC: 46 MG/DL
HEMOGLOBIN: 12.1 G/DL (ref 11.9–15.1)
INR BLD: 2.1
LDL CHOLESTEROL: 37 MG/DL (ref 0–130)
MCH RBC QN AUTO: 31.3 PG (ref 25.2–33.5)
MCHC RBC AUTO-ENTMCNC: 32.8 G/DL (ref 28.4–34.8)
MCV RBC AUTO: 95.3 FL (ref 82.6–102.9)
NRBC AUTOMATED: 0 PER 100 WBC
PDW BLD-RTO: 15.7 % (ref 11.8–14.4)
PLATELET # BLD: 259 K/UL (ref 138–453)
PMV BLD AUTO: 10.5 FL (ref 8.1–13.5)
POTASSIUM SERPL-SCNC: 4.1 MMOL/L (ref 3.7–5.3)
RBC # BLD: 3.87 M/UL (ref 3.95–5.11)
SODIUM BLD-SCNC: 137 MMOL/L (ref 135–144)
TOTAL PROTEIN: 8.2 G/DL (ref 6.4–8.3)
TRIGL SERPL-MCNC: 244 MG/DL
WBC # BLD: 7.5 K/UL (ref 3.5–11.3)

## 2022-10-04 PROCEDURE — 85610 PROTHROMBIN TIME: CPT

## 2022-10-04 PROCEDURE — 99211 OFF/OP EST MAY X REQ PHY/QHP: CPT

## 2022-10-04 PROCEDURE — 36415 COLL VENOUS BLD VENIPUNCTURE: CPT

## 2022-10-04 PROCEDURE — 83036 HEMOGLOBIN GLYCOSYLATED A1C: CPT

## 2022-10-04 PROCEDURE — 80053 COMPREHEN METABOLIC PANEL: CPT

## 2022-10-04 PROCEDURE — 80061 LIPID PANEL: CPT

## 2022-10-04 PROCEDURE — 85027 COMPLETE CBC AUTOMATED: CPT

## 2022-10-04 NOTE — PROGRESS NOTES
Skip [address] [place] [address]/[place]  Medication Management  ANTICOAGULATION    Referring Provider: Dr. Laquita Oconnor INR: 2-3    TODAY'S INR: 2.1    WARFARIN Dosage: Continue warfarin 7.5 mg po every Monday and Friday; 5 mg po all other days    INR (no units)   Date Value   10/04/2022 2.1   2022 1.9   2022 1.7   2022 1.7   2022 1.8   2022 1.3   2022 3.7       Medication changes:  No changes  Notes:    Fingerstick INR drawn per clinic protocol. Patient states no visible blood in urine and no black tarry stool. Denies any missed doses of warfarin. No change in other maintenance medications or in diet. Will recheck INR in 6 weeks. Patient acknowledges working in consult agreement with pharmacist as referred by his/her physician. BP elevated at this visit. Patient has appt with PCP on 10/10/22 and plans to address at that visit. For Pharmacy Admin Tracking Only    Intervention Detail: Adherence Monitorin  Total # of Interventions Recommended: 1  Total # of Interventions Accepted: 1  Time Spent (min): PERRY/ [address]  Maryann Shultz, [facility]
Authored by Resident/PA/NP

## 2022-10-06 LAB
ESTIMATED AVERAGE GLUCOSE: 163 MG/DL
HBA1C MFR BLD: 7.3 % (ref 4–6)

## 2022-10-06 NOTE — RESULT ENCOUNTER NOTE
Please call pt and inform them their labwork results are abnormal.  Increased A1c and also increased calcium level. Stop calcium supplement and repeat cmp in 3 months.

## 2022-11-15 ENCOUNTER — HOSPITAL ENCOUNTER (OUTPATIENT)
Dept: PHARMACY | Age: 72
Setting detail: THERAPIES SERIES
Discharge: HOME | End: 2022-11-15
Payer: MEDICARE

## 2022-11-15 DIAGNOSIS — I82.409 DEEP VEIN THROMBOSIS (DVT) OF LOWER EXTREMITY, UNSPECIFIED CHRONICITY, UNSPECIFIED LATERALITY, UNSPECIFIED VEIN (HCC): ICD-10-CM

## 2022-11-15 DIAGNOSIS — Z79.01 LONG TERM CURRENT USE OF ANTICOAGULANT THERAPY: Primary | ICD-10-CM

## 2022-11-15 DIAGNOSIS — I26.99 PULMONARY EMBOLISM, UNSPECIFIED CHRONICITY, UNSPECIFIED PULMONARY EMBOLISM TYPE, UNSPECIFIED WHETHER ACUTE COR PULMONALE PRESENT (HCC): ICD-10-CM

## 2022-11-15 LAB — INR BLD: 1.9

## 2022-11-15 PROCEDURE — 99211 OFF/OP EST MAY X REQ PHY/QHP: CPT | Performed by: FAMILY MEDICINE

## 2022-11-15 PROCEDURE — 85610 PROTHROMBIN TIME: CPT | Performed by: FAMILY MEDICINE

## 2022-11-15 NOTE — PATIENT INSTRUCTIONS
Please take 7.5mg (1 1/2 tablet) today then return to your regular dosing of 7.5mg (1 1/2 tablets) Monday and Friday and 5mg (1 tablet) all other days. Continue to monitor for signs of bleeding. Return to coumadin clinic in 5 weeks.

## 2022-11-15 NOTE — PROGRESS NOTES
Skip [address] [place] [address]/[place]  Medication Management  ANTICOAGULATION    Referring Provider: Dr Nena Donovan INR: 2.0-3.0    TODAY'S INR: 1.9    WARFARIN Dosage: 7.5mg x1 then resume 7.5mg MF, 5mg all other days    INR (no units)   Date Value   11/15/2022 1.9   10/04/2022 2.1   2022 1.9   2022 1.7   2022 1.7   2022 1.8   2022 1.3       Medication changes:  Discontinued sertraline (tapered over last 6 weeks)  Stopped calcium due to high calcium levels  Notes:    Fingerstick INR drawn per clinic protocol. Patient states no visible blood in urine and no black tarry stool. Denies any missed doses of warfarin. No change in other maintenance medications or in diet. Will recheck INR in 6 weeks. Umair Lea has decided to cancel foot surgery that was scheduled in early December. She does not have plans at this time for surgery. Patient acknowledges working in consult agreement with pharmacist as referred by his/her physician.                   For Pharmacy Admin Tracking Only    Intervention Detail: Adherence Monitorin and Dose Adjustment: 1, reason: Therapy Optimization  Total # of Interventions Recommended: 3  Total # of Interventions Accepted: 3  Time Spent (min): 20      Brayan Juarez R.Ph., 11/15/2022,12:23 PM

## 2022-11-28 ENCOUNTER — HOSPITAL ENCOUNTER (OUTPATIENT)
Dept: WOMENS IMAGING | Age: 72
Discharge: HOME | End: 2022-11-30
Payer: MEDICARE

## 2022-11-28 DIAGNOSIS — Z12.31 BREAST CANCER SCREENING BY MAMMOGRAM: ICD-10-CM

## 2022-11-28 PROCEDURE — 77067 SCR MAMMO BI INCL CAD: CPT

## 2022-11-28 NOTE — PROGRESS NOTES
Fingerstick INR drawn per clinic protocol. Patient states no visible blood in urine and no black tarry stool. Denies any missed doses of warfarin. No change in other maintenance medications or in diet. Will increase current warfarin regimen and recheck INR in 2 week(s). Patient's INR has remained slightly subtherapeutic today so will increase weekly dosage 7% to 5mg po daily. Patient acknowledges working in consult agreement with pharmacist as referred by his/her physician.
None

## 2022-12-20 ENCOUNTER — HOSPITAL ENCOUNTER (OUTPATIENT)
Dept: PHARMACY | Age: 72
Setting detail: THERAPIES SERIES
Discharge: HOME | End: 2022-12-20
Payer: MEDICARE

## 2022-12-20 VITALS
WEIGHT: 269 LBS | SYSTOLIC BLOOD PRESSURE: 165 MMHG | BODY MASS INDEX: 43.42 KG/M2 | HEART RATE: 58 BPM | DIASTOLIC BLOOD PRESSURE: 70 MMHG

## 2022-12-20 DIAGNOSIS — Z79.01 LONG TERM CURRENT USE OF ANTICOAGULANT THERAPY: Primary | ICD-10-CM

## 2022-12-20 LAB — INR BLD: 2

## 2022-12-20 PROCEDURE — 99212 OFFICE O/P EST SF 10 MIN: CPT

## 2022-12-20 PROCEDURE — 85610 PROTHROMBIN TIME: CPT

## 2022-12-20 NOTE — PROGRESS NOTES
Skip [address] [place] [address]/[place]  Medication Management  ANTICOAGULATION    Referring Provider: Dr. Xenia Guerrero INR: 2-3    TODAY'S INR: 2    WARFARIN Dosage: Increase warfarin to 7.5 mg po every MWF; 5 mg po all other days    INR (no units)   Date Value   2022 2   11/15/2022 1.9   10/04/2022 2.1   2022 1.9   2022 1.7   2022 1.7   2022 1.8       Medication changes:  No changes  Notes:    Fingerstick INR drawn per clinic protocol. Patient states no visible blood in urine and no black tarry stool. Denies any missed doses of warfarin. No change in other maintenance medications or in diet. Will recheck INR in 3 weeks. Patient acknowledges working in consult agreement with pharmacist as referred by his/her physician. Prescription called to [facility]) for warfarin 5 mg tablets - Take 1.5 tablets (=7.5 mg) po every MWF and 1 tablet (=5 mg) po all other days or as instructed per clinic   Qty:   109 tablets    Refill:  3   Prescriber:  Dr. Mary Beth Talbert Only    Intervention Detail: Adherence Monitorin, Dose Adjustment: 1, reason: Improve Adherence, Therapy Optimization, and Refill(s) Provided  Total # of Interventions Recommended: 2  Total # of Interventions Accepted: 2  Time Spent (min): [facility] 20.  [facility]

## 2022-12-20 NOTE — PATIENT INSTRUCTIONS
Increase current dose of warfarin as instructed on dosing calendar provided - 7.5 mg every MWF and 5 mg all other days. Return to clinic in 3 weeks. Continue to monitor urine and stool for signs and symptoms of bleeding. Please notify the clinic of any medication changes.

## 2023-01-10 ENCOUNTER — HOSPITAL ENCOUNTER (OUTPATIENT)
Dept: PHARMACY | Age: 73
Setting detail: THERAPIES SERIES
Discharge: HOME | End: 2023-01-10
Payer: MEDICARE

## 2023-01-10 VITALS
DIASTOLIC BLOOD PRESSURE: 70 MMHG | HEART RATE: 61 BPM | BODY MASS INDEX: 42.77 KG/M2 | WEIGHT: 265 LBS | SYSTOLIC BLOOD PRESSURE: 184 MMHG

## 2023-01-10 DIAGNOSIS — Z79.01 LONG TERM CURRENT USE OF ANTICOAGULANT THERAPY: Primary | ICD-10-CM

## 2023-01-10 LAB — INR BLD: 3

## 2023-01-10 PROCEDURE — 85610 PROTHROMBIN TIME: CPT

## 2023-01-10 PROCEDURE — 99212 OFFICE O/P EST SF 10 MIN: CPT

## 2023-01-10 NOTE — PATIENT INSTRUCTIONS
Decrease current dose of warfarin as instructed on dosing calendar provided - 7.5 mg every Monday and Friday and 5 mg all other days. Return to clinic in 3 weeks. Continue to monitor urine and stool for signs and symptoms of bleeding. Please notify the clinic of any medication changes.

## 2023-01-10 NOTE — PROGRESS NOTES
Skip [address] [place] [address]/[place]  Medication Management  ANTICOAGULATION    Referring Provider: Dr. Nimisha Ross INR: 2-3    TODAY'S INR: 3    WARFARIN Dosage: Decrease warfarin to 7.5 mg po every MF; 5 mg po all other days    INR (no units)   Date Value   01/10/2023 3   2022 2   11/15/2022 1.9   10/04/2022 2.1   2022 1.9   2022 1.7   2022 1.7       Medication changes:  No changes    Notes:    Fingerstick INR drawn per clinic protocol. Patient states no visible blood in urine and no black tarry stool. Denies any missed doses of warfarin. No change in other maintenance medications or in diet. Will recheck INR in 3 weeks. Patient acknowledges working in consult agreement with pharmacist as referred by his/her physician. For Pharmacy Admin Tracking Only    Intervention Detail: Adherence Monitorin and Dose Adjustment: 1, reason: Improve Adherence, Therapy Optimization  Total # of Interventions Recommended: 2  Total # of Interventions Accepted: 2  Time Spent (min): 618 [facility]

## 2023-01-31 ENCOUNTER — HOSPITAL ENCOUNTER (OUTPATIENT)
Dept: PHARMACY | Age: 73
Setting detail: THERAPIES SERIES
Discharge: HOME | End: 2023-01-31
Payer: MEDICARE

## 2023-01-31 VITALS — BODY MASS INDEX: 44 KG/M2 | WEIGHT: 272.6 LBS

## 2023-01-31 DIAGNOSIS — Z79.01 LONG TERM CURRENT USE OF ANTICOAGULANT THERAPY: Primary | ICD-10-CM

## 2023-01-31 DIAGNOSIS — I26.99 PULMONARY EMBOLISM, UNSPECIFIED CHRONICITY, UNSPECIFIED PULMONARY EMBOLISM TYPE, UNSPECIFIED WHETHER ACUTE COR PULMONALE PRESENT (HCC): ICD-10-CM

## 2023-01-31 DIAGNOSIS — I82.409 DEEP VEIN THROMBOSIS (DVT) OF LOWER EXTREMITY, UNSPECIFIED CHRONICITY, UNSPECIFIED LATERALITY, UNSPECIFIED VEIN (HCC): ICD-10-CM

## 2023-01-31 LAB — INR BLD: 2.1

## 2023-01-31 PROCEDURE — 99211 OFF/OP EST MAY X REQ PHY/QHP: CPT | Performed by: FAMILY MEDICINE

## 2023-01-31 PROCEDURE — 85610 PROTHROMBIN TIME: CPT | Performed by: FAMILY MEDICINE

## 2023-01-31 NOTE — PATIENT INSTRUCTIONS
Continue to take warfarin 7.5mg warfarin (1 1/2 tablets) on Monday and Friday and 5mg (1 tablte) all other days. Continue to monitor for signs of bleeding. Return to coumadin clinic in 4 weeks.

## 2023-02-28 ENCOUNTER — HOSPITAL ENCOUNTER (OUTPATIENT)
Dept: PHARMACY | Age: 73
Setting detail: THERAPIES SERIES
Discharge: HOME | End: 2023-02-28
Payer: MEDICARE

## 2023-02-28 VITALS
SYSTOLIC BLOOD PRESSURE: 194 MMHG | BODY MASS INDEX: 44.39 KG/M2 | HEART RATE: 61 BPM | WEIGHT: 275 LBS | DIASTOLIC BLOOD PRESSURE: 90 MMHG

## 2023-02-28 DIAGNOSIS — Z79.01 LONG TERM CURRENT USE OF ANTICOAGULANT THERAPY: Primary | ICD-10-CM

## 2023-02-28 LAB — INR BLD: 2.4

## 2023-02-28 PROCEDURE — 99211 OFF/OP EST MAY X REQ PHY/QHP: CPT

## 2023-02-28 PROCEDURE — 85610 PROTHROMBIN TIME: CPT

## 2023-02-28 NOTE — PROGRESS NOTES
[facility]  [facility]ication Management  ANTICOAGULATION    Referring Provider: Dr Jenkins    GOAL INR: 2.0-3.0    TODAY'S INR: 2.4    WARFARIN Dosage: continue 7.5mg MF, 5mg all other days    INR (no units)   Date Value   2023 2.4   2023 2.1   01/10/2023 3   2022 2   11/15/2022 1.9   10/04/2022 2.1   2022 1.9       Medication changes:  No changes  Notes:    Fingerstick INR drawn per clinic protocol. Patient states no visible blood in urine and no black tarry stool. Denies any missed doses of warfarin. No change in other maintenance medications or in diet. Will recheck INR in 4 weeks. Patient acknowledges working in consult agreement with pharmacist as referred by his/her physician.       BP elevated in clinic today.  Patient has BP monitor at home and she will begin checking at home and keeping log.              For Pharmacy Admin Tracking Only    Intervention Detail: Adherence Monitorin  Total # of Interventions Recommended: 1  Total # of Interventions Accepted: 1  Time Spent (min): 20    Sussy Herrmann [facility]

## 2023-03-26 ENCOUNTER — APPOINTMENT (OUTPATIENT)
Dept: GENERAL RADIOLOGY | Age: 73
DRG: 377 | End: 2023-03-26
Payer: MEDICARE

## 2023-03-26 ENCOUNTER — HOSPITAL ENCOUNTER (INPATIENT)
Age: 73
LOS: 3 days | Discharge: HOME | DRG: 377 | End: 2023-03-29
Attending: EMERGENCY MEDICINE | Admitting: INTERNAL MEDICINE
Payer: MEDICARE

## 2023-03-26 ENCOUNTER — APPOINTMENT (OUTPATIENT)
Dept: CT IMAGING | Age: 73
DRG: 377 | End: 2023-03-26
Payer: MEDICARE

## 2023-03-26 ENCOUNTER — APPOINTMENT (OUTPATIENT)
Dept: CT IMAGING | Age: 73
DRG: 379 | End: 2023-03-26
Payer: MEDICARE

## 2023-03-26 ENCOUNTER — HOSPITAL ENCOUNTER (EMERGENCY)
Age: 73
Discharge: ANOTHER HOSPITAL | DRG: 379 | End: 2023-03-26
Attending: EMERGENCY MEDICINE | Admitting: INTERNAL MEDICINE
Payer: MEDICARE

## 2023-03-26 VITALS
RESPIRATION RATE: 17 BRPM | DIASTOLIC BLOOD PRESSURE: 74 MMHG | TEMPERATURE: 96.5 F | SYSTOLIC BLOOD PRESSURE: 155 MMHG | OXYGEN SATURATION: 99 % | HEART RATE: 74 BPM

## 2023-03-26 DIAGNOSIS — K92.2 LOWER GI BLEED: Primary | ICD-10-CM

## 2023-03-26 DIAGNOSIS — K62.5 RECTAL BLEEDING: Primary | ICD-10-CM

## 2023-03-26 PROBLEM — E11.9 TYPE 2 DIABETES MELLITUS WITHOUT COMPLICATION, WITHOUT LONG-TERM CURRENT USE OF INSULIN (HCC): Chronic | Status: ACTIVE | Noted: 2022-04-05

## 2023-03-26 PROBLEM — D68.59 OTHER PRIMARY THROMBOPHILIA (HCC): Chronic | Status: ACTIVE | Noted: 2021-03-30

## 2023-03-26 LAB
ABSOLUTE EOS #: 0.33 K/UL (ref 0–0.44)
ABSOLUTE EOS #: <0.03 K/UL (ref 0–0.44)
ABSOLUTE IMMATURE GRANULOCYTE: 0.06 K/UL (ref 0–0.3)
ABSOLUTE IMMATURE GRANULOCYTE: <0.03 K/UL (ref 0–0.3)
ABSOLUTE LYMPH #: 1.88 K/UL (ref 1.1–3.7)
ABSOLUTE LYMPH #: 3.45 K/UL (ref 1.1–3.7)
ABSOLUTE MONO #: 0.4 K/UL (ref 0.1–1.2)
ABSOLUTE MONO #: 0.53 K/UL (ref 0.1–1.2)
ALBUMIN SERPL-MCNC: 3.5 G/DL (ref 3.5–5.2)
ALBUMIN/GLOBULIN RATIO: 1.3 (ref 1–2.5)
ALP SERPL-CCNC: 46 U/L (ref 35–104)
ALT SERPL-CCNC: 29 U/L (ref 5–33)
ANION GAP SERPL CALCULATED.3IONS-SCNC: 17 MMOL/L (ref 9–17)
ANION GAP SERPL CALCULATED.3IONS-SCNC: 17 MMOL/L (ref 9–17)
ANION GAP SERPL CALCULATED.3IONS-SCNC: 18 MMOL/L (ref 9–17)
AST SERPL-CCNC: 29 U/L
BASOPHILS # BLD: 0 % (ref 0–2)
BASOPHILS # BLD: 1 % (ref 0–2)
BASOPHILS ABSOLUTE: 0.04 K/UL (ref 0–0.2)
BASOPHILS ABSOLUTE: <0.03 K/UL (ref 0–0.2)
BILIRUB DIRECT SERPL-MCNC: 0.2 MG/DL
BILIRUB INDIRECT SERPL-MCNC: 0.4 MG/DL (ref 0–1)
BILIRUB SERPL-MCNC: 0.6 MG/DL (ref 0.3–1.2)
BILIRUBIN URINE: NEGATIVE
BUN SERPL-MCNC: 21 MG/DL (ref 8–23)
BUN SERPL-MCNC: 25 MG/DL (ref 8–23)
BUN SERPL-MCNC: 25 MG/DL (ref 8–23)
BUN/CREAT BLD: 20 (ref 9–20)
BUN/CREAT BLD: 21 (ref 9–20)
CA-I BLD-SCNC: 1.03 MMOL/L (ref 1.13–1.33)
CALCIUM SERPL-MCNC: 7.7 MG/DL (ref 8.6–10.4)
CALCIUM SERPL-MCNC: 9.1 MG/DL (ref 8.6–10.4)
CALCIUM SERPL-MCNC: 9.7 MG/DL (ref 8.6–10.4)
CHLORIDE SERPL-SCNC: 102 MMOL/L (ref 98–107)
CHLORIDE SERPL-SCNC: 106 MMOL/L (ref 98–107)
CHLORIDE SERPL-SCNC: 99 MMOL/L (ref 98–107)
CO2 SERPL-SCNC: 17 MMOL/L (ref 20–31)
CO2 SERPL-SCNC: 18 MMOL/L (ref 20–31)
CO2 SERPL-SCNC: 21 MMOL/L (ref 20–31)
COLOR: YELLOW
CREAT SERPL-MCNC: 0.94 MG/DL (ref 0.5–0.9)
CREAT SERPL-MCNC: 1.21 MG/DL (ref 0.5–0.9)
CREAT SERPL-MCNC: 1.24 MG/DL (ref 0.5–0.9)
CRP SERPL HS-MCNC: <3 MG/L (ref 0–5)
EKG ATRIAL RATE: 73 BPM
EKG P AXIS: 32 DEGREES
EKG P-R INTERVAL: 164 MS
EKG Q-T INTERVAL: 432 MS
EKG QRS DURATION: 100 MS
EKG QTC CALCULATION (BAZETT): 475 MS
EKG R AXIS: 4 DEGREES
EKG T AXIS: 30 DEGREES
EKG VENTRICULAR RATE: 73 BPM
EOSINOPHILS RELATIVE PERCENT: 0 % (ref 1–4)
EOSINOPHILS RELATIVE PERCENT: 4 % (ref 1–4)
ERYTHROCYTE [SEDIMENTATION RATE] IN BLOOD BY WESTERGREN METHOD: 12 MM/HR (ref 0–30)
GFR SERPL CREATININE-BSD FRML MDRD: 46 ML/MIN/1.73M2
GFR SERPL CREATININE-BSD FRML MDRD: 48 ML/MIN/1.73M2
GFR SERPL CREATININE-BSD FRML MDRD: >60 ML/MIN/1.73M2
GLUCOSE SERPL-MCNC: 173 MG/DL (ref 70–99)
GLUCOSE SERPL-MCNC: 212 MG/DL (ref 70–99)
GLUCOSE SERPL-MCNC: 217 MG/DL (ref 70–99)
GLUCOSE UR STRIP.AUTO-MCNC: NEGATIVE MG/DL
HCT VFR BLD AUTO: 23.5 % (ref 36.3–47.1)
HCT VFR BLD AUTO: 28 % (ref 36.3–47.1)
HCT VFR BLD AUTO: 31.9 % (ref 36.3–47.1)
HCT VFR BLD AUTO: 31.9 % (ref 36.3–47.1)
HCT VFR BLD AUTO: 34.6 % (ref 36.3–47.1)
HGB BLD-MCNC: 10.4 G/DL (ref 11.9–15.1)
HGB BLD-MCNC: 10.5 G/DL (ref 11.9–15.1)
HGB BLD-MCNC: 11.5 G/DL (ref 11.9–15.1)
HGB BLD-MCNC: 7.5 G/DL (ref 11.9–15.1)
HGB BLD-MCNC: 9.2 G/DL (ref 11.9–15.1)
IMMATURE GRANULOCYTES: 0 %
IMMATURE GRANULOCYTES: 0 %
INR PPP: 1.4
INR PPP: 1.6
INR PPP: 2.5
INR PPP: 2.6
KETONES UR STRIP.AUTO-MCNC: NEGATIVE MG/DL
LEUKOCYTE ESTERASE UR QL STRIP.AUTO: NEGATIVE
LYMPHOCYTES # BLD: 13 % (ref 24–43)
LYMPHOCYTES # BLD: 41 % (ref 24–43)
MCH RBC QN AUTO: 30.7 PG (ref 25.2–33.5)
MCH RBC QN AUTO: 30.7 PG (ref 25.2–33.5)
MCH RBC QN AUTO: 30.9 PG (ref 25.2–33.5)
MCHC RBC AUTO-ENTMCNC: 32.6 G/DL (ref 28.4–34.8)
MCHC RBC AUTO-ENTMCNC: 32.9 G/DL (ref 28.4–34.8)
MCHC RBC AUTO-ENTMCNC: 33.2 G/DL (ref 28.4–34.8)
MCV RBC AUTO: 93 FL (ref 82.6–102.9)
MCV RBC AUTO: 93.3 FL (ref 82.6–102.9)
MCV RBC AUTO: 94.1 FL (ref 82.6–102.9)
MONOCYTES # BLD: 4 % (ref 3–12)
MONOCYTES # BLD: 5 % (ref 3–12)
NITRITE UR QL STRIP.AUTO: NEGATIVE
NRBC AUTOMATED: 0 PER 100 WBC
PARTIAL THROMBOPLASTIN TIME: 31.4 SEC (ref 26.8–34.8)
PDW BLD-RTO: 14.9 % (ref 11.8–14.4)
PDW BLD-RTO: 15.1 % (ref 11.8–14.4)
PDW BLD-RTO: 15.2 % (ref 11.8–14.4)
PLATELET # BLD AUTO: 140 K/UL (ref 138–453)
PLATELET # BLD AUTO: 234 K/UL (ref 138–453)
PLATELET # BLD AUTO: 258 K/UL (ref 138–453)
PMV BLD AUTO: 10.4 FL (ref 8.1–13.5)
PMV BLD AUTO: 10.8 FL (ref 8.1–13.5)
PMV BLD AUTO: 11.4 FL (ref 8.1–13.5)
POTASSIUM SERPL-SCNC: 4.2 MMOL/L (ref 3.7–5.3)
POTASSIUM SERPL-SCNC: 4.4 MMOL/L (ref 3.7–5.3)
POTASSIUM SERPL-SCNC: 4.5 MMOL/L (ref 3.7–5.3)
PROCALCITONIN SERPL-MCNC: 0.07 NG/ML
PROT SERPL-MCNC: 6.1 G/DL (ref 6.4–8.3)
PROT UR STRIP.AUTO-MCNC: 6 MG/DL (ref 5–9)
PROT UR STRIP.AUTO-MCNC: NEGATIVE MG/DL
PROTHROMBIN TIME: 16.9 SEC (ref 11.7–14.9)
PROTHROMBIN TIME: 19.3 SEC (ref 11.7–14.9)
PROTHROMBIN TIME: 26.9 SEC (ref 11.9–14.8)
PROTHROMBIN TIME: 27.7 SEC (ref 11.9–14.8)
RBC # BLD: 3 M/UL (ref 3.95–5.11)
RBC # BLD: 3.39 M/UL (ref 3.95–5.11)
RBC # BLD: 3.72 M/UL (ref 3.95–5.11)
RBC # BLD: ABNORMAL 10*6/UL
REASON FOR REJECTION: NORMAL
SEG NEUTROPHILS: 49 % (ref 36–65)
SEG NEUTROPHILS: 82 % (ref 36–65)
SEGMENTED NEUTROPHILS ABSOLUTE COUNT: 11.66 K/UL (ref 1.5–8.1)
SEGMENTED NEUTROPHILS ABSOLUTE COUNT: 4.26 K/UL (ref 1.5–8.1)
SODIUM SERPL-SCNC: 137 MMOL/L (ref 135–144)
SODIUM SERPL-SCNC: 138 MMOL/L (ref 135–144)
SODIUM SERPL-SCNC: 140 MMOL/L (ref 135–144)
SPECIFIC GRAVITY UA: 1.01 (ref 1.01–1.02)
TURBIDITY: CLEAR
URINE HGB: NEGATIVE
UROBILINOGEN, URINE: NORMAL
WBC # BLD AUTO: 12.7 K/UL (ref 3.5–11.3)
WBC # BLD AUTO: 14.2 K/UL (ref 3.5–11.3)
WBC # BLD AUTO: 8.5 K/UL (ref 3.5–11.3)
ZZ NTE CLEAN UP: ORDERED TEST: NORMAL
ZZ NTE WITH NAME CLEAN UP: SPECIMEN SOURCE: NORMAL

## 2023-03-26 PROCEDURE — 36415 COLL VENOUS BLD VENIPUNCTURE: CPT

## 2023-03-26 PROCEDURE — 74177 CT ABD & PELVIS W/CONTRAST: CPT

## 2023-03-26 PROCEDURE — 85652 RBC SED RATE AUTOMATED: CPT

## 2023-03-26 PROCEDURE — 36556 INSERT NON-TUNNEL CV CATH: CPT

## 2023-03-26 PROCEDURE — 93005 ELECTROCARDIOGRAM TRACING: CPT

## 2023-03-26 PROCEDURE — 94761 N-INVAS EAR/PLS OXIMETRY MLT: CPT

## 2023-03-26 PROCEDURE — 96367 TX/PROPH/DG ADDL SEQ IV INF: CPT

## 2023-03-26 PROCEDURE — 80076 HEPATIC FUNCTION PANEL: CPT

## 2023-03-26 PROCEDURE — 85014 HEMATOCRIT: CPT

## 2023-03-26 PROCEDURE — 86920 COMPATIBILITY TEST SPIN: CPT

## 2023-03-26 PROCEDURE — 86850 RBC ANTIBODY SCREEN: CPT

## 2023-03-26 PROCEDURE — 86927 PLASMA FRESH FROZEN: CPT

## 2023-03-26 PROCEDURE — 87641 MR-STAPH DNA AMP PROBE: CPT

## 2023-03-26 PROCEDURE — P9016 RBC LEUKOCYTES REDUCED: HCPCS

## 2023-03-26 PROCEDURE — 99222 1ST HOSP IP/OBS MODERATE 55: CPT | Performed by: SPECIALIST

## 2023-03-26 PROCEDURE — 6360000002 HC RX W HCPCS: Performed by: EMERGENCY MEDICINE

## 2023-03-26 PROCEDURE — 86900 BLOOD TYPING SEROLOGIC ABO: CPT

## 2023-03-26 PROCEDURE — 6360000002 HC RX W HCPCS

## 2023-03-26 PROCEDURE — 85018 HEMOGLOBIN: CPT

## 2023-03-26 PROCEDURE — 85610 PROTHROMBIN TIME: CPT

## 2023-03-26 PROCEDURE — 2580000003 HC RX 258: Performed by: EMERGENCY MEDICINE

## 2023-03-26 PROCEDURE — 99285 EMERGENCY DEPT VISIT HI MDM: CPT

## 2023-03-26 PROCEDURE — 81003 URINALYSIS AUTO W/O SCOPE: CPT

## 2023-03-26 PROCEDURE — 2500000003 HC RX 250 WO HCPCS

## 2023-03-26 PROCEDURE — 86140 C-REACTIVE PROTEIN: CPT

## 2023-03-26 PROCEDURE — 85025 COMPLETE CBC W/AUTO DIFF WBC: CPT

## 2023-03-26 PROCEDURE — 02HV33Z INSERTION OF INFUSION DEVICE INTO SUPERIOR VENA CAVA, PERCUTANEOUS APPROACH: ICD-10-PCS | Performed by: SURGERY

## 2023-03-26 PROCEDURE — 1200000000 HC SEMI PRIVATE

## 2023-03-26 PROCEDURE — 80048 BASIC METABOLIC PNL TOTAL CA: CPT

## 2023-03-26 PROCEDURE — 85730 THROMBOPLASTIN TIME PARTIAL: CPT

## 2023-03-26 PROCEDURE — 36430 TRANSFUSION BLD/BLD COMPNT: CPT

## 2023-03-26 PROCEDURE — 36620 INSERTION CATHETER ARTERY: CPT

## 2023-03-26 PROCEDURE — 84145 PROCALCITONIN (PCT): CPT

## 2023-03-26 PROCEDURE — 6360000004 HC RX CONTRAST MEDICATION: Performed by: PHYSICIAN ASSISTANT

## 2023-03-26 PROCEDURE — 96365 THER/PROPH/DIAG IV INF INIT: CPT

## 2023-03-26 PROCEDURE — 71045 X-RAY EXAM CHEST 1 VIEW: CPT

## 2023-03-26 PROCEDURE — P9017 PLASMA 1 DONOR FRZ W/IN 8 HR: HCPCS

## 2023-03-26 PROCEDURE — 6360000002 HC RX W HCPCS: Performed by: PHYSICIAN ASSISTANT

## 2023-03-26 PROCEDURE — 85027 COMPLETE CBC AUTOMATED: CPT

## 2023-03-26 PROCEDURE — C9113 INJ PANTOPRAZOLE SODIUM, VIA: HCPCS | Performed by: PHYSICIAN ASSISTANT

## 2023-03-26 PROCEDURE — 2580000003 HC RX 258: Performed by: PHYSICIAN ASSISTANT

## 2023-03-26 PROCEDURE — 86901 BLOOD TYPING SEROLOGIC RH(D): CPT

## 2023-03-26 PROCEDURE — 2580000003 HC RX 258

## 2023-03-26 PROCEDURE — 93005 ELECTROCARDIOGRAM TRACING: CPT | Performed by: PHYSICIAN ASSISTANT

## 2023-03-26 PROCEDURE — P9073 PLATELETS PHERESIS PATH REDU: HCPCS

## 2023-03-26 PROCEDURE — 74174 CTA ABD&PLVS W/CONTRAST: CPT

## 2023-03-26 PROCEDURE — C9113 INJ PANTOPRAZOLE SODIUM, VIA: HCPCS

## 2023-03-26 PROCEDURE — 2000000000 HC ICU R&B

## 2023-03-26 PROCEDURE — 93010 ELECTROCARDIOGRAM REPORT: CPT | Performed by: INTERNAL MEDICINE

## 2023-03-26 PROCEDURE — 82330 ASSAY OF CALCIUM: CPT

## 2023-03-26 RX ORDER — POTASSIUM CHLORIDE 29.8 MG/ML
20 INJECTION INTRAVENOUS PRN
Status: DISCONTINUED | OUTPATIENT
Start: 2023-03-26 | End: 2023-03-29 | Stop reason: HOSPADM

## 2023-03-26 RX ORDER — ACETAMINOPHEN 325 MG/1
650 TABLET ORAL EVERY 6 HOURS PRN
Status: DISCONTINUED | OUTPATIENT
Start: 2023-03-26 | End: 2023-03-29

## 2023-03-26 RX ORDER — LOSARTAN POTASSIUM 50 MG/1
100 TABLET ORAL DAILY
Status: CANCELLED | OUTPATIENT
Start: 2023-03-26

## 2023-03-26 RX ORDER — POTASSIUM CHLORIDE 7.45 MG/ML
10 INJECTION INTRAVENOUS PRN
Status: DISCONTINUED | OUTPATIENT
Start: 2023-03-26 | End: 2023-03-29 | Stop reason: HOSPADM

## 2023-03-26 RX ORDER — SODIUM CHLORIDE 9 MG/ML
INJECTION, SOLUTION INTRAVENOUS PRN
Status: DISCONTINUED | OUTPATIENT
Start: 2023-03-26 | End: 2023-03-26 | Stop reason: HOSPADM

## 2023-03-26 RX ORDER — ONDANSETRON 2 MG/ML
4 INJECTION INTRAMUSCULAR; INTRAVENOUS EVERY 6 HOURS PRN
Status: CANCELLED | OUTPATIENT
Start: 2023-03-26

## 2023-03-26 RX ORDER — SODIUM CHLORIDE 0.9 % (FLUSH) 0.9 %
5-40 SYRINGE (ML) INJECTION EVERY 12 HOURS SCHEDULED
Status: CANCELLED | OUTPATIENT
Start: 2023-03-26

## 2023-03-26 RX ORDER — SODIUM CHLORIDE 9 MG/ML
INJECTION, SOLUTION INTRAVENOUS CONTINUOUS
Status: CANCELLED | OUTPATIENT
Start: 2023-03-26

## 2023-03-26 RX ORDER — METOPROLOL TARTRATE 100 MG/1
1 TABLET ORAL 2 TIMES DAILY
Status: CANCELLED | OUTPATIENT
Start: 2023-03-26

## 2023-03-26 RX ORDER — POLYETHYLENE GLYCOL 3350 17 G/17G
17 POWDER, FOR SOLUTION ORAL DAILY PRN
Status: DISCONTINUED | OUTPATIENT
Start: 2023-03-26 | End: 2023-03-29 | Stop reason: HOSPADM

## 2023-03-26 RX ORDER — ACETAMINOPHEN 650 MG/1
650 SUPPOSITORY RECTAL EVERY 6 HOURS PRN
Status: CANCELLED | OUTPATIENT
Start: 2023-03-26

## 2023-03-26 RX ORDER — SODIUM CHLORIDE 0.9 % (FLUSH) 0.9 %
5-40 SYRINGE (ML) INJECTION PRN
Status: CANCELLED | OUTPATIENT
Start: 2023-03-26

## 2023-03-26 RX ORDER — NOREPINEPHRINE BIT/0.9 % NACL 16MG/250ML
1-100 INFUSION BOTTLE (ML) INTRAVENOUS CONTINUOUS
Status: DISCONTINUED | OUTPATIENT
Start: 2023-03-26 | End: 2023-03-27

## 2023-03-26 RX ORDER — INSULIN LISPRO 100 [IU]/ML
0-4 INJECTION, SOLUTION INTRAVENOUS; SUBCUTANEOUS
Status: DISCONTINUED | OUTPATIENT
Start: 2023-03-27 | End: 2023-03-29 | Stop reason: HOSPADM

## 2023-03-26 RX ORDER — INSULIN LISPRO 100 [IU]/ML
0-4 INJECTION, SOLUTION INTRAVENOUS; SUBCUTANEOUS NIGHTLY
Status: DISCONTINUED | OUTPATIENT
Start: 2023-03-26 | End: 2023-03-29 | Stop reason: HOSPADM

## 2023-03-26 RX ORDER — SODIUM CHLORIDE 0.9 % (FLUSH) 0.9 %
5-40 SYRINGE (ML) INJECTION EVERY 12 HOURS SCHEDULED
Status: DISCONTINUED | OUTPATIENT
Start: 2023-03-26 | End: 2023-03-29 | Stop reason: HOSPADM

## 2023-03-26 RX ORDER — NOREPINEPHRINE BIT/0.9 % NACL 16MG/250ML
INFUSION BOTTLE (ML) INTRAVENOUS
Status: COMPLETED
Start: 2023-03-26 | End: 2023-03-26

## 2023-03-26 RX ORDER — SODIUM CHLORIDE 9 MG/ML
INJECTION, SOLUTION INTRAVENOUS PRN
Status: DISCONTINUED | OUTPATIENT
Start: 2023-03-26 | End: 2023-03-28

## 2023-03-26 RX ORDER — SODIUM CHLORIDE 9 MG/ML
INJECTION, SOLUTION INTRAVENOUS CONTINUOUS
Status: DISCONTINUED | OUTPATIENT
Start: 2023-03-26 | End: 2023-03-27

## 2023-03-26 RX ORDER — ONDANSETRON 2 MG/ML
4 INJECTION INTRAMUSCULAR; INTRAVENOUS EVERY 6 HOURS PRN
Status: DISCONTINUED | OUTPATIENT
Start: 2023-03-26 | End: 2023-03-29 | Stop reason: HOSPADM

## 2023-03-26 RX ORDER — 0.9 % SODIUM CHLORIDE 0.9 %
1000 INTRAVENOUS SOLUTION INTRAVENOUS ONCE
Status: COMPLETED | OUTPATIENT
Start: 2023-03-26 | End: 2023-03-26

## 2023-03-26 RX ORDER — GLIPIZIDE 5 MG/1
5 TABLET ORAL
Status: CANCELLED | OUTPATIENT
Start: 2023-03-27

## 2023-03-26 RX ORDER — SODIUM CHLORIDE 0.9 % (FLUSH) 0.9 %
5-40 SYRINGE (ML) INJECTION PRN
Status: DISCONTINUED | OUTPATIENT
Start: 2023-03-26 | End: 2023-03-29 | Stop reason: HOSPADM

## 2023-03-26 RX ORDER — ACETAMINOPHEN 650 MG/1
650 SUPPOSITORY RECTAL EVERY 6 HOURS PRN
Status: DISCONTINUED | OUTPATIENT
Start: 2023-03-26 | End: 2023-03-29

## 2023-03-26 RX ORDER — ONDANSETRON 4 MG/1
4 TABLET, ORALLY DISINTEGRATING ORAL EVERY 8 HOURS PRN
Status: DISCONTINUED | OUTPATIENT
Start: 2023-03-26 | End: 2023-03-29 | Stop reason: HOSPADM

## 2023-03-26 RX ORDER — SODIUM CHLORIDE 9 MG/ML
INJECTION, SOLUTION INTRAVENOUS PRN
Status: DISCONTINUED | OUTPATIENT
Start: 2023-03-26 | End: 2023-03-29

## 2023-03-26 RX ORDER — METRONIDAZOLE 500 MG/100ML
500 INJECTION, SOLUTION INTRAVENOUS EVERY 8 HOURS
Status: DISCONTINUED | OUTPATIENT
Start: 2023-03-26 | End: 2023-03-29

## 2023-03-26 RX ORDER — ATORVASTATIN CALCIUM 40 MG/1
40 TABLET, FILM COATED ORAL NIGHTLY
Status: CANCELLED | OUTPATIENT
Start: 2023-03-26

## 2023-03-26 RX ORDER — SODIUM CHLORIDE 9 MG/ML
INJECTION, SOLUTION INTRAVENOUS PRN
Status: CANCELLED | OUTPATIENT
Start: 2023-03-26

## 2023-03-26 RX ORDER — TRIAMTERENE AND HYDROCHLOROTHIAZIDE 37.5; 25 MG/1; MG/1
1 CAPSULE ORAL DAILY
Status: CANCELLED | OUTPATIENT
Start: 2023-03-26

## 2023-03-26 RX ORDER — CIPROFLOXACIN 2 MG/ML
400 INJECTION, SOLUTION INTRAVENOUS EVERY 12 HOURS
Status: DISCONTINUED | OUTPATIENT
Start: 2023-03-26 | End: 2023-03-29

## 2023-03-26 RX ORDER — DEXTROSE MONOHYDRATE 100 MG/ML
INJECTION, SOLUTION INTRAVENOUS CONTINUOUS PRN
Status: DISCONTINUED | OUTPATIENT
Start: 2023-03-26 | End: 2023-03-29 | Stop reason: HOSPADM

## 2023-03-26 RX ORDER — SODIUM CHLORIDE 9 MG/ML
INJECTION, SOLUTION INTRAVENOUS PRN
Status: DISCONTINUED | OUTPATIENT
Start: 2023-03-26 | End: 2023-03-29 | Stop reason: HOSPADM

## 2023-03-26 RX ORDER — ACETAMINOPHEN 325 MG/1
650 TABLET ORAL EVERY 6 HOURS PRN
Status: CANCELLED | OUTPATIENT
Start: 2023-03-26

## 2023-03-26 RX ORDER — CALCIUM GLUCONATE 20 MG/ML
2000 INJECTION, SOLUTION INTRAVENOUS ONCE
Status: COMPLETED | OUTPATIENT
Start: 2023-03-26 | End: 2023-03-27

## 2023-03-26 RX ORDER — ONDANSETRON 4 MG/1
4 TABLET, ORALLY DISINTEGRATING ORAL EVERY 8 HOURS PRN
Status: CANCELLED | OUTPATIENT
Start: 2023-03-26

## 2023-03-26 RX ADMIN — IOPAMIDOL 75 ML: 755 INJECTION, SOLUTION INTRAVENOUS at 14:09

## 2023-03-26 RX ADMIN — METRONIDAZOLE 500 MG: 500 INJECTION, SOLUTION INTRAVENOUS at 22:28

## 2023-03-26 RX ADMIN — SODIUM CHLORIDE: 9 INJECTION, SOLUTION INTRAVENOUS at 22:08

## 2023-03-26 RX ADMIN — SODIUM CHLORIDE 80 MG: 9 INJECTION, SOLUTION INTRAVENOUS at 12:30

## 2023-03-26 RX ADMIN — PANTOPRAZOLE SODIUM 8 MG/HR: 40 INJECTION, POWDER, FOR SOLUTION INTRAVENOUS at 22:07

## 2023-03-26 RX ADMIN — PHYTONADIONE 10 MG: 10 INJECTION, EMULSION INTRAMUSCULAR; INTRAVENOUS; SUBCUTANEOUS at 13:30

## 2023-03-26 RX ADMIN — SODIUM CHLORIDE, PRESERVATIVE FREE 10 ML: 5 INJECTION INTRAVENOUS at 22:09

## 2023-03-26 RX ADMIN — CALCIUM GLUCONATE 2000 MG: 20 INJECTION, SOLUTION INTRAVENOUS at 22:45

## 2023-03-26 RX ADMIN — Medication 3 MCG/MIN: at 22:09

## 2023-03-26 RX ADMIN — SODIUM CHLORIDE 999 ML: 9 INJECTION, SOLUTION INTRAVENOUS at 12:18

## 2023-03-26 ASSESSMENT — PAIN DESCRIPTION - LOCATION: LOCATION: ABDOMEN

## 2023-03-26 ASSESSMENT — PAIN - FUNCTIONAL ASSESSMENT: PAIN_FUNCTIONAL_ASSESSMENT: 0-10

## 2023-03-26 ASSESSMENT — PAIN SCALES - GENERAL: PAINLEVEL_OUTOF10: 3

## 2023-03-26 ASSESSMENT — PAIN DESCRIPTION - DESCRIPTORS: DESCRIPTORS: DISCOMFORT

## 2023-03-26 NOTE — ED PROVIDER NOTES
Emergency  Physician           Dannielle Morocho MD  03/26/23 1695    EKG interpretation sinus rhythm 89 normal intervals normal axis no acute ST or T changes no acute findings       Dannielle Morocho MD  03/26/23 7038
patient becomes unstable, altered, tachycardic. [AR]   1738 INR 2.5 at 1530 [AR]      ED Course User Index  [AR] Fredy Pierce MD       PROCEDURES:  None    CONSULTS:  IP CONSULT TO GI  IP CONSULT TO CRITICAL CARE      FINAL IMPRESSION      1. Rectal bleeding          DISPOSITION / PLAN     DISPOSITION Admitted 03/26/2023 05:44:41 PM      PATIENT REFERRED TO:  No follow-up provider specified.     DISCHARGE MEDICATIONS:  New Prescriptions    No medications on file       Fabian Ch MD  Emergency Medicine Resident    (Please note that portions of thisnote were completed with a voice recognition program.  Efforts were made to edit the dictations but occasionally words are mis-transcribed.)        Fredy Pierce MD  Resident  03/26/23 3718

## 2023-03-26 NOTE — CONSULTS
Complaint passing blood clots per rectum    Patient is a 68-year-old moderately obese hypertensive diabetic anticoagulated female with a history of bilateral pulmonary embolisms on chronic Coumadin therapy and also has an inferior vena cava filter patient states that she was in her usual state of health until early this morning when she went to the bathroom and passed blood clots into the toilet bowl she denies any nausea or lightheadedness with this event but she was brought to the emergency room where she apparently had a vasovagal event resulting in short loss of consciousness and a diminished blood pressure which subsequently reswallows responded to a fluid resuscitation at present the patient is awaiting for units of FFP    During the course of her interview the patient was awake and alert stated that she was having some crampy discomfort and thought she was about ready to pass further blood clots few moments later the patient became unresponsive and apparently went asystolic requiring chest compressions just prior to this the patient was noted to have a systolic blood pressure of 95 pulse of 80 I believe that this represents relative hypotension in a patient that is normally hypertensive and she is not mounting much of a tachycardia due to beta-blockers    Patient's last colonoscopy was in 2020 and was purportedly told she had a clean bill health from that standpoint    Past medical history  Medical  1. Hypertension  2. Hyperlipidemia  3. Diabetes  4. History of bilateral pulmonary embolus with chronic anticoagulation and an inferior vena cava filter  5. Depression    Surgical  1. Appendectomy  2. Cardiac catheterization  3. Laparoscopic cholecystectomy  4. Foot surgery  5. Hysterectomy  6. Ultrasound-guided biopsy of the thyroid gland  7. Placement of an inferior vena cava filter    Medications  1. Warfarin 5 mg p.o. once a day  2. Prilosec 20 mg p.o. once a day  3.   Lipitor 40 mg p.o. once a day  4.  Amaryl 1 mg p.o. once a day  5. Metformin 1000 mg p.o. 2 times daily  6. Metoprolol 100 mg p.o. once a day  7. Dyazide 1 capsule once a day  8.   Cozaar 100 mg p.o. once a day  Aspirin 325 mg enteric-coated once a day    Allergies none    Social history patient is a former social smoker she does not drink    Family history glaucoma thyroid disease coronary artery disease hypertension hypercholesterolemia    Review of systems patient denies any vertigo diplopia no circumoral numbness dysarthria dysphagia no hot or cold intolerance no history of jaundice hugh colored stools or dark-colored urine appetite is generally good weight is stable no hematemesis hematochezia melena no blood dyscrasias    On physical examination an elderly female in a mild amount of distress  Blood pressure is 90/60 pulse 85 respirations 18 she is afebrile  HEENT normocephalic atraumatic extract muscles intact pupils equal round reactive to light and accommodate oropharynx is clear neck supple without adenopathy  Chest clear to auscultation percussion  Cardiovascular regular rate and rhythm  Abdomen is soft without guarding or rebound no palpable masses patient has obvious blood between her legs rectal examination is deferred  Musculoskeletal 5/5  Neurologically without focal findings    Laboratories PT of 27.7 H&H of 10 and 31 platelet count of 929,890 creatinine of 1.24  CT scan of the abdomen and pelvis purportedly circa for circumferential low-attenuation wall thickening involving a long segment of the descending and sigmoid colon along with some apparent mucosal hyperemia no definite mass lesion is seen no evidence of perforation or bowel obstruction    Assessment and plan this is an elderly female with a history of bilateral pulmonary emboli and placement of inferior vena cava filter who is fully anticoagulated due to Coumadin she began passing blood clots this morning and as of yet her colopathy has not been reversed

## 2023-03-26 NOTE — ED PROVIDER NOTES
within normal limits   BASIC METABOLIC PANEL - Abnormal; Notable for the following components:    Glucose 212 (*)     BUN 25 (*)     Creatinine 1.24 (*)     Est, Glom Filt Rate 46 (*)     All other components within normal limits   PROTIME-INR - Abnormal; Notable for the following components:    Protime 27.7 (*)     All other components within normal limits   HEMOGLOBIN AND HEMATOCRIT - Abnormal; Notable for the following components:    Hemoglobin 10.5 (*)     Hematocrit 31.9 (*)     All other components within normal limits   CBC   BASIC METABOLIC PANEL   PROTIME-INR   APTT   URINALYSIS   TYPE AND SCREEN   PREPARE PLASMA   PREPARE RBC (CROSSMATCH)   ABO/RH   PREPARE RBC (CROSSMATCH)   PREPARE PLASMA       RADIOLOGY:   All plain film, CT, MRI, and formal ultrasound images (except ED bedside ultrasound) are read by the radiologist  CT ABDOMEN PELVIS W IV CONTRAST Additional Contrast? None   Final Result   Lack of oral contrast limits evaluation but there appears to be some   circumferential low-attenuation wall thickening involving a long segment of   descending and sigmoid colon along with some apparent mucosal hyperemia   involving portions of mid to proximal sigmoid colon. No definite discrete   mass lesion is identified; however, some of the hyperemia/enhancement may   have a slightly nodular configuration as above. There may be a solitary   inflamed diverticulum involving the proximal sigmoid colon as above. However, the extent of involvement of large bowel appears to be greater than   expected for diverticulitis and suspect a superimposed nonspecific   infectious/inflammatory colitis. No evidence for a bowel obstruction,   perforation or abscess formation. Recommend follow-up to resolution and   patient may benefit from further evaluation with colonoscopy once acute   symptoms have resolved given the mucosal abnormality noted above since a   neoplastic process is not entirely excluded.       Additional incidental findings as above. 3)  Treatment and Disposition    115PM: was called to pt syncopal event after having bloody diarrhea in the bathroom. Attending physician was called to the room Dr. Gregory Mason, patient was placed on monitor, was alert and oriented but felt near syncopal.  Orders were placed      Dr. Gregory Mason spoke with gen surgery and hospitalist and had patient admitted. 3:29 PM EDT  Pt continues to have active bleeding. Care taken over by Dr. Gregory Mason. Life flight called, pt to be transferred to [facility].       FINAL IMPRESSION      1. Lower GI bleed Stable         DISPOSITION/PLAN     DISPOSITION Decision To Transfer 03/26/2023 03:44:22 PM      (Please note that portions of this note were completed with a voice recognition program.  Efforts were made to edit the dictations but occasionally words are mis-transcribed.)    Emely Chao PA-C (electronically signed)       Emely Chao PA-C  03/26/23 1515  ATTENDING PROVIDER ATTESTATION:     I have personally performed and/or participated in the history, exam, medical decision making, and procedures and agree with all pertinent clinical information. I have also reviewed and agree with the past medical, family and social history unless otherwise noted. ATTENDING PROVIDER ATTESTATION:     I have personally performed and/or participated in the history, exam, medical decision making, and procedures and agree with all pertinent clinical information. I have also reviewed and agree with the past medical, family and social history unless otherwise noted.        Emely Chao PA-C  03/26/23 1625       Kuldeep King MD  04/02/23 2004

## 2023-03-26 NOTE — ED NOTES
Pt became briefly unresponsive in wheel chair while returning to bed after using the bathroom. Lasting less than one minute in duration. Staff x 4 assisted pt back into bed. Dr Hugo Muniz at bedside. VS as charted.       Guerita Clark RN  03/26/23 0258 [TextBox_4] : patient with severe bronchiectasis, copd\par with chronic pseudomonas infection\par has been in [place] and I've been doing a lot of telemed\par \par gave her levofl and she felt much better after an episode of hemoptysis\par \par unfortunatley was not hooked up with doctors out there when it happened\par given in january and it made tremendous difference.  \par \par spending time out in [place]

## 2023-03-26 NOTE — ED NOTES
Pt A&O x4. Denies pain. Sister at bedside. Linen changed at this time.        Maria Luz Lozano RN  03/26/23 1120

## 2023-03-26 NOTE — ED NOTES
Pt changed, occult blood noted, clots noted, brief applied under pt      Fannie Jimenez RN  03/26/23 1042

## 2023-03-26 NOTE — ED NOTES
Pt to ED via life flight 2 as transfer from [facility]. Pt being seen for rectal bleeding, reports clots. Pt takes coumadin for hx of DVT's and PE's. Pt received vitamin K, 2 units PRBC, 2 units FFP, Protonix, Zofran PTA. Pt is a/ox4, resting on stretcher, call light in reach, RR even and non labored.       Beth Bearden RN  03/26/23 3837

## 2023-03-26 NOTE — H&P
Critical Care - History and Physical Examination    Patient's name:  [facility] Record Number: [id number]  Patient's account/billing number: [de-identified]  Patient's YOB: 1950  Age: 67 y.o. Date of Admission: 3/26/2023  5:16 PM  Reason of ICU admission: rectal bleed  Date of History and Physical Examination: 3/26/2023      Primary Care Physician: CATHRYN Hallman CNP  Attending Physician: Dr. John Moody Status: Full Code    Chief complaint: BRBPR, synope     Reason for ICU admission: Rectal bleed      History Of Present Illness:   History was obtained from chart review and the patient. José Box is a 67 y.o. F with a past medical history of DM on metformin, bilateral PE status post IVC filter and on Coumadin, hypertension, hyperlipidemia presents to [facility] after patient had a syncopal episode at home after having a bloody bowel movement. This is the first time this is happening but patient stated that she had been having dark tarry stools for the past few days. On arrival there patient was hemodynamically stable, hemoglobin was around 10, platelet 521,860, INR 2.5 but was having multiple bloody stools, the patient received 2 units RBC, 2 units FFP and 3 L bolus normal saline, and vitamin K. General surgery was consulted who recommended transfer to higher care. Patient briefly lost pulses in the ER there, required 30 seconds CPR, patient woke up by herself, was awake, alert and oriented. Patient did get a CT scan of the abdomen and pelvis, report in chart, concern of diverticular bleed. On arrival here patient was hemodynamically stable, blood pressure on the lower side, was awake, alert and oriented to time, place and person, denies any abdominal pain, nausea or vomiting. Patient had 5 bloody stools since she came to the ICU it was associated with clots.   GI was consulted from the ED who initially mentioned that the patient be

## 2023-03-26 NOTE — ED NOTES
Hopsitalist seeing patient while in ED. Pt just started to actively bleed again and provider at bedside. Pt had episode of losing pulse at 1526 and compressions started. Pt regained pulse and consciousness after about 30 seconds.       Ekaterina Cobb RN  03/26/23 9956

## 2023-03-27 LAB
ABO/RH: NORMAL
ABSOLUTE EOS #: 0.04 K/UL (ref 0–0.44)
ABSOLUTE IMMATURE GRANULOCYTE: 0.05 K/UL (ref 0–0.3)
ABSOLUTE LYMPH #: 2.68 K/UL (ref 1.1–3.7)
ABSOLUTE MONO #: 0.44 K/UL (ref 0.1–1.2)
ALBUMIN SERPL-MCNC: 3.2 G/DL (ref 3.5–5.2)
ALBUMIN/GLOBULIN RATIO: 1.5 (ref 1–2.5)
ALP SERPL-CCNC: 46 U/L (ref 35–104)
ALT SERPL-CCNC: 35 U/L (ref 5–33)
ANION GAP SERPL CALCULATED.3IONS-SCNC: 12 MMOL/L (ref 9–17)
ANTIBODY SCREEN: NEGATIVE
ARM BAND NUMBER: NORMAL
ARM BAND NUMBER: NORMAL
AST SERPL-CCNC: 42 U/L
BASOPHILS # BLD: 0 % (ref 0–2)
BASOPHILS ABSOLUTE: <0.03 K/UL (ref 0–0.2)
BILIRUB SERPL-MCNC: 1.1 MG/DL (ref 0.3–1.2)
BLD PROD TYP BPU: NORMAL
BLOOD BANK BLOOD PRODUCT EXPIRATION DATE: NORMAL
BLOOD BANK ISBT PRODUCT BLOOD TYPE: 1700
BLOOD BANK ISBT PRODUCT BLOOD TYPE: 5100
BLOOD BANK ISBT PRODUCT BLOOD TYPE: 5100
BLOOD BANK ISBT PRODUCT BLOOD TYPE: 6200
BLOOD BANK ISBT PRODUCT BLOOD TYPE: 7300
BLOOD BANK ISBT PRODUCT BLOOD TYPE: 8400
BLOOD BANK ISBT PRODUCT BLOOD TYPE: 8400
BLOOD BANK PRODUCT CODE: NORMAL
BLOOD BANK UNIT TYPE AND RH: NORMAL
BPU ID: NORMAL
BUN SERPL-MCNC: 22 MG/DL (ref 8–23)
CALCIUM SERPL-MCNC: 7.9 MG/DL (ref 8.6–10.4)
CHLORIDE SERPL-SCNC: 108 MMOL/L (ref 98–107)
CO2 SERPL-SCNC: 22 MMOL/L (ref 20–31)
CREAT SERPL-MCNC: 0.94 MG/DL (ref 0.5–0.9)
CROSSMATCH RESULT: NORMAL
CROSSMATCH RESULT: NORMAL
DISPENSE STATUS BLOOD BANK: NORMAL
EKG ATRIAL RATE: 89 BPM
EKG P AXIS: 36 DEGREES
EKG P-R INTERVAL: 154 MS
EKG Q-T INTERVAL: 398 MS
EKG QRS DURATION: 88 MS
EKG QTC CALCULATION (BAZETT): 484 MS
EKG R AXIS: 12 DEGREES
EKG T AXIS: 38 DEGREES
EKG VENTRICULAR RATE: 89 BPM
EOSINOPHILS RELATIVE PERCENT: 1 % (ref 1–4)
EXPIRATION DATE: NORMAL
FIBRINOGEN, FUNCTIONAL TEG: 22.5 MM (ref 15–32)
GFR SERPL CREATININE-BSD FRML MDRD: >60 ML/MIN/1.73M2
GLUCOSE BLD-MCNC: 150 MG/DL (ref 65–105)
GLUCOSE BLD-MCNC: 151 MG/DL (ref 65–105)
GLUCOSE BLD-MCNC: 204 MG/DL (ref 65–105)
GLUCOSE BLD-MCNC: 90 MG/DL (ref 65–105)
GLUCOSE SERPL-MCNC: 95 MG/DL (ref 70–99)
HAV IGM SER QL: NONREACTIVE
HBV CORE IGM SER QL: NONREACTIVE
HBV SURFACE AG SER QL: NONREACTIVE
HBV SURFACE AG SER QL: NONREACTIVE
HCT VFR BLD AUTO: 26.3 % (ref 36.3–47.1)
HCT VFR BLD AUTO: 26.5 % (ref 36.3–47.1)
HCT VFR BLD AUTO: 26.7 % (ref 36.3–47.1)
HCT VFR BLD AUTO: 27.2 % (ref 36.3–47.1)
HCT VFR BLD AUTO: 28 % (ref 36.3–47.1)
HCV AB SER QL: NONREACTIVE
HGB BLD-MCNC: 9.3 G/DL (ref 11.9–15.1)
HGB BLD-MCNC: 9.3 G/DL (ref 11.9–15.1)
HGB BLD-MCNC: 9.4 G/DL (ref 11.9–15.1)
HGB BLD-MCNC: 9.6 G/DL (ref 11.9–15.1)
HGB BLD-MCNC: 9.8 G/DL (ref 11.9–15.1)
HIV 1+2 AB+HIV1 P24 AG SERPL QL IA: NONREACTIVE
IMMATURE GRANULOCYTES: 1 %
INR PPP: 1.2
LACTIC ACID, WHOLE BLOOD: 2.8 MMOL/L (ref 0.7–2.1)
LY30 (LYSIS) TEG: 0 % (ref 0–2.6)
LYMPHOCYTES # BLD: 31 % (ref 24–43)
MA(MAX CLOT) RAPID TEG: 61.5 MM (ref 52–70)
MCH RBC QN AUTO: 30.5 PG (ref 25.2–33.5)
MCHC RBC AUTO-ENTMCNC: 35.5 G/DL (ref 28.4–34.8)
MCV RBC AUTO: 86 FL (ref 82.6–102.9)
MONOCYTES # BLD: 5 % (ref 3–12)
MRSA, DNA, NASAL: NEGATIVE
NRBC AUTOMATED: 0 PER 100 WBC
PDW BLD-RTO: 15.3 % (ref 11.8–14.4)
PLATELET # BLD AUTO: 118 K/UL (ref 138–453)
PMV BLD AUTO: 10.6 FL (ref 8.1–13.5)
POTASSIUM SERPL-SCNC: 3.7 MMOL/L (ref 3.7–5.3)
PROT SERPL-MCNC: 5.4 G/DL (ref 6.4–8.3)
PROTHROMBIN TIME: 15.7 SEC (ref 11.7–14.9)
RBC # BLD: 3.08 M/UL (ref 3.95–5.11)
RBC # BLD: ABNORMAL 10*6/UL
REACTION TIME TEG: 4.6 MIN (ref 4.6–9.1)
SEG NEUTROPHILS: 63 % (ref 36–65)
SEGMENTED NEUTROPHILS ABSOLUTE COUNT: 5.45 K/UL (ref 1.5–8.1)
SODIUM SERPL-SCNC: 142 MMOL/L (ref 135–144)
SPECIMEN DESCRIPTION: NORMAL
TRANSFUSION STATUS: NORMAL
UNIT DIVISION: 0
UNIT ISSUE DATE/TIME: NORMAL
WBC # BLD AUTO: 8.7 K/UL (ref 3.5–11.3)

## 2023-03-27 PROCEDURE — 83605 ASSAY OF LACTIC ACID: CPT

## 2023-03-27 PROCEDURE — 2580000003 HC RX 258

## 2023-03-27 PROCEDURE — 85610 PROTHROMBIN TIME: CPT

## 2023-03-27 PROCEDURE — 6370000000 HC RX 637 (ALT 250 FOR IP)

## 2023-03-27 PROCEDURE — 2000000000 HC ICU R&B

## 2023-03-27 PROCEDURE — 85384 FIBRINOGEN ACTIVITY: CPT

## 2023-03-27 PROCEDURE — 2500000003 HC RX 250 WO HCPCS

## 2023-03-27 PROCEDURE — 6360000002 HC RX W HCPCS

## 2023-03-27 PROCEDURE — 94660 CPAP INITIATION&MGMT: CPT

## 2023-03-27 PROCEDURE — 80074 ACUTE HEPATITIS PANEL: CPT

## 2023-03-27 PROCEDURE — 36415 COLL VENOUS BLD VENIPUNCTURE: CPT

## 2023-03-27 PROCEDURE — 80053 COMPREHEN METABOLIC PANEL: CPT

## 2023-03-27 PROCEDURE — 85025 COMPLETE CBC W/AUTO DIFF WBC: CPT

## 2023-03-27 PROCEDURE — 85347 COAGULATION TIME ACTIVATED: CPT

## 2023-03-27 PROCEDURE — 87389 HIV-1 AG W/HIV-1&-2 AB AG IA: CPT

## 2023-03-27 PROCEDURE — 85390 FIBRINOLYSINS SCREEN I&R: CPT

## 2023-03-27 PROCEDURE — 87340 HEPATITIS B SURFACE AG IA: CPT

## 2023-03-27 PROCEDURE — 99221 1ST HOSP IP/OBS SF/LOW 40: CPT | Performed by: INTERNAL MEDICINE

## 2023-03-27 PROCEDURE — 82947 ASSAY GLUCOSE BLOOD QUANT: CPT

## 2023-03-27 PROCEDURE — 6360000004 HC RX CONTRAST MEDICATION

## 2023-03-27 PROCEDURE — 85014 HEMATOCRIT: CPT

## 2023-03-27 PROCEDURE — 85576 BLOOD PLATELET AGGREGATION: CPT

## 2023-03-27 PROCEDURE — 85018 HEMOGLOBIN: CPT

## 2023-03-27 PROCEDURE — 99291 CRITICAL CARE FIRST HOUR: CPT | Performed by: INTERNAL MEDICINE

## 2023-03-27 PROCEDURE — C9113 INJ PANTOPRAZOLE SODIUM, VIA: HCPCS

## 2023-03-27 RX ORDER — LABETALOL HYDROCHLORIDE 5 MG/ML
10 INJECTION, SOLUTION INTRAVENOUS EVERY 6 HOURS PRN
Status: DISCONTINUED | OUTPATIENT
Start: 2023-03-27 | End: 2023-03-29 | Stop reason: HOSPADM

## 2023-03-27 RX ORDER — HYDRALAZINE HYDROCHLORIDE 20 MG/ML
5 INJECTION INTRAMUSCULAR; INTRAVENOUS EVERY 6 HOURS PRN
Status: DISCONTINUED | OUTPATIENT
Start: 2023-03-27 | End: 2023-03-27

## 2023-03-27 RX ORDER — LOSARTAN POTASSIUM 50 MG/1
100 TABLET ORAL DAILY
Status: DISCONTINUED | OUTPATIENT
Start: 2023-03-27 | End: 2023-03-27

## 2023-03-27 RX ORDER — LOSARTAN POTASSIUM 50 MG/1
50 TABLET ORAL DAILY
Status: DISCONTINUED | OUTPATIENT
Start: 2023-03-27 | End: 2023-03-28

## 2023-03-27 RX ORDER — HYDRALAZINE HYDROCHLORIDE 20 MG/ML
5 INJECTION INTRAMUSCULAR; INTRAVENOUS EVERY 6 HOURS PRN
Status: DISCONTINUED | OUTPATIENT
Start: 2023-03-27 | End: 2023-03-29 | Stop reason: HOSPADM

## 2023-03-27 RX ADMIN — SODIUM CHLORIDE: 9 INJECTION, SOLUTION INTRAVENOUS at 07:47

## 2023-03-27 RX ADMIN — SODIUM CHLORIDE, PRESERVATIVE FREE 10 ML: 5 INJECTION INTRAVENOUS at 07:53

## 2023-03-27 RX ADMIN — CIPROFLOXACIN 400 MG: 2 INJECTION, SOLUTION INTRAVENOUS at 12:39

## 2023-03-27 RX ADMIN — METOPROLOL TARTRATE 50 MG: 25 TABLET, FILM COATED ORAL at 19:57

## 2023-03-27 RX ADMIN — SODIUM CHLORIDE, PRESERVATIVE FREE 10 ML: 5 INJECTION INTRAVENOUS at 19:47

## 2023-03-27 RX ADMIN — METRONIDAZOLE 500 MG: 500 INJECTION, SOLUTION INTRAVENOUS at 19:44

## 2023-03-27 RX ADMIN — LOSARTAN POTASSIUM 50 MG: 50 TABLET, FILM COATED ORAL at 16:28

## 2023-03-27 RX ADMIN — METRONIDAZOLE 500 MG: 500 INJECTION, SOLUTION INTRAVENOUS at 14:21

## 2023-03-27 RX ADMIN — CIPROFLOXACIN 400 MG: 2 INJECTION, SOLUTION INTRAVENOUS at 00:03

## 2023-03-27 RX ADMIN — LABETALOL HYDROCHLORIDE 10 MG: 5 INJECTION, SOLUTION INTRAVENOUS at 08:38

## 2023-03-27 RX ADMIN — METRONIDAZOLE 500 MG: 500 INJECTION, SOLUTION INTRAVENOUS at 06:03

## 2023-03-27 RX ADMIN — IOPAMIDOL 100 ML: 755 INJECTION, SOLUTION INTRAVENOUS at 00:26

## 2023-03-27 RX ADMIN — PANTOPRAZOLE SODIUM 8 MG/HR: 40 INJECTION, POWDER, FOR SOLUTION INTRAVENOUS at 16:21

## 2023-03-27 RX ADMIN — PANTOPRAZOLE SODIUM 8 MG/HR: 40 INJECTION, POWDER, FOR SOLUTION INTRAVENOUS at 06:09

## 2023-03-27 RX ADMIN — INSULIN LISPRO 1 UNITS: 100 INJECTION, SOLUTION INTRAVENOUS; SUBCUTANEOUS at 16:29

## 2023-03-27 ASSESSMENT — PAIN SCALES - GENERAL: PAINLEVEL_OUTOF10: 0

## 2023-03-27 NOTE — FLOWSHEET NOTE
Patient received 1 PRBC prior to MTP activation. MTP activated and started @ 2015 and ended @ 2039. Patient received 4 PRBC, 4 FFP, & 1 Plt. Tolerated well.

## 2023-03-27 NOTE — PROCEDURES
Insert Arterial Line  Date/Time:  03/26/2023, 2340  Performed by: Vita Landeros RCP    Patient identity confirmed: arm band and provided demographic data   Time out: Immediately prior to procedure a \"time out\" was called to verify the correct patient, procedure, equipment, support staff. Preparation: Patient was prepped and draped in the usual sterile fashion.     Location:right radial    Rikki's test normal: yes  Needle gauge: 20     Number of attempts: 1  Post-procedure: transparent dressing applied and line secured    Patient tolerance: well       Vita Landeros RRT
line is placed through the cordis for additional access. The catheter then secured using silk suture and a temporary sterile dressing was applied. No immediate complication was evident. All sponge, instrument and needle counts were correct at the completion of the procedure. Postprocedural chest x-ray showed good position of the catheter with no evidence of hemothorax or pneumothorax. The patient tolerated the procedure well with no immediate complication evident.      Danica Cunningham, DO  3/26/23, 9:21 PM

## 2023-03-27 NOTE — CARE COORDINATION
Case Management Assessment  Initial Evaluation    Date/Time of Evaluation: 3/27/2023 10:46 AM  Assessment Completed by: Domingo Judd RN    If patient is discharged prior to next notation, then this note serves as note for discharge by case management. Patient Name: Uzma Winter                   YOB: 1950  Diagnosis: Rectal bleeding [K62.5]  Rectal bleed [K62.5]                   Date / Time: 3/26/2023  5:16 PM    Patient Admission Status: Inpatient   Readmission Risk (Low < 19, Mod (19-27), High > 27): Readmission Risk Score: 14.9    Current PCP: CATHRYN Hernandez CNP  PCP verified by CM? Yes    Chart Reviewed: Yes      History Provided by: Patient  Patient Orientation: Alert and Oriented, Person, Place, Situation    Patient Cognition: Alert    Hospitalization in the last 30 days (Readmission):  No    If yes, Readmission Assessment in  Navigator will be completed. Advance Directives:      Code Status: Full Code   Patient's Primary Decision Maker is: Legal Next of Kin      Discharge Planning:    Patient lives with: Alone Type of Home: House  Primary Care Giver: Self  Patient Support Systems include: Family Members   Current Financial resources: Medicare  Current community resources:    Current services prior to admission: Durable Medical Equipment, C-pap            Current DME: Glucometer            Type of Home Care services:  None    ADLS  Prior functional level: Independent in ADLs/IADLs  Current functional level: Independent in ADLs/IADLs    PT AM-PAC:   /24  OT AM-PAC:   /24    Family can provide assistance at DC: Yes (sister Nuris Tom and aden Daniel are able to help)  Would you like Case Management to discuss the discharge plan with any other family members/significant others, and if so, who?  No  Plans to Return to Present Housing: Yes  Other Identified Issues/Barriers to RETURNING to current housing: na  Potential Assistance needed at discharge: N/A

## 2023-03-27 NOTE — CONSULTS
bleeding patient was monitored. She received further 5 units PRBC and 4 unit FFP. Due to persistent hypotension she was started on pressor support with Levophed while being continued on maintenance IV fluids and Protonix infusion. Currently patient is hemodynamically stable off pressor support. She denies active symptoms of nausea, abdominal pain, chest pain and SOB. She has not had anymore melanotic stools since evening. Her Hb is stable after transfusions yesterday. She is NPO and continues to be maintenance IV fluids and Protonix infusion. Summary of imaging completed at this time:    CT abdomen and pelvis with IV contrast showed circumferential low-attenuation wall thickening involving minimal segment of descending and sigmoid colon along with separate mucosal hyperemia involving portions of the mid to proximal sigmoid colon. There may be a solitary inflamed diverticulum involving the proximal sigmoid colon as above. The extent of involvement of large bowel appears to be greater than expected for diverticulitis and suspected superimposed nonspecific infectious/inflammatory colitis. No evidence of bowel obstruction, perforation or abscess formation. CTA abdomen and pelvis with contrast showed no active GI bleed. Summary of abnormal labs completed at this time:    Metabolic: BUN 22, creatinine 0.94  Hematology: WBC 14.2 > 8.7, Hb 11.5 > 10.5 > 9.2 > 7.5 > 10.4 > 9.3 > 9.5, platelets 633  Coags: INR 2.6 > 2.5 > 1.6 > 1.4 > 1.2  Liver profile: Albumin 3.2, ALT 35, AST 42, bilirubin 1.1      On physician exam:  Awake, alert and oriented. Chest is clear to auscultation  Per abdomen soft, non distended and non tender. B/l LE edema present.     Previous GI history:   Colonoscopy 2020 by Dr. Kaur Hallmark: Celina Minium sigmoid diverticulosis      Past Medical/Social/Family History:  Past Medical History:   Diagnosis Date    Abnormal cardiovascular stress test 07/05/2012    a small but moderate intensity
1 tablet by mouth 2 times daily (with meals) 10/10/22   [place] GalCATHRYN tracy CNP   metoprolol (LOPRESSOR) 100 MG tablet TAKE 1 TABLET TWICE A DAY 10/10/22   [place] CATHRYN Bill CNP   triamterene-hydroCHLOROthiazide (DYAZIDE) 37.5-25 MG per capsule TAKE 1 CAPSULE EVERY       MORNING 10/10/22   CATHRYN Zambrano CNP   losartan (COZAAR) 100 MG tablet TAKE 1 TABLET ONCE DAILY 10/10/22   CATHRYN Zambrano CNP   Handicap Placard MISC by Does not apply route EXPIRATION DATE: 3 YEARS 10/12/21   CATHRYN Zambrano CNP   Omega-3 Fatty Acids (FISH OIL) 1000 MG CPDR Take 1,000 mg by mouth every other day    Historical Provider, MD   aspirin 325 MG EC tablet Take 325 mg by mouth daily    Historical Provider, MD   MICROLET LANCETS MISC 1 each by Does not apply route daily Dx--E11.9 Non-Insulin dep 8/1/18   Val Perez MD   blood glucose monitor strips Use 1 QD  Dx-E11.9 Non-Insulin dep 8/1/18   Val Perez MD   Multiple Vitamin (MULTIVITAMIN PO) Take 1 tablet by mouth daily. Historical Provider, MD    Scheduled Meds:   sodium chloride flush  5-40 mL IntraVENous 2 times per day    ciprofloxacin  400 mg IntraVENous Q12H    metroNIDAZOLE  500 mg IntraVENous Q8H    calcium gluconate  2,000 mg IntraVENous Once     Continuous Infusions:   pantoprazole 8 mg/hr (03/26/23 2207)    sodium chloride      sodium chloride 100 mL/hr at 03/26/23 2208    sodium chloride      norepinephrine 3 mcg/min (03/26/23 2209)    sodium chloride      sodium chloride       PRN Meds:.sodium chloride flush, sodium chloride, ondansetron **OR** ondansetron, polyethylene glycol, acetaminophen **OR** acetaminophen, potassium chloride **OR** potassium chloride, sodium chloride, sodium chloride, sodium chloride  Allergies  has No Known Allergies.   Family History  family history includes Glaucoma in her mother; Hearing Loss in her mother; Heart Attack in her father; High Blood Pressure in her father; High

## 2023-03-27 NOTE — CONSENT
Informed Consent for Blood Component Transfusion Note    I have discussed with the patient the rationale for blood component transfusion; its benefits in treating or preventing fatigue, organ damage, or death; and its risk which includes mild transfusion reactions, rare risk of blood borne infection, or more serious but rare reactions. I have discussed the alternatives to transfusion, including the risk and consequences of not receiving transfusion. The patient had an opportunity to ask questions and had agreed to proceed with transfusion of blood components.     Electronically signed by Lety Bland MD on 3/27/23 at 4:12 AM EDT

## 2023-03-28 ENCOUNTER — ANESTHESIA EVENT (OUTPATIENT)
Dept: OPERATING ROOM | Age: 73
DRG: 377 | End: 2023-03-28
Payer: MEDICARE

## 2023-03-28 LAB
ABO/RH: NORMAL
ABSOLUTE EOS #: 0.07 K/UL (ref 0–0.44)
ABSOLUTE IMMATURE GRANULOCYTE: 0.04 K/UL (ref 0–0.3)
ABSOLUTE LYMPH #: 1.45 K/UL (ref 1.1–3.7)
ABSOLUTE MONO #: 0.29 K/UL (ref 0.1–1.2)
ALBUMIN SERPL-MCNC: 3.3 G/DL (ref 3.5–5.2)
ALBUMIN/GLOBULIN RATIO: 1.4 (ref 1–2.5)
ALP SERPL-CCNC: 46 U/L (ref 35–104)
ALT SERPL-CCNC: 29 U/L (ref 5–33)
ANION GAP SERPL CALCULATED.3IONS-SCNC: 12 MMOL/L (ref 9–17)
ANTIBODY SCREEN: NEGATIVE
ARM BAND NUMBER: NORMAL
AST SERPL-CCNC: 26 U/L
BASOPHILS # BLD: 0 % (ref 0–2)
BASOPHILS ABSOLUTE: <0.03 K/UL (ref 0–0.2)
BILIRUB SERPL-MCNC: 1.1 MG/DL (ref 0.3–1.2)
BLD PROD TYP BPU: NORMAL
BLOOD BANK BLOOD PRODUCT EXPIRATION DATE: NORMAL
BLOOD BANK ISBT PRODUCT BLOOD TYPE: 7300
BLOOD BANK PRODUCT CODE: NORMAL
BLOOD BANK UNIT TYPE AND RH: NORMAL
BPU ID: NORMAL
BUN SERPL-MCNC: 13 MG/DL (ref 8–23)
CALCIUM SERPL-MCNC: 8.5 MG/DL (ref 8.6–10.4)
CHLORIDE SERPL-SCNC: 107 MMOL/L (ref 98–107)
CO2 SERPL-SCNC: 25 MMOL/L (ref 20–31)
CREAT SERPL-MCNC: 0.84 MG/DL (ref 0.5–0.9)
CROSSMATCH RESULT: NORMAL
DISPENSE STATUS BLOOD BANK: NORMAL
EOSINOPHILS RELATIVE PERCENT: 2 % (ref 1–4)
EXPIRATION DATE: NORMAL
GFR SERPL CREATININE-BSD FRML MDRD: >60 ML/MIN/1.73M2
GLUCOSE BLD-MCNC: 147 MG/DL (ref 65–105)
GLUCOSE BLD-MCNC: 154 MG/DL (ref 65–105)
GLUCOSE BLD-MCNC: 182 MG/DL (ref 65–105)
GLUCOSE SERPL-MCNC: 146 MG/DL (ref 70–99)
HCT VFR BLD AUTO: 26.4 % (ref 36.3–47.1)
HCT VFR BLD AUTO: 27.5 % (ref 36.3–47.1)
HCT VFR BLD AUTO: 31.7 % (ref 36.3–47.1)
HGB BLD-MCNC: 10.3 G/DL (ref 11.9–15.1)
HGB BLD-MCNC: 9.2 G/DL (ref 11.9–15.1)
HGB BLD-MCNC: 9.7 G/DL (ref 11.9–15.1)
IMMATURE GRANULOCYTES: 1 %
LYMPHOCYTES # BLD: 30 % (ref 24–43)
MAGNESIUM SERPL-MCNC: 1.4 MG/DL (ref 1.6–2.6)
MCH RBC QN AUTO: 30.4 PG (ref 25.2–33.5)
MCHC RBC AUTO-ENTMCNC: 34.8 G/DL (ref 28.4–34.8)
MCV RBC AUTO: 87.1 FL (ref 82.6–102.9)
MONOCYTES # BLD: 6 % (ref 3–12)
NRBC AUTOMATED: 0 PER 100 WBC
PDW BLD-RTO: 15.8 % (ref 11.8–14.4)
PLATELET # BLD AUTO: 108 K/UL (ref 138–453)
PMV BLD AUTO: 11.1 FL (ref 8.1–13.5)
POTASSIUM SERPL-SCNC: 3.5 MMOL/L (ref 3.7–5.3)
PROT SERPL-MCNC: 5.6 G/DL (ref 6.4–8.3)
RBC # BLD: 3.03 M/UL (ref 3.95–5.11)
RBC # BLD: ABNORMAL 10*6/UL
SEG NEUTROPHILS: 61 % (ref 36–65)
SEGMENTED NEUTROPHILS ABSOLUTE COUNT: 2.96 K/UL (ref 1.5–8.1)
SODIUM SERPL-SCNC: 144 MMOL/L (ref 135–144)
TRANSFUSION STATUS: NORMAL
UNIT DIVISION: 0
UNIT ISSUE DATE/TIME: NORMAL
WBC # BLD AUTO: 4.8 K/UL (ref 3.5–11.3)

## 2023-03-28 PROCEDURE — 85025 COMPLETE CBC W/AUTO DIFF WBC: CPT

## 2023-03-28 PROCEDURE — 6360000002 HC RX W HCPCS

## 2023-03-28 PROCEDURE — 36415 COLL VENOUS BLD VENIPUNCTURE: CPT

## 2023-03-28 PROCEDURE — 82947 ASSAY GLUCOSE BLOOD QUANT: CPT

## 2023-03-28 PROCEDURE — 85014 HEMATOCRIT: CPT

## 2023-03-28 PROCEDURE — 80053 COMPREHEN METABOLIC PANEL: CPT

## 2023-03-28 PROCEDURE — 85018 HEMOGLOBIN: CPT

## 2023-03-28 PROCEDURE — 6370000000 HC RX 637 (ALT 250 FOR IP)

## 2023-03-28 PROCEDURE — 83735 ASSAY OF MAGNESIUM: CPT

## 2023-03-28 PROCEDURE — 2060000000 HC ICU INTERMEDIATE R&B

## 2023-03-28 PROCEDURE — 2580000003 HC RX 258

## 2023-03-28 PROCEDURE — 2500000003 HC RX 250 WO HCPCS

## 2023-03-28 PROCEDURE — 94761 N-INVAS EAR/PLS OXIMETRY MLT: CPT

## 2023-03-28 PROCEDURE — 99291 CRITICAL CARE FIRST HOUR: CPT | Performed by: INTERNAL MEDICINE

## 2023-03-28 PROCEDURE — 6360000002 HC RX W HCPCS: Performed by: STUDENT IN AN ORGANIZED HEALTH CARE EDUCATION/TRAINING PROGRAM

## 2023-03-28 PROCEDURE — 99232 SBSQ HOSP IP/OBS MODERATE 35: CPT | Performed by: INTERNAL MEDICINE

## 2023-03-28 PROCEDURE — C9113 INJ PANTOPRAZOLE SODIUM, VIA: HCPCS

## 2023-03-28 PROCEDURE — 6370000000 HC RX 637 (ALT 250 FOR IP): Performed by: STUDENT IN AN ORGANIZED HEALTH CARE EDUCATION/TRAINING PROGRAM

## 2023-03-28 RX ORDER — LOSARTAN POTASSIUM 50 MG/1
100 TABLET ORAL DAILY
Status: DISCONTINUED | OUTPATIENT
Start: 2023-03-29 | End: 2023-03-29 | Stop reason: HOSPADM

## 2023-03-28 RX ORDER — LOSARTAN POTASSIUM 50 MG/1
50 TABLET ORAL ONCE
Status: COMPLETED | OUTPATIENT
Start: 2023-03-28 | End: 2023-03-28

## 2023-03-28 RX ORDER — MAGNESIUM SULFATE IN WATER 40 MG/ML
2000 INJECTION, SOLUTION INTRAVENOUS ONCE
Status: COMPLETED | OUTPATIENT
Start: 2023-03-28 | End: 2023-03-28

## 2023-03-28 RX ADMIN — PANTOPRAZOLE SODIUM 8 MG/HR: 40 INJECTION, POWDER, FOR SOLUTION INTRAVENOUS at 14:02

## 2023-03-28 RX ADMIN — SODIUM CHLORIDE, PRESERVATIVE FREE 10 ML: 5 INJECTION INTRAVENOUS at 09:01

## 2023-03-28 RX ADMIN — PANTOPRAZOLE SODIUM 8 MG/HR: 40 INJECTION, POWDER, FOR SOLUTION INTRAVENOUS at 02:27

## 2023-03-28 RX ADMIN — POTASSIUM CHLORIDE 20 MEQ: 29.8 INJECTION, SOLUTION INTRAVENOUS at 06:12

## 2023-03-28 RX ADMIN — METRONIDAZOLE 500 MG: 500 INJECTION, SOLUTION INTRAVENOUS at 12:24

## 2023-03-28 RX ADMIN — CIPROFLOXACIN 400 MG: 2 INJECTION, SOLUTION INTRAVENOUS at 11:03

## 2023-03-28 RX ADMIN — LABETALOL HYDROCHLORIDE 10 MG: 5 INJECTION, SOLUTION INTRAVENOUS at 09:44

## 2023-03-28 RX ADMIN — LOSARTAN POTASSIUM 50 MG: 50 TABLET, FILM COATED ORAL at 13:46

## 2023-03-28 RX ADMIN — LOSARTAN POTASSIUM 50 MG: 50 TABLET, FILM COATED ORAL at 09:04

## 2023-03-28 RX ADMIN — METRONIDAZOLE 500 MG: 500 INJECTION, SOLUTION INTRAVENOUS at 04:02

## 2023-03-28 RX ADMIN — METRONIDAZOLE 500 MG: 500 INJECTION, SOLUTION INTRAVENOUS at 19:49

## 2023-03-28 RX ADMIN — HYDRALAZINE HYDROCHLORIDE 5 MG: 20 INJECTION INTRAMUSCULAR; INTRAVENOUS at 12:29

## 2023-03-28 RX ADMIN — POTASSIUM CHLORIDE 20 MEQ: 29.8 INJECTION, SOLUTION INTRAVENOUS at 05:08

## 2023-03-28 RX ADMIN — HYDRALAZINE HYDROCHLORIDE 5 MG: 20 INJECTION INTRAMUSCULAR; INTRAVENOUS at 21:44

## 2023-03-28 RX ADMIN — SODIUM CHLORIDE, PRESERVATIVE FREE 10 ML: 5 INJECTION INTRAVENOUS at 19:39

## 2023-03-28 RX ADMIN — METOPROLOL TARTRATE 50 MG: 25 TABLET, FILM COATED ORAL at 09:04

## 2023-03-28 RX ADMIN — METOPROLOL TARTRATE 50 MG: 25 TABLET, FILM COATED ORAL at 19:38

## 2023-03-28 RX ADMIN — CIPROFLOXACIN 400 MG: 2 INJECTION, SOLUTION INTRAVENOUS at 00:01

## 2023-03-28 RX ADMIN — MAGNESIUM SULFATE HEPTAHYDRATE 2000 MG: 40 INJECTION, SOLUTION INTRAVENOUS at 06:18

## 2023-03-28 ASSESSMENT — PAIN SCALES - GENERAL: PAINLEVEL_OUTOF10: 0

## 2023-03-29 ENCOUNTER — ANESTHESIA (OUTPATIENT)
Dept: OPERATING ROOM | Age: 73
DRG: 377 | End: 2023-03-29
Payer: MEDICARE

## 2023-03-29 VITALS
DIASTOLIC BLOOD PRESSURE: 67 MMHG | WEIGHT: 269.4 LBS | TEMPERATURE: 97.7 F | HEIGHT: 66 IN | RESPIRATION RATE: 20 BRPM | SYSTOLIC BLOOD PRESSURE: 146 MMHG | BODY MASS INDEX: 43.3 KG/M2 | OXYGEN SATURATION: 92 % | HEART RATE: 84 BPM

## 2023-03-29 LAB
ABSOLUTE EOS #: 0.17 K/UL (ref 0–0.44)
ABSOLUTE IMMATURE GRANULOCYTE: 0.06 K/UL (ref 0–0.3)
ABSOLUTE LYMPH #: 1.93 K/UL (ref 1.1–3.7)
ABSOLUTE MONO #: 0.4 K/UL (ref 0.1–1.2)
ALBUMIN SERPL-MCNC: 3.4 G/DL (ref 3.5–5.2)
ALBUMIN/GLOBULIN RATIO: 1.3 (ref 1–2.5)
ALP SERPL-CCNC: 48 U/L (ref 35–104)
ALT SERPL-CCNC: 34 U/L (ref 5–33)
ANION GAP SERPL CALCULATED.3IONS-SCNC: 11 MMOL/L (ref 9–17)
AST SERPL-CCNC: 41 U/L
BASOPHILS # BLD: 0 % (ref 0–2)
BASOPHILS ABSOLUTE: <0.03 K/UL (ref 0–0.2)
BILIRUB SERPL-MCNC: 1 MG/DL (ref 0.3–1.2)
BUN SERPL-MCNC: 10 MG/DL (ref 8–23)
CALCIUM SERPL-MCNC: 8.9 MG/DL (ref 8.6–10.4)
CHLORIDE SERPL-SCNC: 102 MMOL/L (ref 98–107)
CO2 SERPL-SCNC: 24 MMOL/L (ref 20–31)
CREAT SERPL-MCNC: 0.9 MG/DL (ref 0.5–0.9)
EOSINOPHILS RELATIVE PERCENT: 3 % (ref 1–4)
GFR SERPL CREATININE-BSD FRML MDRD: >60 ML/MIN/1.73M2
GLUCOSE BLD-MCNC: 185 MG/DL (ref 65–105)
GLUCOSE BLD-MCNC: 190 MG/DL (ref 65–105)
GLUCOSE SERPL-MCNC: 143 MG/DL (ref 70–99)
HCT VFR BLD AUTO: 29 % (ref 36.3–47.1)
HCT VFR BLD AUTO: 31.4 % (ref 36.3–47.1)
HGB BLD-MCNC: 10.7 G/DL (ref 11.9–15.1)
HGB BLD-MCNC: 9.5 G/DL (ref 11.9–15.1)
IMMATURE GRANULOCYTES: 1 %
LYMPHOCYTES # BLD: 33 % (ref 24–43)
MAGNESIUM SERPL-MCNC: 1.7 MG/DL (ref 1.6–2.6)
MCH RBC QN AUTO: 30.4 PG (ref 25.2–33.5)
MCHC RBC AUTO-ENTMCNC: 32.8 G/DL (ref 28.4–34.8)
MCV RBC AUTO: 92.7 FL (ref 82.6–102.9)
MONOCYTES # BLD: 7 % (ref 3–12)
NRBC AUTOMATED: 0 PER 100 WBC
PDW BLD-RTO: 16 % (ref 11.8–14.4)
PLATELET # BLD AUTO: 111 K/UL (ref 138–453)
PMV BLD AUTO: 10.8 FL (ref 8.1–13.5)
POC POTASSIUM: 3.2 MMOL/L (ref 3.5–4.5)
POTASSIUM SERPL-SCNC: 3 MMOL/L (ref 3.7–5.3)
POTASSIUM SERPL-SCNC: 3.3 MMOL/L (ref 3.7–5.3)
PROT SERPL-MCNC: 6 G/DL (ref 6.4–8.3)
RBC # BLD: 3.13 M/UL (ref 3.95–5.11)
RBC # BLD: ABNORMAL 10*6/UL
SEG NEUTROPHILS: 57 % (ref 36–65)
SEGMENTED NEUTROPHILS ABSOLUTE COUNT: 3.35 K/UL (ref 1.5–8.1)
SODIUM SERPL-SCNC: 137 MMOL/L (ref 135–144)
WBC # BLD AUTO: 5.9 K/UL (ref 3.5–11.3)

## 2023-03-29 PROCEDURE — 36415 COLL VENOUS BLD VENIPUNCTURE: CPT

## 2023-03-29 PROCEDURE — 0DJ08ZZ INSPECTION OF UPPER INTESTINAL TRACT, VIA NATURAL OR ARTIFICIAL OPENING ENDOSCOPIC: ICD-10-PCS | Performed by: INTERNAL MEDICINE

## 2023-03-29 PROCEDURE — 6360000002 HC RX W HCPCS

## 2023-03-29 PROCEDURE — 6370000000 HC RX 637 (ALT 250 FOR IP)

## 2023-03-29 PROCEDURE — 2580000003 HC RX 258: Performed by: NURSE ANESTHETIST, CERTIFIED REGISTERED

## 2023-03-29 PROCEDURE — 2580000003 HC RX 258

## 2023-03-29 PROCEDURE — 3700000000 HC ANESTHESIA ATTENDED CARE: Performed by: INTERNAL MEDICINE

## 2023-03-29 PROCEDURE — 85025 COMPLETE CBC W/AUTO DIFF WBC: CPT

## 2023-03-29 PROCEDURE — 2500000003 HC RX 250 WO HCPCS: Performed by: NURSE ANESTHETIST, CERTIFIED REGISTERED

## 2023-03-29 PROCEDURE — 3609017100 HC EGD: Performed by: INTERNAL MEDICINE

## 2023-03-29 PROCEDURE — 2500000003 HC RX 250 WO HCPCS

## 2023-03-29 PROCEDURE — 99232 SBSQ HOSP IP/OBS MODERATE 35: CPT | Performed by: INTERNAL MEDICINE

## 2023-03-29 PROCEDURE — 85018 HEMOGLOBIN: CPT

## 2023-03-29 PROCEDURE — 43235 EGD DIAGNOSTIC BRUSH WASH: CPT | Performed by: INTERNAL MEDICINE

## 2023-03-29 PROCEDURE — 80053 COMPREHEN METABOLIC PANEL: CPT

## 2023-03-29 PROCEDURE — 6370000000 HC RX 637 (ALT 250 FOR IP): Performed by: INTERNAL MEDICINE

## 2023-03-29 PROCEDURE — C9113 INJ PANTOPRAZOLE SODIUM, VIA: HCPCS

## 2023-03-29 PROCEDURE — 83735 ASSAY OF MAGNESIUM: CPT

## 2023-03-29 PROCEDURE — 7100000001 HC PACU RECOVERY - ADDTL 15 MIN: Performed by: INTERNAL MEDICINE

## 2023-03-29 PROCEDURE — 7100000000 HC PACU RECOVERY - FIRST 15 MIN: Performed by: INTERNAL MEDICINE

## 2023-03-29 PROCEDURE — 85014 HEMATOCRIT: CPT

## 2023-03-29 PROCEDURE — 6360000002 HC RX W HCPCS: Performed by: NURSE ANESTHETIST, CERTIFIED REGISTERED

## 2023-03-29 PROCEDURE — 82947 ASSAY GLUCOSE BLOOD QUANT: CPT

## 2023-03-29 PROCEDURE — 84132 ASSAY OF SERUM POTASSIUM: CPT

## 2023-03-29 RX ORDER — DEXMEDETOMIDINE HYDROCHLORIDE 4 UG/ML
.1-1.5 INJECTION, SOLUTION INTRAVENOUS CONTINUOUS
Status: DISCONTINUED | OUTPATIENT
Start: 2023-03-29 | End: 2023-03-29

## 2023-03-29 RX ORDER — SODIUM CHLORIDE 0.9 % (FLUSH) 0.9 %
5-40 SYRINGE (ML) INJECTION EVERY 12 HOURS SCHEDULED
Status: DISCONTINUED | OUTPATIENT
Start: 2023-03-29 | End: 2023-03-29 | Stop reason: HOSPADM

## 2023-03-29 RX ORDER — ENOXAPARIN SODIUM 150 MG/ML
1 INJECTION SUBCUTANEOUS 2 TIMES DAILY
Status: DISCONTINUED | OUTPATIENT
Start: 2023-04-03 | End: 2023-03-29 | Stop reason: HOSPADM

## 2023-03-29 RX ORDER — WARFARIN SODIUM 5 MG/1
TABLET ORAL
Qty: 90 TABLET | Refills: 1 | Status: SHIPPED | OUTPATIENT
Start: 2023-04-03 | End: 2023-03-29 | Stop reason: SDUPTHER

## 2023-03-29 RX ORDER — SODIUM CHLORIDE 9 MG/ML
INJECTION, SOLUTION INTRAVENOUS PRN
Status: DISCONTINUED | OUTPATIENT
Start: 2023-03-29 | End: 2023-03-29 | Stop reason: HOSPADM

## 2023-03-29 RX ORDER — HYDRALAZINE HYDROCHLORIDE 20 MG/ML
5 INJECTION INTRAMUSCULAR; INTRAVENOUS ONCE
Status: COMPLETED | OUTPATIENT
Start: 2023-03-29 | End: 2023-03-29

## 2023-03-29 RX ORDER — SODIUM CHLORIDE 0.9 % (FLUSH) 0.9 %
5-40 SYRINGE (ML) INJECTION PRN
Status: DISCONTINUED | OUTPATIENT
Start: 2023-03-29 | End: 2023-03-29 | Stop reason: HOSPADM

## 2023-03-29 RX ORDER — ENOXAPARIN SODIUM 150 MG/ML
1 INJECTION SUBCUTANEOUS 2 TIMES DAILY
Qty: 4.8 ML | Refills: 0 | Status: SHIPPED | OUTPATIENT
Start: 2023-04-03 | End: 2023-03-29 | Stop reason: SDUPTHER

## 2023-03-29 RX ORDER — ENOXAPARIN SODIUM 150 MG/ML
1 INJECTION SUBCUTANEOUS 2 TIMES DAILY
Qty: 4.8 ML | Refills: 0 | Status: SHIPPED | OUTPATIENT
Start: 2023-04-03 | End: 2023-04-06

## 2023-03-29 RX ORDER — PROPOFOL 10 MG/ML
INJECTION, EMULSION INTRAVENOUS PRN
Status: DISCONTINUED | OUTPATIENT
Start: 2023-03-29 | End: 2023-03-29 | Stop reason: SDUPTHER

## 2023-03-29 RX ORDER — SODIUM CHLORIDE, SODIUM LACTATE, POTASSIUM CHLORIDE, CALCIUM CHLORIDE 600; 310; 30; 20 MG/100ML; MG/100ML; MG/100ML; MG/100ML
INJECTION, SOLUTION INTRAVENOUS CONTINUOUS PRN
Status: DISCONTINUED | OUTPATIENT
Start: 2023-03-29 | End: 2023-03-29 | Stop reason: SDUPTHER

## 2023-03-29 RX ORDER — LIDOCAINE HYDROCHLORIDE 10 MG/ML
INJECTION, SOLUTION EPIDURAL; INFILTRATION; INTRACAUDAL; PERINEURAL PRN
Status: DISCONTINUED | OUTPATIENT
Start: 2023-03-29 | End: 2023-03-29 | Stop reason: SDUPTHER

## 2023-03-29 RX ORDER — PANTOPRAZOLE SODIUM 40 MG/1
40 TABLET, DELAYED RELEASE ORAL
Status: DISCONTINUED | OUTPATIENT
Start: 2023-03-30 | End: 2023-03-29 | Stop reason: HOSPADM

## 2023-03-29 RX ORDER — WARFARIN SODIUM 5 MG/1
TABLET ORAL
Qty: 90 TABLET | Refills: 1 | Status: SHIPPED | OUTPATIENT
Start: 2023-04-03

## 2023-03-29 RX ORDER — METOPROLOL TARTRATE 100 MG/1
100 TABLET ORAL 2 TIMES DAILY
Qty: 60 TABLET | Refills: 3 | Status: SHIPPED | OUTPATIENT
Start: 2023-03-29

## 2023-03-29 RX ORDER — METOPROLOL TARTRATE 100 MG/1
100 TABLET ORAL 2 TIMES DAILY
Qty: 60 TABLET | Refills: 3 | Status: SHIPPED | OUTPATIENT
Start: 2023-03-29 | End: 2023-03-29 | Stop reason: SDUPTHER

## 2023-03-29 RX ADMIN — HYDRALAZINE HYDROCHLORIDE 5 MG: 20 INJECTION INTRAMUSCULAR; INTRAVENOUS at 06:45

## 2023-03-29 RX ADMIN — HYDRALAZINE HYDROCHLORIDE 5 MG: 20 INJECTION INTRAMUSCULAR; INTRAVENOUS at 06:29

## 2023-03-29 RX ADMIN — METOPROLOL TARTRATE 75 MG: 25 TABLET, FILM COATED ORAL at 11:31

## 2023-03-29 RX ADMIN — SODIUM CHLORIDE, POTASSIUM CHLORIDE, SODIUM LACTATE AND CALCIUM CHLORIDE: 600; 310; 30; 20 INJECTION, SOLUTION INTRAVENOUS at 08:33

## 2023-03-29 RX ADMIN — PROPOFOL 100 MG: 10 INJECTION, EMULSION INTRAVENOUS at 08:38

## 2023-03-29 RX ADMIN — METRONIDAZOLE 500 MG: 500 INJECTION, SOLUTION INTRAVENOUS at 03:38

## 2023-03-29 RX ADMIN — PANTOPRAZOLE SODIUM 8 MG/HR: 40 INJECTION, POWDER, FOR SOLUTION INTRAVENOUS at 02:00

## 2023-03-29 RX ADMIN — CIPROFLOXACIN 400 MG: 2 INJECTION, SOLUTION INTRAVENOUS at 00:04

## 2023-03-29 RX ADMIN — POTASSIUM CHLORIDE 10 MEQ: 7.46 INJECTION, SOLUTION INTRAVENOUS at 07:23

## 2023-03-29 RX ADMIN — POTASSIUM BICARBONATE 40 MEQ: 782 TABLET, EFFERVESCENT ORAL at 11:32

## 2023-03-29 RX ADMIN — POTASSIUM CHLORIDE 10 MEQ: 7.46 INJECTION, SOLUTION INTRAVENOUS at 06:11

## 2023-03-29 RX ADMIN — LIDOCAINE HYDROCHLORIDE 50 MG: 10 INJECTION, SOLUTION EPIDURAL; INFILTRATION; INTRACAUDAL; PERINEURAL at 08:38

## 2023-03-29 RX ADMIN — LOSARTAN POTASSIUM 100 MG: 50 TABLET, FILM COATED ORAL at 11:32

## 2023-03-29 RX ADMIN — LABETALOL HYDROCHLORIDE 10 MG: 5 INJECTION, SOLUTION INTRAVENOUS at 04:04

## 2023-03-29 ASSESSMENT — ENCOUNTER SYMPTOMS
HEMOPTYSIS: 0
HEMATOCHEZIA: 0
ABDOMINAL PAIN: 0
COUGH: 0
CONSTIPATION: 0
SHORTNESS OF BREATH: 0
NAUSEA: 0
VOMITING: 0

## 2023-03-29 ASSESSMENT — PAIN - FUNCTIONAL ASSESSMENT: PAIN_FUNCTIONAL_ASSESSMENT: NONE - DENIES PAIN

## 2023-03-29 NOTE — ANESTHESIA PRE PROCEDURE
Department of Anesthesiology  Preprocedure Note       Name:  Ramirez Brown   Age:  67 y.o.  :  1950                                          MRN:  [id number]         Date:  3/29/2023      Surgeon: Antonella Espinosa):  Mathew Bustos MD    Procedure: Procedure(s):  EGD ESOPHAGOGASTRODUODENOSCOPY    Medications prior to admission:   Prior to Admission medications    Medication Sig Start Date End Date Taking?  Authorizing Provider   losartan (COZAAR) 100 MG tablet TAKE 1 TABLET ONCE DAILY 3/27/23   CATHRYN Renae CNP   triamterene-hydroCHLOROthiazide (DYAZIDE) 37.5-25 MG per capsule TAKE 1 CAPSULE EVERY       MORNING 3/27/23   CATHRYN Renae CNP   warfarin (JANTOVEN) 5 MG tablet Coumadin Clinic:  7.5 mg po every Monday and Friday; 5 mg po all other days  Patient taking differently: Coumadin Clinic:  7.5 mg po every Monday and Friday; 5 mg po all other days 10/27/22   CATHRYN Renae CNP   omeprazole (PRILOSEC) 20 MG delayed release capsule TAKE 1 CAPSULE ONCE DAILY 10/18/22   CATHRYN Renae CNP   atorvastatin (LIPITOR) 40 MG tablet TAKE 1 TABLET ONCE DAILY 10/10/22   CATHRYN Renae CNP   glimepiride (AMARYL) 1 MG tablet TAKE 1 TABLET EVERY MORNINGBEFORE BREAKFAST 10/10/22   CATHRYN Renae CNP   metFORMIN (GLUCOPHAGE) 1000 MG tablet Take 1 tablet by mouth 2 times daily (with meals) 10/10/22   CATHRYN Renae CNP   metoprolol (LOPRESSOR) 100 MG tablet TAKE 1 TABLET TWICE A DAY 10/10/22   CATHRYN Renae CNP   Handicap Placard MISC by Does not apply route EXPIRATION DATE: 3 YEARS 10/12/21   CATHRYN Renae CNP   Omega-3 Fatty Acids (FISH OIL) 1000 MG CPDR Take 1,000 mg by mouth every other day    Historical Provider, MD   aspirin 325 MG EC tablet Take 325 mg by mouth daily    Historical Provider, MD   MICROLET LANCETS [address] 1 each by Does not apply route daily Dx--E11.9 Non-Insulin dep 18   Dina Large

## 2023-03-29 NOTE — ANESTHESIA POSTPROCEDURE EVALUATION
Department of Anesthesiology  Postprocedure Note    Patient: Joshua Vinson  MRN: [id number]  YOB: 1950  Date of evaluation: 3/29/2023      Procedure Summary     Date: 03/29/23 Room / Location: [address]    Anesthesia Start: 6453 Anesthesia Stop: 2428    Procedure: EGD ESOPHAGOGASTRODUODENOSCOPY Diagnosis:       Melena      (MELENA)    Surgeons: Patricia Calderon MD Responsible Provider: Jyoti Brennan MD    Anesthesia Type: MAC ASA Status: 3          Anesthesia Type: No value filed.     Sanjuana Phase I: Sanjuana Score: 10    Sanjuana Phase II:        Anesthesia Post Evaluation    Patient location during evaluation: bedside  Patient participation: complete - patient participated  Level of consciousness: awake  Airway patency: patent  Nausea & Vomiting: no nausea and no vomiting  Complications: no  Cardiovascular status: hemodynamically stable  Respiratory status: acceptable  Hydration status: stable  Comments: BP (!) 164/94   Pulse 89   Temp 97 °F (36.1 °C) (Temporal)   Resp 15   Ht 5' 6\" (1.676 m)   Wt 269 lb 6.4 oz (122.2 kg)   LMP  (LMP Unknown)   SpO2 99%   BMI 43.48 kg/m²

## 2023-03-29 NOTE — PROGRESS NOTES
Dr Comfort Guillermo aware of POCT potassium results.
Procedure explained to pt. Pt verb understanding.
SPIRITUAL CARE DEPARTMENT - [address]  PROGRESS NOTE    Shift date: 3.28.2023  Shift day: Tuesday   Shift # 2    Room # 3024/3024-01   Name: Catarino Mejía                Episcopal: unknown   Place of Scientologist: unknown    Referral: Routine Visit    Admit Date & Time: 3/26/2023  5:16 PM    Assessment:  Catarino Mejía is a 67 y.o. female in the hospital. Upon entering the room writer observes patient appearing to be calm and coping. Patient states that she is getting really good care. Intervention:  Writer introduced self and title as  Writer offered space for the patient  to express feelings, needs, and concerns and provided a ministry presence. Outcome:  Patient appears to be calm and coping. Plan:  Chaplains will remain available to offer spiritual and emotional support as needed.       Electronically signed by Riana Black on 3/29/2023 at [address]  [phone]       03/28/23 1915   Encounter Summary   Service Provided For: Patient   Referral/Consult From: [facility] Family members   Last Encounter  03/28/23   Complexity of Encounter Moderate   Begin Time 1915   End Time  1930   Total Time Calculated 15 min   Encounter    Type Follow up   Assessment/Intervention/Outcome   Assessment Calm;Coping   Intervention Active listening;Discussed illness injury and its impact   Outcome Coping     Electronically signed by Balbina Cunningham on 3/29/2023 at 1:46 AM
(!) 170/74 -- -- 74 13 91 %   03/29/23 0145 -- -- -- 76 16 90 %   03/29/23 0130 -- -- -- 76 16 90 %   03/29/23 0115 -- -- -- 66 15 94 %   03/29/23 0100 (!) 156/81 -- -- 70 15 94 %   03/29/23 0045 -- -- -- 67 15 93 %   03/29/23 0030 -- -- -- 69 13 94 %   03/29/23 0015 -- -- -- 72 17 94 %   03/29/23 0000 (!) 176/83 97.7 °F (36.5 °C) Oral 71 14 95 %   03/28/23 2345 -- -- -- 69 13 96 %   03/28/23 2330 (!) 155/72 -- -- 74 15 96 %   03/28/23 2315 -- -- -- 75 16 95 %   03/28/23 2300 (!) 171/72 -- -- 74 13 97 %   03/28/23 2245 -- -- -- 75 15 97 %   03/28/23 2230 (!) 174/72 -- -- 77 12 96 %   03/28/23 2216 -- -- -- 78 -- 98 %   03/28/23 2215 -- -- -- 77 16 95 %   03/28/23 2200 (!) 182/71 97.7 °F (36.5 °C) Oral 76 25 95 %   03/28/23 2145 (!) 185/70 -- -- 74 20 95 %   03/28/23 2144 (!) 185/70 -- -- -- -- --   03/28/23 2130 -- -- -- 72 17 93 %   03/28/23 2115 -- -- -- 84 24 96 %   03/28/23 2100 (!) 166/73 -- -- 74 18 92 %   03/28/23 2045 (!) 177/77 -- -- 73 18 92 %   03/28/23 2030 -- -- -- 73 19 92 %   03/28/23 2015 -- -- -- 74 17 94 %   03/28/23 2000 (!) 176/70 -- -- 94 27 96 %   03/28/23 1945 (!) 153/73 98.1 °F (36.7 °C) Axillary 71 16 96 %   03/28/23 1938 (!) 153/73 -- -- 73 -- --   03/28/23 1930 -- -- -- 78 16 97 %   03/28/23 1915 -- -- -- 72 22 93 %   03/28/23 1900 (!) 153/73 -- -- 74 17 95 %         PHYSICAL EXAM:  Constitutional: Appears well, no distress  EENT: PERRLA, EOMI, sclera clear, anicteric, oropharynx clear  Neck: Supple, symmetrical, trachea midline, no jvd, minimal erythema at site of R IJ consistent with margins of Tegaderm, no active bleed or sign of infection. Respiratory: clear to auscultation, no wheezes or rales and unlabored breathing. No intercostal tenderness  Cardiovascular: regular rate and rhythm, normal S1, S2, no murmur noted and 2+ pulses throughout  Abdomen: soft, nontender, nondistended, no masses or organomegaly  NEUROLOGIC: Awake, AOX3.  Power and sensation grossly intact in all
LIPASE, AMMONIA in the last 72 hours. Acute Hepatitis Panel   Lab Results   Component Value Date/Time    HEPBSAG NONREACTIVE 03/27/2023 05:41 AM    HEPCAB NONREACTIVE 03/27/2023 05:41 AM    HEPBIGM NONREACTIVE 03/27/2023 05:41 AM    HEPAIGM NONREACTIVE 03/27/2023 05:41 AM       HCV Genotype   No results found for: HEPATITISCGENOTYPE    HCV Quantitative   No results found for: HCVQNT    LIVER WORK UP:    AFP  No results found for: AFP    Alpha 1 antitrypsin   No results found for: A1A    Anti - Liver/Kidney Ab  No results found for: LIVER-KIDNEYMICROSOMALAB    JIN  No results found for: JIN    AMA  No results found for: MITOAB    ASMA  No results found for: SMOOTHMUSCAB    Ceruloplasmin  No results found for: CERULOPLSM    Celiac panel  No results found for: TISSTRNTIIGG, TTGIGA, IGA    IgG  No results found for: IGG    IgM  No results found for: IGM    GGT   No results found for: LABGGT    PT/INR  Recent Labs     03/26/23  1908 03/26/23  2154 03/27/23  0541   PROTIME 19.3* 16.9* 15.7*   INR 1.6 1.4 1.2       Cancer Markers:  CEA:  No results found for: CEA  Ca 125:  No results found for:   Ca 19-9:   No results found for:   AFP: No results found for: AFP    Lactic acid:  Recent Labs     03/27/23  0104   LACTACIDWB 2.8*         Radiology Review:    XR CHEST (SINGLE VIEW FRONTAL)    Result Date: 3/26/2023  EXAMINATION: ONE XRAY VIEW OF THE CHEST 3/26/2023 9:44 pm COMPARISON: 04/18/2022 HISTORY: ORDERING SYSTEM PROVIDED HISTORY: line placement TECHNOLOGIST PROVIDED HISTORY: line placement FINDINGS: Right internal jugular line with tip in the SVC. No pneumothorax. Interstitial prominence throughout both lungs is stable. Cardiomediastinal silhouette and bony thorax are unchanged. No evidence of pleural effusion or pneumothorax. Right internal jugular line with tip in the SVC. No pneumothorax. Stable exam otherwise.      CT ABDOMEN PELVIS W IV CONTRAST Additional Contrast? None    Result Date:
Total critical care time caring for this patient with life threatening, unstable organ failure, including direct patient contact, management of life support systems, review of data including imaging and labs, discussions with other team members and physicians at least 27   Min so far today, excluding procedures. Electronically signed by Jeremías Otoole MD on   3/27/23 at 6:01 PM EDT    Please note that this chart was generated using voice recognition Dragon dictation software. Although every effort was made to ensure the accuracy of this automated transcription, some errors in transcription may have occurred.
additions . Cont cpap for noelle   Increase bb and arb   Dc lines   Hb stable   Cld   Egd per gi   Transfer to floor   Will follow for pul        Total critical care time caring for this patient with life threatening, unstable organ failure, including direct patient contact, management of life support systems, review of data including imaging and labs, discussions with other team members and physicians at least 27   Min so far today, excluding procedures. Electronically signed by Bob Mclain MD on   3/28/23 at 3:05 PM EDT    Please note that this chart was generated using voice recognition Dragon dictation software. Although every effort was made to ensure the accuracy of this automated transcription, some errors in transcription may have occurred.

## 2023-03-29 NOTE — OP NOTE
EGD      Patient:   Ryan Mullen    :    1950    Facility:   [facility]   Referring/PCP: CATHRYN Beyer CNP    Procedure:   Esophagogastroduodenoscopy --diagnostic  Date:     3/29/2023   Endoscopist:  Anika Westbrook MD, [facility]    Indication:   Melena    Postprocedure diagnosis:   Small hiatal hernia. Anesthesia:  MAC    Complications: None    EBL: None from the procedure    Specimen: None    Description of Procedure:  Informed consent was obtained from the patient after explanation of the procedure including indications, description of the procedure,  benefits and possible risks and complications of the procedure, and alternatives. Questions were answered. The patient's history was reviewed and a directed physical examination was performed prior to the procedure. Patient was monitored throughout the procedure with pulse oximetry and periodic assessment of vital signs. Patient was sedated as noted above. With the patient in the left lateral decubitus position, the Olympus videoendoscope was placed in the patient's mouth and under direct visualization passed into the esophagus. Visualization of the esophagus, stomach, and duodenum was performed during both introduction and withdrawal of the endoscope and retroflexed view of the proximal stomach was obtained. The scope was passed to the 2nd portion of the duodenum. The patient tolerated the procedure well and was taken to the recovery area in good condition. Findings[de-identified]   Esophagus: Esophagus was normal  Stomach: Stomach had small sliding hiatal hernia. Stomach was otherwise normal.  Duodenum: normal    Recommendations: PPI daily. Okay to discharge from GI standpoint. Hold anticoagulation for another 2 days. We will sign off. Please call with any questions.     Electronically signed by Anika Westbrook MD, [facility]  on 3/29/2023 at 8:46 AM

## 2023-03-29 NOTE — DISCHARGE INSTRUCTIONS
You were admitted to the ICU for a gastrointestinal bleed, this bleeding has stopped and your upper GI scope did not show any upper GI bleed. You are on warfarin (coumadin), which predisposes you to a bleeding. 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 Start taking your warfarin dose as usual on Monday April 3rd and schedule an appointment to be seen on Wednesday April 5. In addition to the warfarin, start taking the lovenox injection as you were shown today. Take the lovenox every 12 hours starting on Monday April 3rd until your appointment at the warfarin clinic, you will be given further instruction on whether or not take continue your lovenox injection at the clinic. Take all your other medicine as you were before except the following:  - STOP taking the aspirin, confirm the reason for aspirin with your family doctor  - Take 100mg of metoprolol (lopressor) twice daily, this is an increase from your original dose    Given your admission to the ICU, it is strongly recommended that you make an appointment with your family doctor as soon as possible. Discuss the rationale for aspirin with your family doctor at that time. Do not take aspirin until you discuss with your family doctor. Return to the emergency department as soon as possible if you notice any of the following: black/bloody stool, coughing/vomiting blood, worsening abdominal or chest pain, worsening dizziness or lightheadedness, worsening shortness of breath, or progressing fatigue.

## 2023-03-29 NOTE — PLAN OF CARE
Hg stable x 4. GI evaluated, not concerned for active bleeding at this time. No plan for colonoscopy at this time. General Surgery to sign off, please reach out with questions/concerns.        Jose Francisco Soria, DO  General Surgery, PGY-2
Problem: Discharge Planning  Goal: Discharge to home or other facility with appropriate resources  3/27/2023 1314 by Hannah Adame RN  Outcome: Progressing  3/27/2023 0426 by Payal Figueredo RN  Outcome: Progressing     Problem: Skin/Tissue Integrity  Goal: Absence of new skin breakdown  Description: 1. Monitor for areas of redness and/or skin breakdown  2. Assess vascular access sites hourly  3. Every 4-6 hours minimum:  Change oxygen saturation probe site  4. Every 4-6 hours:  If on nasal continuous positive airway pressure, respiratory therapy assess nares and determine need for appliance change or resting period.   3/27/2023 1314 by Hannah Adame RN  Outcome: Progressing  3/27/2023 0426 by Payal Figueredo RN  Outcome: Progressing     Problem: Safety - Adult  Goal: Free from fall injury  3/27/2023 1314 by Hannah Adame RN  Outcome: Progressing  3/27/2023 0426 by Payal Figueredo RN  Outcome: Progressing     Problem: ABCDS Injury Assessment  Goal: Absence of physical injury  3/27/2023 1314 by Hannah Adame RN  Outcome: Progressing  3/27/2023 0426 by Payal Figueredo RN  Outcome: Progressing     Problem: Gastrointestinal - Adult  Goal: Minimal or absence of nausea and vomiting  3/27/2023 1314 by Hannah Adame RN  Outcome: Progressing  3/27/2023 0426 by Payal Figueredo RN  Outcome: Progressing  Goal: Maintains or returns to baseline bowel function  3/27/2023 1314 by Hannah Adame RN  Outcome: Progressing  3/27/2023 0426 by Payal Figueredo RN  Outcome: Progressing  Goal: Maintains adequate nutritional intake  3/27/2023 1314 by Hannah Adame RN  Outcome: Progressing  3/27/2023 0426 by Payal Figueredo RN  Outcome: Progressing  Goal: Establish and maintain optimal ostomy function  3/27/2023 1314 by Hannah Adame RN  Outcome: Progressing  3/27/2023 0426 by Payal Figueredo RN  Outcome: Progressing     Problem: Genitourinary - Adult  Goal: Absence of urinary
Problem: Discharge Planning  Goal: Discharge to home or other facility with appropriate resources  3/27/2023 2044 by Tamia Brown RN  Outcome: Progressing     Problem: Skin/Tissue Integrity  Goal: Absence of new skin breakdown  Description: 1. Monitor for areas of redness and/or skin breakdown  2. Assess vascular access sites hourly  3. Every 4-6 hours minimum:  Change oxygen saturation probe site  4. Every 4-6 hours:  If on nasal continuous positive airway pressure, respiratory therapy assess nares and determine need for appliance change or resting period.   3/27/2023 2044 by Tamia Brown RN  Outcome: Progressing     Problem: Safety - Adult  Goal: Free from fall injury  3/27/2023 2044 by Tamia Brown RN  Outcome: Progressing     Problem: ABCDS Injury Assessment  Goal: Absence of physical injury  3/27/2023 2044 by Tamia Brown RN  Outcome: Progressing     Problem: Gastrointestinal - Adult  Goal: Minimal or absence of nausea and vomiting  3/27/2023 2044 by Tamia Brown RN  Outcome: Progressing     Problem: Gastrointestinal - Adult  Goal: Maintains or returns to baseline bowel function  3/27/2023 2044 by Tamia Brown RN  Outcome: Progressing     Problem: Gastrointestinal - Adult  Goal: Maintains adequate nutritional intake  3/27/2023 2044 by Tamia Brown RN  Outcome: Progressing
Problem: Discharge Planning  Goal: Discharge to home or other facility with appropriate resources  3/28/2023 2215 by Ellen Chavis RN  Outcome: Progressing     Problem: Skin/Tissue Integrity  Goal: Absence of new skin breakdown  Description: 1. Monitor for areas of redness and/or skin breakdown  2. Assess vascular access sites hourly  3. Every 4-6 hours minimum:  Change oxygen saturation probe site  4. Every 4-6 hours:  If on nasal continuous positive airway pressure, respiratory therapy assess nares and determine need for appliance change or resting period.   3/28/2023 2215 by Ellen Chavis RN  Outcome: Progressing     Problem: Safety - Adult  Goal: Free from fall injury  3/28/2023 2215 by Ellen Chavis RN  Outcome: Progressing     Problem: ABCDS Injury Assessment  Goal: Absence of physical injury  3/28/2023 2215 by Ellen Chavis RN  Outcome: Progressing
Problem: Discharge Planning  Goal: Discharge to home or other facility with appropriate resources  Outcome: Progressing     Problem: Skin/Tissue Integrity  Goal: Absence of new skin breakdown  Description: 1. Monitor for areas of redness and/or skin breakdown  2. Assess vascular access sites hourly  3. Every 4-6 hours minimum:  Change oxygen saturation probe site  4. Every 4-6 hours:  If on nasal continuous positive airway pressure, respiratory therapy assess nares and determine need for appliance change or resting period.   Outcome: Progressing     Problem: Safety - Adult  Goal: Free from fall injury  Outcome: Progressing     Problem: ABCDS Injury Assessment  Goal: Absence of physical injury  Outcome: Progressing     Problem: Gastrointestinal - Adult  Goal: Minimal or absence of nausea and vomiting  Outcome: Progressing  Goal: Maintains or returns to baseline bowel function  Outcome: Progressing  Goal: Maintains adequate nutritional intake  Outcome: Progressing  Goal: Establish and maintain optimal ostomy function  Outcome: Progressing     Problem: Genitourinary - Adult  Goal: Absence of urinary retention  Outcome: Progressing  Goal: Urinary catheter remains patent  Outcome: Progressing     Problem: Infection - Adult  Goal: Absence of infection at discharge  Outcome: Progressing  Goal: Absence of infection during hospitalization  Outcome: Progressing  Goal: Absence of fever/infection during anticipated neutropenic period  Outcome: Progressing     Problem: Metabolic/Fluid and Electrolytes - Adult  Goal: Electrolytes maintained within normal limits  Outcome: Progressing  Goal: Hemodynamic stability and optimal renal function maintained  Outcome: Progressing  Goal: Glucose maintained within prescribed range  Outcome: Progressing     Problem: Hematologic - Adult  Goal: Maintains hematologic stability  Outcome: Progressing
Progressing     Problem: Metabolic/Fluid and Electrolytes - Adult  Goal: Glucose maintained within prescribed range  Outcome: Progressing     Problem: Hematologic - Adult  Goal: Maintains hematologic stability  Outcome: Progressing     Problem: Chronic Conditions and Co-morbidities  Goal: Patient's chronic conditions and co-morbidity symptoms are monitored and maintained or improved  Outcome: Progressing

## 2023-03-29 NOTE — DISCHARGE SUMMARY
[address] [place] [address]     Department of Internal Medicine - Critical Care Service    INPATIENT DISCHARGE SUMMARY      PATIENT IDENTIFICATION:  NAME:  Aleks Garay   :   1950  MRN:    [id number]     Acct:    [de-identified]   Admit Date:  3/26/2023  Discharge date:  3/29/2023  7:54 PM   Attending Provider: No att. providers found                                     Principal Problem:    Rectal bleed  Active Problems:    Essential hypertension    Type 2 diabetes mellitus without complication, without long-term current use of insulin (HCC)    Rectal bleeding  Resolved Problems:    * No resolved hospital problems. *       REASON FOR HOSPITALIZATION:   Chief Complaint   Patient presents with    Rectal Bleeding          Hospital Course  41-year-old female with past medical history of diabetes on metformin, bilateral PE status post IVC filter and on Coumadin, hypertension, hyperlipidemia initially presented to [facility] after she had a syncopal episode at home after having a bloody bowel movement. She also complained of black tarry stool for the last few days. She was hemodynamically stable, INR was 2.5, was having multiple bloody stools, received 2 unit PRBCs, 2 unit of blood. 3 L bolus and vitamin K.  CT abdomen was suggestive of diverticulitis with superimposed nonspecific infectious/inflammatory colitis. She briefly lost pulses in the ER, required 30 seconds of CPR, patient woke up by herself and was awake alert and oriented. Patient was transferred to [facility] [place]. On arrival here she was hemodynamically stable, had multiple bloody stools associated with clots. CTA abdomen was negative for active GI bleed. Her blood pressure started dropping, hemoglobin was down to 7.2, INR 1.6. MTP was activated. General surgery was consulted for Cordis placement. GI was consulted, plan for colonoscopy. She was hypotensive, required levophed. Bleeding had stopped and H&H was monitored.  Patient

## 2023-03-29 NOTE — CARE COORDINATION
Met with patient to discuss transitional planning. She's returning home. She has a lot of support from family and friends. She denies needs and has transportation home. Notified by Dr Eryn David that patient needs a follow up appt at the medication management clinic on 3/5/2023. Patient relates that she goes to the clinic in [place] [address]. Spoke to [employer].  Patient is scheduled for 3/5/2023 at 11:20am. Patient notified    Discharge [address] Case Management Department  Written by: Nelia Yoder RN    Patient Name: Erich Tiwair  Attending Provider: Eri Celis MD  Admit Date: 3/26/2023  5:16 PM  MRN: [id number]  Account: [de-identified]                     : 1950  Discharge Date: 3/29/2023      Disposition: home    Nelia Yoder RN

## 2023-04-04 ENCOUNTER — HOSPITAL ENCOUNTER (OUTPATIENT)
Age: 73
Discharge: HOME | End: 2023-04-04
Payer: MEDICARE

## 2023-04-04 ENCOUNTER — HOSPITAL ENCOUNTER (OUTPATIENT)
Dept: PHARMACY | Age: 73
Setting detail: THERAPIES SERIES
Discharge: HOME | End: 2023-04-04
Payer: MEDICARE

## 2023-04-04 VITALS
HEART RATE: 63 BPM | SYSTOLIC BLOOD PRESSURE: 165 MMHG | DIASTOLIC BLOOD PRESSURE: 88 MMHG | BODY MASS INDEX: 42.29 KG/M2 | WEIGHT: 262 LBS

## 2023-04-04 DIAGNOSIS — E83.52 HIGH CALCIUM LEVELS: ICD-10-CM

## 2023-04-04 DIAGNOSIS — K92.2 GASTROINTESTINAL HEMORRHAGE, UNSPECIFIED GASTROINTESTINAL HEMORRHAGE TYPE: ICD-10-CM

## 2023-04-04 DIAGNOSIS — Z79.01 LONG TERM CURRENT USE OF ANTICOAGULANT THERAPY: Primary | Chronic | ICD-10-CM

## 2023-04-04 LAB
ABSOLUTE EOS #: 0.42 K/UL (ref 0–0.44)
ABSOLUTE IMMATURE GRANULOCYTE: <0.03 K/UL (ref 0–0.3)
ABSOLUTE LYMPH #: 2.39 K/UL (ref 1.1–3.7)
ABSOLUTE MONO #: 0.52 K/UL (ref 0.1–1.2)
ALBUMIN SERPL-MCNC: 4.6 G/DL (ref 3.5–5.2)
ALBUMIN/GLOBULIN RATIO: 1.2 (ref 1–2.5)
ALP SERPL-CCNC: 69 U/L (ref 35–104)
ALT SERPL-CCNC: 48 U/L (ref 5–33)
ANION GAP SERPL CALCULATED.3IONS-SCNC: 14 MMOL/L (ref 9–17)
AST SERPL-CCNC: 48 U/L
BASOPHILS # BLD: 0 % (ref 0–2)
BASOPHILS ABSOLUTE: 0.03 K/UL (ref 0–0.2)
BILIRUB SERPL-MCNC: 0.9 MG/DL (ref 0.3–1.2)
BUN SERPL-MCNC: 17 MG/DL (ref 8–23)
BUN/CREAT BLD: 16 (ref 9–20)
CALCIUM SERPL-MCNC: 10.8 MG/DL (ref 8.6–10.4)
CHLORIDE SERPL-SCNC: 99 MMOL/L (ref 98–107)
CO2 SERPL-SCNC: 24 MMOL/L (ref 20–31)
CREAT SERPL-MCNC: 1.07 MG/DL (ref 0.5–0.9)
EOSINOPHILS RELATIVE PERCENT: 6 % (ref 1–4)
GFR SERPL CREATININE-BSD FRML MDRD: 55 ML/MIN/1.73M2
GLUCOSE SERPL-MCNC: 148 MG/DL (ref 70–99)
HCT VFR BLD AUTO: 35 % (ref 36.3–47.1)
HGB BLD-MCNC: 11.6 G/DL (ref 11.9–15.1)
IMMATURE GRANULOCYTES: 0 %
INR BLD: 1
LYMPHOCYTES # BLD: 35 % (ref 24–43)
MCH RBC QN AUTO: 30.9 PG (ref 25.2–33.5)
MCHC RBC AUTO-ENTMCNC: 33.1 G/DL (ref 28.4–34.8)
MCV RBC AUTO: 93.1 FL (ref 82.6–102.9)
MONOCYTES # BLD: 8 % (ref 3–12)
NRBC AUTOMATED: 0 PER 100 WBC
PDW BLD-RTO: 15.8 % (ref 11.8–14.4)
PLATELET # BLD AUTO: 241 K/UL (ref 138–453)
PMV BLD AUTO: 10.2 FL (ref 8.1–13.5)
POTASSIUM SERPL-SCNC: 4.4 MMOL/L (ref 3.7–5.3)
PROT SERPL-MCNC: 8.3 G/DL (ref 6.4–8.3)
RBC # BLD: 3.76 M/UL (ref 3.95–5.11)
SEG NEUTROPHILS: 51 % (ref 36–65)
SEGMENTED NEUTROPHILS ABSOLUTE COUNT: 3.54 K/UL (ref 1.5–8.1)
SODIUM SERPL-SCNC: 137 MMOL/L (ref 135–144)
WBC # BLD AUTO: 6.9 K/UL (ref 3.5–11.3)

## 2023-04-04 PROCEDURE — 80053 COMPREHEN METABOLIC PANEL: CPT

## 2023-04-04 PROCEDURE — 85610 PROTHROMBIN TIME: CPT

## 2023-04-04 PROCEDURE — 85025 COMPLETE CBC W/AUTO DIFF WBC: CPT

## 2023-04-04 PROCEDURE — 36415 COLL VENOUS BLD VENIPUNCTURE: CPT

## 2023-04-04 PROCEDURE — 99211 OFF/OP EST MAY X REQ PHY/QHP: CPT

## 2023-04-04 NOTE — PATIENT INSTRUCTIONS
Continue Lovenox injections twice a day. Please take warfarin 7.5 mg today. Continue current dose of warfarin as instructed on dosing calendar provided. Continue to monitor urine and stool for signs and symptoms of bleeding. Please notify the clinic of any medication changes. Please remember to bring all medications (both prescription and OTC) to your next visit. Kindly notify the clinic if you are unable to make to your next appointment.

## 2023-04-04 NOTE — PROGRESS NOTES
Skip [address] [place] [address]  Medication Management  ANTICOAGULATION    Referring Provider: Dr. Laquita Oconnor INR: 2-3    TODAY'S INR: 1    WARFARIN Dosage: Patient will take warfarin 7.5 mg po today, then continue 7.5 mg po every Monday and Friday; 5 mg po all other days    Patient will continue Lovenox injections SUBQ BID - recheck INR in 3 days. INR (no units)   Date Value   2023 1   2023 1.2   2023 1.4   2023 1.6   2023 2.5   2023 2.6   2023 2.4       Hemoglobin   Date Value Ref Range Status   2023 10.7 (L) 11.9 - 15.1 g/dL Final     Hematocrit   Date Value Ref Range Status   2023 31.4 (L) 36.3 - 47.1 % Final     ALT   Date Value Ref Range Status   2023 34 (H) 5 - 33 U/L Final     AST   Date Value Ref Range Status   2023 41 (H) <32 U/L Final       Medication changes:  Lovenox BID    Notes:    Fingerstick INR drawn per clinic protocol. Patient states no visible blood in urine and no black tarry stool. No change in other maintenance medications or in diet. Will recheck INR in 3 days. Patient acknowledges working in consult agreement with pharmacist as referred by his/her physician. Patient was hospitalized for rectal bleeding 3/26/23-3/29/23. INR was 2.5, was having multiple bloody stools, received 2 unit PRBCs, 2 unit of blood. 3 L bolus and vitamin K.  CT abdomen was suggestive of diverticulitis with superimposed nonspecific infectious/inflammatory colitis. Warfarin resumed 4/3/23. Patient also started BID Lovenox 4/3/23. For Pharmacy Admin Tracking Only    Intervention Detail: Adherence Monitorin and Dose Adjustment: 1, reason: Improve Adherence, Therapy Optimization  Total # of Interventions Recommended: 2  Total # of Interventions Accepted: 2  Time Spent (min): PERRY/ Alejandro 66.  [place], [facility]

## 2023-04-07 ENCOUNTER — HOSPITAL ENCOUNTER (OUTPATIENT)
Dept: PHARMACY | Age: 73
Setting detail: THERAPIES SERIES
Discharge: HOME | End: 2023-04-07
Payer: MEDICARE

## 2023-04-07 VITALS
HEART RATE: 76 BPM | DIASTOLIC BLOOD PRESSURE: 76 MMHG | BODY MASS INDEX: 42.13 KG/M2 | SYSTOLIC BLOOD PRESSURE: 143 MMHG | WEIGHT: 261 LBS

## 2023-04-07 DIAGNOSIS — Z79.01 LONG TERM CURRENT USE OF ANTICOAGULANT THERAPY: Primary | Chronic | ICD-10-CM

## 2023-04-07 LAB — INR BLD: 1

## 2023-04-07 PROCEDURE — 85610 PROTHROMBIN TIME: CPT

## 2023-04-07 PROCEDURE — 99211 OFF/OP EST MAY X REQ PHY/QHP: CPT

## 2023-04-07 NOTE — PATIENT INSTRUCTIONS
Please take warfarin 10 mg today and tomorrow. Continue current dose of warfarin as instructed on dosing calendar provided. Continue to monitor urine and stool for signs and symptoms of bleeding. Please notify the clinic of any medication changes.

## 2023-04-10 PROBLEM — K62.5 RECTAL BLEED: Status: RESOLVED | Noted: 2023-03-26 | Resolved: 2023-04-10

## 2023-04-10 PROBLEM — K92.2 GI BLEED: Status: RESOLVED | Noted: 2023-03-26 | Resolved: 2023-04-10

## 2023-04-18 ENCOUNTER — HOSPITAL ENCOUNTER (OUTPATIENT)
Dept: NON INVASIVE DIAGNOSTICS | Age: 73
Discharge: HOME | End: 2023-04-18
Payer: MEDICARE

## 2023-04-18 ENCOUNTER — HOSPITAL ENCOUNTER (OUTPATIENT)
Dept: NON INVASIVE DIAGNOSTICS | Age: 73
Discharge: HOME | End: 2023-04-18

## 2023-04-18 DIAGNOSIS — R07.9 CHEST PAIN, UNSPECIFIED TYPE: ICD-10-CM

## 2023-04-18 DIAGNOSIS — I46.9 ASYSTOLE (HCC): ICD-10-CM

## 2023-04-18 PROCEDURE — 93017 CV STRESS TEST TRACING ONLY: CPT

## 2023-04-18 PROCEDURE — 6360000002 HC RX W HCPCS: Performed by: FAMILY MEDICINE

## 2023-04-18 PROCEDURE — 3430000000 HC RX DIAGNOSTIC RADIOPHARMACEUTICAL: Performed by: INTERNAL MEDICINE

## 2023-04-18 PROCEDURE — A9500 TC99M SESTAMIBI: HCPCS | Performed by: INTERNAL MEDICINE

## 2023-04-18 RX ORDER — TETRAKIS(2-METHOXYISOBUTYLISOCYANIDE)COPPER(I) TETRAFLUOROBORATE 1 MG/ML
30 INJECTION, POWDER, LYOPHILIZED, FOR SOLUTION INTRAVENOUS
Status: COMPLETED | OUTPATIENT
Start: 2023-04-18 | End: 2023-04-18

## 2023-04-18 RX ADMIN — Medication 30 MILLICURIE: at 10:45

## 2023-04-18 RX ADMIN — REGADENOSON 0.4 MG: 0.08 INJECTION, SOLUTION INTRAVENOUS at 10:56

## 2023-04-19 ENCOUNTER — HOSPITAL ENCOUNTER (OUTPATIENT)
Dept: NON INVASIVE DIAGNOSTICS | Age: 73
Discharge: HOME | End: 2023-04-19
Payer: MEDICARE

## 2023-04-19 DIAGNOSIS — R07.9 CHEST PAIN, UNSPECIFIED TYPE: ICD-10-CM

## 2023-04-19 DIAGNOSIS — I46.9 ASYSTOLE (HCC): ICD-10-CM

## 2023-04-19 PROCEDURE — A9500 TC99M SESTAMIBI: HCPCS | Performed by: NURSE PRACTITIONER

## 2023-04-19 PROCEDURE — 93246 EXT ECG>7D<15D RECORDING: CPT

## 2023-04-19 PROCEDURE — 93247 EXT ECG>7D<15D SCAN A/R: CPT

## 2023-04-19 PROCEDURE — 78452 HT MUSCLE IMAGE SPECT MULT: CPT

## 2023-04-19 PROCEDURE — 3430000000 HC RX DIAGNOSTIC RADIOPHARMACEUTICAL: Performed by: NURSE PRACTITIONER

## 2023-04-19 RX ORDER — TETRAKIS(2-METHOXYISOBUTYLISOCYANIDE)COPPER(I) TETRAFLUOROBORATE 1 MG/ML
30 INJECTION, POWDER, LYOPHILIZED, FOR SOLUTION INTRAVENOUS
Status: COMPLETED | OUTPATIENT
Start: 2023-04-19 | End: 2023-04-19

## 2023-04-19 RX ADMIN — Medication 30 MILLICURIE: at 14:01

## 2023-04-20 NOTE — PROCEDURES
[address] [facility], [address] [facility]                              CARDIAC STRESS TEST    PATIENT NAME: Ruben Yao                  :        1950  MED REC NO:   [id number]                              ROOM:  ACCOUNT NO:   [de-identified]                           ADMIT DATE: 2023  PROVIDER:     Vonda Gutierrez MD    CARDIOVASCULAR DIAGNOSTIC DEPARTMENT    DATE OF STUDY:  2023    ORDERING PROVIDER:  Lorri Horowitz CNP    PRIMARY CARE PROVIDER:  Jet Parrish. Abdon Quinones MD    INTERPRETING PHYSICIAN:  Mellisa Petty MD    PHARMACOLOGIC MYOCARDIAL PERFUSION STRESS TESTING    Stress/Rest single isotope SPECT imaging with exercise stress and gated  SPECT imaging. INDICATIONS:  Assessment of chest pain and/or chest discomfort. CLINICAL HISTORY:  The patient is a 40-year-old woman with no known  coronary artery disease. Previous cardiac history includes:  Stress test.    Other previous history includes:  Chest pain, diabetes mellitus,  arthritis, caffeine, hypertension, family history of CAD <60 in father. Symptoms just prior to testing include:  None. Relevant medications:  Metoprolol (Toprol). PROCEDURE:  The heart rate was 74 at baseline and glenn to 96 beats per  minute during the regadenoson infusion. The rest blood pressure was  158/90 mm/Hg and increased to 220/102 mm/Hg. The patient did complain  of shortness of breath following infusion. Pharmacologic stress testing was performed with regadenoson at a dose of  0.4 mg. Additionally, low level exercise using slow treadmill walking  was performed along with vasodilator infusion. MYOCARDIAL PERFUSION IMAGING:  Imaging was performed at rest 30-45  minutes following the injection of 30 mCi of sestamibi. Approximately  10 seconds after Lexiscan injection, the patient was injected with 30  mCi of sestamibi.   Gating post-stress tomographic imaging was

## 2023-04-21 NOTE — RESULT ENCOUNTER NOTE
Please call pt and inform them their stress test results are abnormal.  Need eval by cardiology asap.   Please refer to Dr. Enzo Fulton

## 2023-04-25 ENCOUNTER — HOSPITAL ENCOUNTER (OUTPATIENT)
Dept: PHARMACY | Age: 73
Setting detail: THERAPIES SERIES
Discharge: HOME | End: 2023-04-25
Payer: MEDICARE

## 2023-04-25 VITALS
DIASTOLIC BLOOD PRESSURE: 80 MMHG | SYSTOLIC BLOOD PRESSURE: 185 MMHG | HEART RATE: 59 BPM | BODY MASS INDEX: 42.45 KG/M2 | WEIGHT: 263 LBS

## 2023-04-25 DIAGNOSIS — Z79.01 LONG TERM CURRENT USE OF ANTICOAGULANT THERAPY: Primary | Chronic | ICD-10-CM

## 2023-04-25 LAB — INR BLD: 2.5

## 2023-04-25 PROCEDURE — 99211 OFF/OP EST MAY X REQ PHY/QHP: CPT

## 2023-04-25 PROCEDURE — 85610 PROTHROMBIN TIME: CPT

## 2023-04-25 NOTE — PROGRESS NOTES
[address]  Medication Management  ANTICOAGULATION    Referring Provider: Dr. Melissa Mcdonald INR: 2-3    TODAY'S INR: 2.5    WARFARIN Dosage: Continue warfarin 7.5 mg po every Monday and Friday; 5 mg po all other days    INR (no units)   Date Value   2023 2.5   04/10/2023 1.7   2023 1   2023 1   2023 1.2   2023 1.4   2023 1.6       Hemoglobin   Date Value Ref Range Status   2023 11.6 (L) 11.9 - 15.1 g/dL Final     Hematocrit   Date Value Ref Range Status   2023 35.0 (L) 36.3 - 47.1 % Final     ALT   Date Value Ref Range Status   2023 48 (H) 5 - 33 U/L Final     AST   Date Value Ref Range Status   2023 48 (H) <32 U/L Final       Medication changes:  No changes    Notes:    Fingerstick INR drawn per clinic protocol. Patient states no visible blood in urine and no black tarry stool. Denies any missed doses of warfarin. No change in other maintenance medications or in diet. Will recheck INR in 3 weeks. Patient acknowledges working in consult agreement with pharmacist as referred by his/her physician. Patient had abnormal stress test.  Patient referred to cardiology. She has appointment with Dr. Teddy Mccracken 23. For Pharmacy Admin Tracking Only    Intervention Detail: Adherence Monitorin  Total # of Interventions Recommended: 1  Total # of Interventions Accepted: 1  Time Spent (min): KATT Allen 66.  Juan Carlos Cox, [facility]

## 2023-05-09 ENCOUNTER — HOSPITAL ENCOUNTER (OUTPATIENT)
Age: 73
Discharge: HOME | End: 2023-05-09
Payer: MEDICARE

## 2023-05-09 ENCOUNTER — OFFICE VISIT (OUTPATIENT)
Dept: CARDIOLOGY | Age: 73
End: 2023-05-09
Payer: MEDICARE

## 2023-05-09 VITALS
HEIGHT: 66 IN | HEART RATE: 68 BPM | DIASTOLIC BLOOD PRESSURE: 73 MMHG | BODY MASS INDEX: 41.95 KG/M2 | SYSTOLIC BLOOD PRESSURE: 147 MMHG | OXYGEN SATURATION: 96 % | WEIGHT: 261 LBS | RESPIRATION RATE: 18 BRPM

## 2023-05-09 DIAGNOSIS — R94.39 ABNORMAL STRESS TEST: ICD-10-CM

## 2023-05-09 DIAGNOSIS — I10 PRIMARY HYPERTENSION: ICD-10-CM

## 2023-05-09 DIAGNOSIS — E78.2 MIXED HYPERLIPIDEMIA: ICD-10-CM

## 2023-05-09 DIAGNOSIS — R94.39 ABNORMAL STRESS TEST: Primary | ICD-10-CM

## 2023-05-09 DIAGNOSIS — I25.10 MILD CAD: ICD-10-CM

## 2023-05-09 LAB
ANION GAP SERPL CALCULATED.3IONS-SCNC: 15 MMOL/L (ref 9–17)
BUN SERPL-MCNC: 23 MG/DL (ref 8–23)
BUN/CREAT BLD: 24 (ref 9–20)
CALCIUM SERPL-MCNC: 10.4 MG/DL (ref 8.6–10.4)
CHLORIDE SERPL-SCNC: 102 MMOL/L (ref 98–107)
CO2 SERPL-SCNC: 24 MMOL/L (ref 20–31)
CREAT SERPL-MCNC: 0.94 MG/DL (ref 0.5–0.9)
GFR SERPL CREATININE-BSD FRML MDRD: >60 ML/MIN/1.73M2
GLUCOSE SERPL-MCNC: 178 MG/DL (ref 70–99)
HCT VFR BLD AUTO: 35.3 % (ref 36.3–47.1)
HGB BLD-MCNC: 11.5 G/DL (ref 11.9–15.1)
MCH RBC QN AUTO: 30.7 PG (ref 25.2–33.5)
MCHC RBC AUTO-ENTMCNC: 32.6 G/DL (ref 28.4–34.8)
MCV RBC AUTO: 94.1 FL (ref 82.6–102.9)
NRBC AUTOMATED: 0 PER 100 WBC
PDW BLD-RTO: 15.9 % (ref 11.8–14.4)
PLATELET # BLD AUTO: 279 K/UL (ref 138–453)
PMV BLD AUTO: 10.3 FL (ref 8.1–13.5)
POTASSIUM SERPL-SCNC: 4 MMOL/L (ref 3.7–5.3)
RBC # BLD: 3.75 M/UL (ref 3.95–5.11)
SODIUM SERPL-SCNC: 141 MMOL/L (ref 135–144)
WBC # BLD AUTO: 7.9 K/UL (ref 3.5–11.3)

## 2023-05-09 PROCEDURE — 3017F COLORECTAL CA SCREEN DOC REV: CPT | Performed by: FAMILY MEDICINE

## 2023-05-09 PROCEDURE — 85027 COMPLETE CBC AUTOMATED: CPT

## 2023-05-09 PROCEDURE — 3078F DIAST BP <80 MM HG: CPT | Performed by: FAMILY MEDICINE

## 2023-05-09 PROCEDURE — 1090F PRES/ABSN URINE INCON ASSESS: CPT | Performed by: FAMILY MEDICINE

## 2023-05-09 PROCEDURE — 1036F TOBACCO NON-USER: CPT | Performed by: FAMILY MEDICINE

## 2023-05-09 PROCEDURE — 99211 OFF/OP EST MAY X REQ PHY/QHP: CPT | Performed by: FAMILY MEDICINE

## 2023-05-09 PROCEDURE — G8427 DOCREV CUR MEDS BY ELIG CLIN: HCPCS | Performed by: FAMILY MEDICINE

## 2023-05-09 PROCEDURE — G8417 CALC BMI ABV UP PARAM F/U: HCPCS | Performed by: FAMILY MEDICINE

## 2023-05-09 PROCEDURE — 99205 OFFICE O/P NEW HI 60 MIN: CPT | Performed by: FAMILY MEDICINE

## 2023-05-09 PROCEDURE — 80048 BASIC METABOLIC PNL TOTAL CA: CPT

## 2023-05-09 PROCEDURE — G8399 PT W/DXA RESULTS DOCUMENT: HCPCS | Performed by: FAMILY MEDICINE

## 2023-05-09 PROCEDURE — 3077F SYST BP >= 140 MM HG: CPT | Performed by: FAMILY MEDICINE

## 2023-05-09 PROCEDURE — 1123F ACP DISCUSS/DSCN MKR DOCD: CPT | Performed by: FAMILY MEDICINE

## 2023-05-09 PROCEDURE — 36415 COLL VENOUS BLD VENIPUNCTURE: CPT

## 2023-05-09 RX ORDER — AMLODIPINE BESYLATE 5 MG/1
5 TABLET ORAL DAILY
Qty: 90 TABLET | Refills: 3 | Status: SHIPPED | OUTPATIENT
Start: 2023-05-09

## 2023-05-09 RX ORDER — BISOPROLOL FUMARATE 10 MG/1
10 TABLET, FILM COATED ORAL DAILY
Qty: 90 TABLET | Refills: 3 | Status: SHIPPED | OUTPATIENT
Start: 2023-05-09 | End: 2023-05-12 | Stop reason: SDUPTHER

## 2023-05-09 NOTE — PROGRESS NOTES
Pressure in her father; High Cholesterol in her father; Other in her mother; Stroke in her father; Thyroid Disease in her mother. Physical Examination:     BP (!) 147/73   Pulse 68   Resp 18   Ht 5' 6\" (1.676 m)   Wt 261 lb (118.4 kg)   LMP  (LMP Unknown)   SpO2 96%   BMI 42.13 kg/m²  Body mass index is 42.13 kg/m². Constitutional: She appeared oriented to person and place. She appears well-developed and well-nourished. In no acute distress. HEENT: Normocephalic and atraumatic. No JVD present. Carotid bruit is not present. No mass and no thyromegaly present. No lymphadenopathy noted. Cardiovascular: Normal rateNormal rate, regular rhythm, normal heart sounds. Exam reveals no gallop and no friction rubs. 2/6 systolic murmur, 5th intercostal space on the LEFT in the mid-clavicular line (cardiac apex)  Pulmonary/Chest: Effort normal and breath sounds normal. No respiratory distress. She has no wheezes, rhonchi or rales. Abdominal: Soft, non-tender. She exhibits no organomegaly, mass or bruit. Extremities: Trace at the ankles. No cyanosis or clubbing. 2+ radial and carotid pulses. Distal extremity pulses: 2+ bilaterally. Neurological: Alertness and orientation as per Constitutional exam. No evidence of gross cranial nerve deficit. Coordination appeared normal.   Skin: Skin is warm and dry. There is no rash or diaphoresis. Psychiatric: She has a normal mood and affect.  Her speech is normal and behavior is normal.      MOST RECENT LABS ON RECORD:   Lab Results   Component Value Date    WBC 6.9 04/04/2023    HGB 11.6 (L) 04/04/2023    HCT 35.0 (L) 04/04/2023     04/04/2023    CHOL 132 10/04/2022    TRIG 244 (H) 10/04/2022    HDL 46 10/04/2022    ALT 48 (H) 04/04/2023    AST 48 (H) 04/04/2023     04/04/2023    K 4.4 04/04/2023    CL 99 04/04/2023    CREATININE 1.07 (H) 04/04/2023    BUN 17 04/04/2023    CO2 24 04/04/2023    TSH 0.48 11/07/2012    INR 2.5 04/25/2023    GLUF 136 (H)

## 2023-05-09 NOTE — PATIENT INSTRUCTIONS
SURVEY:    You may be receiving a survey from [employer] regarding your visit today. Please complete the survey to enable us to provide the highest quality of care to you and your family. If you cannot score us a very good on any question, please call the office to discuss how we could have made your experience a very good one. Thank you.

## 2023-05-10 ENCOUNTER — TELEPHONE (OUTPATIENT)
Dept: CARDIOLOGY | Age: 73
End: 2023-05-10

## 2023-05-10 NOTE — TELEPHONE ENCOUNTER
----- Message from Antoinette Caruso MD sent at 5/10/2023 12:19 AM EDT -----  Let Ms. Castellanos know their test result was ok. Will discuss at next visit. Thanks.

## 2023-05-11 ENCOUNTER — HOSPITAL ENCOUNTER (OUTPATIENT)
Dept: CARDIAC CATH/INVASIVE PROCEDURES | Age: 73
Discharge: HOME | End: 2023-05-11
Attending: FAMILY MEDICINE | Admitting: FAMILY MEDICINE
Payer: MEDICARE

## 2023-05-11 VITALS
RESPIRATION RATE: 18 BRPM | HEART RATE: 65 BPM | TEMPERATURE: 98 F | DIASTOLIC BLOOD PRESSURE: 69 MMHG | OXYGEN SATURATION: 94 % | SYSTOLIC BLOOD PRESSURE: 160 MMHG

## 2023-05-11 LAB
GLUCOSE BLD-MCNC: 124 MG/DL (ref 74–100)
INR PPP: 1.8
PROTHROMBIN TIME: 20.9 SEC (ref 11.9–14.8)

## 2023-05-11 PROCEDURE — 36415 COLL VENOUS BLD VENIPUNCTURE: CPT

## 2023-05-11 PROCEDURE — 93458 L HRT ARTERY/VENTRICLE ANGIO: CPT | Performed by: FAMILY MEDICINE

## 2023-05-11 PROCEDURE — 99152 MOD SED SAME PHYS/QHP 5/>YRS: CPT

## 2023-05-11 PROCEDURE — 93458 L HRT ARTERY/VENTRICLE ANGIO: CPT

## 2023-05-11 PROCEDURE — 85610 PROTHROMBIN TIME: CPT

## 2023-05-11 PROCEDURE — 2500000003 HC RX 250 WO HCPCS

## 2023-05-11 PROCEDURE — C1894 INTRO/SHEATH, NON-LASER: HCPCS

## 2023-05-11 PROCEDURE — 2709999900 HC NON-CHARGEABLE SUPPLY

## 2023-05-11 PROCEDURE — 82947 ASSAY GLUCOSE BLOOD QUANT: CPT

## 2023-05-11 PROCEDURE — 6360000002 HC RX W HCPCS

## 2023-05-11 PROCEDURE — 6360000004 HC RX CONTRAST MEDICATION: Performed by: FAMILY MEDICINE

## 2023-05-11 PROCEDURE — C1769 GUIDE WIRE: HCPCS

## 2023-05-11 RX ORDER — ACETAMINOPHEN 325 MG/1
650 TABLET ORAL EVERY 4 HOURS PRN
Status: DISCONTINUED | OUTPATIENT
Start: 2023-05-11 | End: 2023-05-11 | Stop reason: HOSPADM

## 2023-05-11 RX ORDER — SODIUM CHLORIDE 0.9 % (FLUSH) 0.9 %
5-40 SYRINGE (ML) INJECTION PRN
Status: DISCONTINUED | OUTPATIENT
Start: 2023-05-11 | End: 2023-05-11 | Stop reason: HOSPADM

## 2023-05-11 RX ORDER — SODIUM CHLORIDE 9 MG/ML
INJECTION, SOLUTION INTRAVENOUS PRN
Status: DISCONTINUED | OUTPATIENT
Start: 2023-05-11 | End: 2023-05-11 | Stop reason: HOSPADM

## 2023-05-11 RX ORDER — NITROGLYCERIN 0.4 MG/1
0.4 TABLET SUBLINGUAL EVERY 5 MIN PRN
Status: DISCONTINUED | OUTPATIENT
Start: 2023-05-11 | End: 2023-05-11 | Stop reason: HOSPADM

## 2023-05-11 RX ORDER — SODIUM CHLORIDE 0.9 % (FLUSH) 0.9 %
5-40 SYRINGE (ML) INJECTION EVERY 12 HOURS SCHEDULED
Status: DISCONTINUED | OUTPATIENT
Start: 2023-05-11 | End: 2023-05-11 | Stop reason: HOSPADM

## 2023-05-11 RX ORDER — DIPHENHYDRAMINE HCL 25 MG
50 CAPSULE ORAL ONCE
Status: DISCONTINUED | OUTPATIENT
Start: 2023-05-11 | End: 2023-05-11 | Stop reason: HOSPADM

## 2023-05-11 RX ORDER — SODIUM CHLORIDE 9 MG/ML
INJECTION, SOLUTION INTRAVENOUS CONTINUOUS
Status: DISCONTINUED | OUTPATIENT
Start: 2023-05-11 | End: 2023-05-11 | Stop reason: HOSPADM

## 2023-05-11 RX ADMIN — IOPAMIDOL 65 ML: 755 INJECTION, SOLUTION INTRAVENOUS at 14:41

## 2023-05-11 NOTE — PROGRESS NOTES
IV Sedation Discharge Criteria    Inpatients must meet Criteria 1 through 7. All other patients are either YES or N/A. If a NO is chosen then Surgeon must be notified. 1.  Minimum 30 minutes after last dose of sedative medication, minimum 120 minutes after last dose of reversal agent. Yes      2. Systolic BP stable within 20 mmHg for 30 minutes & systolic BP between 90 & 387 or within 10 mmHg of baseline. Yes      3. Pulse between 60 and 100 or within 10 bpm of baseline. Yes      4. Spontaneous respiratory rate >/= 10 per minute. Yes      5. SaO2 >/= 95 or  >/= baseline. Yes      6. Able to cough and swallow or return to baseline function. Yes      7. Alert and oriented or return to baseline mental status. Yes      8. Demonstrates controlled, coordinated movements, ambulates with steady gait, or return to baseline activity function. Yes      9. Minimal or no pain or nausea, or at a level tolerable and acceptable to patient. Yes      10. Takes and retains oral fluids as allowed. N/A      11. Procedural / perioperative site stable. Minimal or no bleeding. Yes          12. If GI endoscopy procedure, minimal or no abdominal distention or passing flatus. N/A      13. Written discharge instructions and emergency telephone number provided. Yes      14. Accompanied by a responsible adult.     Yes

## 2023-05-11 NOTE — PROGRESS NOTES
Pt returned to post-procedural area, report received from [facility], dressing clean, dry, and intact

## 2023-05-11 NOTE — DISCHARGE INSTRUCTIONS
of infection- including fever and chills   Redness, swelling, increasing pain, feels warm to touch, red streak forming from site, or any discharge from the procedure site.    Call 911 If Any of the Following Occurs   Drooping facial muscles   Changes in vision or speech   Difficulty walking or using your limbs   Change in sensation, including numbness, feeling cold, or change in color   Extreme sweating, nausea or vomiting   Dizziness or lightheadedness   Chest pain   Rapid, irregular heartbeat   Palpitations   Cough, shortness of breath, or difficulty breathing   Weakness or fainting   If you think you have an emergency, CALL 911

## 2023-05-11 NOTE — OP NOTE
-  Coronary Angiography Brief Post Operative Note:    No significant coronary artery disease. Normal left ventricular end diastolic pressure (LVEDP). Continue standard risk factor modification as clinically indicated and consider alternative etiologies of the patients symptoms.      Electronically signed by Clara Harp MD on 5/11/2023 at 2:54 PM

## 2023-05-12 DIAGNOSIS — I25.10 MILD CAD: ICD-10-CM

## 2023-05-12 DIAGNOSIS — E78.2 MIXED HYPERLIPIDEMIA: ICD-10-CM

## 2023-05-12 DIAGNOSIS — R94.39 ABNORMAL STRESS TEST: ICD-10-CM

## 2023-05-12 DIAGNOSIS — I10 PRIMARY HYPERTENSION: ICD-10-CM

## 2023-05-12 RX ORDER — BISOPROLOL FUMARATE 10 MG/1
10 TABLET, FILM COATED ORAL DAILY
Qty: 90 TABLET | Refills: 3 | Status: SHIPPED | OUTPATIENT
Start: 2023-05-12

## 2023-05-16 ENCOUNTER — HOSPITAL ENCOUNTER (OUTPATIENT)
Dept: PHARMACY | Age: 73
Setting detail: THERAPIES SERIES
Discharge: HOME | End: 2023-05-16
Payer: MEDICARE

## 2023-05-16 VITALS
DIASTOLIC BLOOD PRESSURE: 66 MMHG | HEART RATE: 64 BPM | BODY MASS INDEX: 41.8 KG/M2 | WEIGHT: 259 LBS | SYSTOLIC BLOOD PRESSURE: 145 MMHG

## 2023-05-16 DIAGNOSIS — Z79.01 LONG TERM CURRENT USE OF ANTICOAGULANT THERAPY: Primary | Chronic | ICD-10-CM

## 2023-05-16 LAB — INR BLD: 1.7

## 2023-05-16 PROCEDURE — 85610 PROTHROMBIN TIME: CPT

## 2023-05-16 PROCEDURE — 99211 OFF/OP EST MAY X REQ PHY/QHP: CPT

## 2023-05-16 NOTE — PROGRESS NOTES
[facility]  Medication Management  ANTICOAGULATION    Referring Provider: Dr. Paramjit Billingsley INR: 2-3    TODAY'S INR: 1.7    WARFARIN Dosage: Warfarin 10 mg po today for 1 dose, then continue 7.5 mg po every Monday and Friday; 5 mg po all other days    INR (no units)   Date Value   05/16/2023 1.7   05/11/2023 1.8   04/25/2023 2.5   04/10/2023 1.7   04/07/2023 1   04/04/2023 1   03/27/2023 1.2       Hemoglobin   Date Value Ref Range Status   05/09/2023 11.5 (L) 11.9 - 15.1 g/dL Final     Hematocrit   Date Value Ref Range Status   05/09/2023 35.3 (L) 36.3 - 47.1 % Final     ALT   Date Value Ref Range Status   04/04/2023 48 (H) 5 - 33 U/L Final     AST   Date Value Ref Range Status   04/04/2023 48 (H) <32 U/L Final       Medication changes:  Stop metoprolol  Add bisoprolol  Add amlodipine    Notes:    Fingerstick INR drawn per clinic protocol. Patient states no visible blood in urine and no black tarry stool. No change in diet. Will recheck INR in 4 weeks. Patient acknowledges working in consult agreement with pharmacist as referred by his/her physician. Patient held warfarin 5/9/23 and 5/10/23 prior to heart cath 5/11/23. For Pharmacy Admin Tracking Only    Intervention Detail: Dose Adjustment: 1, reason: Therapy Optimization  Total # of Interventions Recommended: 1  Total # of Interventions Accepted: 1  Time Spent (min): [address]  [place], [facility]

## 2023-05-16 NOTE — PATIENT INSTRUCTIONS
Please take warfarin 10 mg today. Continue current dose of warfarin as instructed on dosing calendar provided. Continue to monitor urine and stool for signs and symptoms of bleeding. Please notify the clinic of any medication changes.

## 2023-05-17 DIAGNOSIS — E78.2 MIXED HYPERLIPIDEMIA: ICD-10-CM

## 2023-05-17 DIAGNOSIS — R94.39 ABNORMAL STRESS TEST: ICD-10-CM

## 2023-05-17 DIAGNOSIS — I25.10 MILD CAD: ICD-10-CM

## 2023-05-17 DIAGNOSIS — I10 PRIMARY HYPERTENSION: ICD-10-CM

## 2023-05-17 RX ORDER — AMLODIPINE BESYLATE 5 MG/1
5 TABLET ORAL DAILY
Qty: 90 TABLET | Refills: 3 | Status: SHIPPED | OUTPATIENT
Start: 2023-05-17

## 2023-05-30 ENCOUNTER — HOSPITAL ENCOUNTER (OUTPATIENT)
Age: 73
Discharge: HOME | End: 2023-05-30
Payer: MEDICARE

## 2023-05-30 DIAGNOSIS — E11.9 TYPE 2 DIABETES MELLITUS WITHOUT COMPLICATION, WITHOUT LONG-TERM CURRENT USE OF INSULIN (HCC): ICD-10-CM

## 2023-05-30 DIAGNOSIS — K62.5 RECTAL BLEEDING: ICD-10-CM

## 2023-05-30 LAB
ALBUMIN SERPL-MCNC: 4.5 G/DL (ref 3.5–5.2)
ALBUMIN/GLOB SERPL: 1.1 {RATIO} (ref 1–2.5)
ALP SERPL-CCNC: 57 U/L (ref 35–104)
ALT SERPL-CCNC: 38 U/L (ref 5–33)
ANION GAP SERPL CALCULATED.3IONS-SCNC: 15 MMOL/L (ref 9–17)
AST SERPL-CCNC: 39 U/L
BASOPHILS # BLD: 0.03 K/UL (ref 0–0.2)
BASOPHILS NFR BLD: 0 % (ref 0–2)
BILIRUB SERPL-MCNC: 0.6 MG/DL (ref 0.3–1.2)
BUN SERPL-MCNC: 24 MG/DL (ref 8–23)
BUN/CREAT SERPL: 24 (ref 9–20)
CALCIUM SERPL-MCNC: 10.4 MG/DL (ref 8.6–10.4)
CHLORIDE SERPL-SCNC: 102 MMOL/L (ref 98–107)
CO2 SERPL-SCNC: 23 MMOL/L (ref 20–31)
CREAT SERPL-MCNC: 1.02 MG/DL (ref 0.5–0.9)
CREAT UR-MCNC: 131.5 MG/DL (ref 28–217)
EOSINOPHIL # BLD: 0.24 K/UL (ref 0–0.44)
EOSINOPHILS RELATIVE PERCENT: 4 % (ref 1–4)
ERYTHROCYTE [DISTWIDTH] IN BLOOD BY AUTOMATED COUNT: 15.3 % (ref 11.8–14.4)
GFR SERPL CREATININE-BSD FRML MDRD: 58 ML/MIN/1.73M2
GLUCOSE SERPL-MCNC: 162 MG/DL (ref 70–99)
HCT VFR BLD AUTO: 35.9 % (ref 36.3–47.1)
HGB BLD-MCNC: 12 G/DL (ref 11.9–15.1)
IMM GRANULOCYTES # BLD AUTO: <0.03 K/UL (ref 0–0.3)
IMM GRANULOCYTES NFR BLD: 0 %
LYMPHOCYTES # BLD: 39 % (ref 24–43)
LYMPHOCYTES NFR BLD: 2.59 K/UL (ref 1.1–3.7)
MCH RBC QN AUTO: 30.6 PG (ref 25.2–33.5)
MCHC RBC AUTO-ENTMCNC: 33.4 G/DL (ref 28.4–34.8)
MCV RBC AUTO: 91.6 FL (ref 82.6–102.9)
MICROALBUMIN UR-MCNC: 12 MG/L
MICROALBUMIN/CREAT UR-RTO: 9 MCG/MG CREAT
MONOCYTES NFR BLD: 0.4 K/UL (ref 0.1–1.2)
MONOCYTES NFR BLD: 6 % (ref 3–12)
NEUTROPHILS NFR BLD: 51 % (ref 36–65)
NEUTS SEG NFR BLD: 3.39 K/UL (ref 1.5–8.1)
NRBC AUTOMATED: 0 PER 100 WBC
PLATELET # BLD AUTO: 260 K/UL (ref 138–453)
PMV BLD AUTO: 10.1 FL (ref 8.1–13.5)
POTASSIUM SERPL-SCNC: 4.3 MMOL/L (ref 3.7–5.3)
PROT SERPL-MCNC: 8.6 G/DL (ref 6.4–8.3)
RBC # BLD AUTO: 3.92 M/UL (ref 3.95–5.11)
SODIUM SERPL-SCNC: 140 MMOL/L (ref 135–144)
WBC OTHER # BLD: 6.7 K/UL (ref 3.5–11.3)

## 2023-05-30 PROCEDURE — 82570 ASSAY OF URINE CREATININE: CPT

## 2023-05-30 PROCEDURE — 82043 UR ALBUMIN QUANTITATIVE: CPT

## 2023-05-30 PROCEDURE — 83036 HEMOGLOBIN GLYCOSYLATED A1C: CPT

## 2023-05-30 PROCEDURE — 36415 COLL VENOUS BLD VENIPUNCTURE: CPT

## 2023-05-30 PROCEDURE — 80053 COMPREHEN METABOLIC PANEL: CPT

## 2023-05-30 PROCEDURE — 85025 COMPLETE CBC W/AUTO DIFF WBC: CPT

## 2023-05-31 LAB
EST. AVERAGE GLUCOSE BLD GHB EST-MCNC: 169 MG/DL
HBA1C MFR BLD: 7.5 % (ref 4–6)

## 2023-06-16 PROBLEM — K52.1 DIARRHEA DUE TO DRUG: Status: ACTIVE | Noted: 2023-06-16

## 2023-06-19 ENCOUNTER — HOSPITAL ENCOUNTER (OUTPATIENT)
Dept: NON INVASIVE DIAGNOSTICS | Age: 73
Discharge: HOME | End: 2023-06-19
Payer: MEDICARE

## 2023-06-19 DIAGNOSIS — I25.10 MILD CAD: ICD-10-CM

## 2023-06-19 DIAGNOSIS — I10 PRIMARY HYPERTENSION: ICD-10-CM

## 2023-06-19 DIAGNOSIS — E78.2 MIXED HYPERLIPIDEMIA: ICD-10-CM

## 2023-06-19 DIAGNOSIS — R94.39 ABNORMAL STRESS TEST: ICD-10-CM

## 2023-06-19 LAB
LV EF: 60 %
LVEF MODALITY: NORMAL

## 2023-06-19 PROCEDURE — 93306 TTE W/DOPPLER COMPLETE: CPT

## 2023-06-20 ENCOUNTER — TELEPHONE (OUTPATIENT)
Dept: CARDIOLOGY | Age: 73
End: 2023-06-20

## 2023-06-20 NOTE — TELEPHONE ENCOUNTER
----- Message from Cookie Nice MD sent at 6/19/2023 11:28 PM EDT -----  Let Ms. Castellanos know their test result was ok. Will discuss at next visit. Thanks.

## 2023-06-27 ENCOUNTER — HOSPITAL ENCOUNTER (OUTPATIENT)
Dept: PHARMACY | Age: 73
Setting detail: THERAPIES SERIES
Discharge: HOME | End: 2023-06-27
Payer: MEDICARE

## 2023-06-27 VITALS
DIASTOLIC BLOOD PRESSURE: 70 MMHG | WEIGHT: 257 LBS | SYSTOLIC BLOOD PRESSURE: 139 MMHG | HEART RATE: 56 BPM | BODY MASS INDEX: 41.48 KG/M2

## 2023-06-27 DIAGNOSIS — Z79.01 LONG TERM CURRENT USE OF ANTICOAGULANT THERAPY: Primary | Chronic | ICD-10-CM

## 2023-06-27 LAB — INR BLD: 2.9

## 2023-06-27 PROCEDURE — 85610 PROTHROMBIN TIME: CPT

## 2023-06-27 PROCEDURE — 99211 OFF/OP EST MAY X REQ PHY/QHP: CPT

## 2023-07-25 ENCOUNTER — HOSPITAL ENCOUNTER (OUTPATIENT)
Dept: PHARMACY | Age: 73
Setting detail: THERAPIES SERIES
Discharge: HOME | End: 2023-07-25
Payer: MEDICARE

## 2023-07-25 VITALS
WEIGHT: 259 LBS | DIASTOLIC BLOOD PRESSURE: 71 MMHG | SYSTOLIC BLOOD PRESSURE: 161 MMHG | HEART RATE: 63 BPM | BODY MASS INDEX: 41.8 KG/M2

## 2023-07-25 DIAGNOSIS — Z79.01 LONG TERM CURRENT USE OF ANTICOAGULANT THERAPY: Primary | Chronic | ICD-10-CM

## 2023-07-25 LAB — INR BLD: 2.9

## 2023-07-25 PROCEDURE — 85610 PROTHROMBIN TIME: CPT

## 2023-07-25 PROCEDURE — 99211 OFF/OP EST MAY X REQ PHY/QHP: CPT

## 2023-07-25 NOTE — PROGRESS NOTES
[address] [facility]  Medication Management  ANTICOAGULATION    Referring Provider: Dr. Denise Nowak INR: 2-3    TODAY'S INR: 2.9    WARFARIN Dosage: Continue warfarin 7.5 mg po every Friday; 5 mg po all other days    INR (no units)   Date Value   2023 2.9   2023 3.6   2023 1.7   2023 1.8   2023 2.5   04/10/2023 1.7   2023 1       Hemoglobin   Date Value Ref Range Status   2023 12.0 11.9 - 15.1 g/dL Final     Hematocrit   Date Value Ref Range Status   2023 35.9 (L) 36.3 - 47.1 % Final     ALT   Date Value Ref Range Status   2023 38 (H) 5 - 33 U/L Final     AST   Date Value Ref Range Status   2023 39 (H) <32 U/L Final       Medication changes:  No changes    Notes:    Fingerstick INR drawn per clinic protocol. Patient states no visible blood in urine and no black tarry stool. Denies any missed doses of warfarin. No change in other maintenance medications or in diet. Will recheck INR in 4 weeks. Patient acknowledges working in consult agreement with pharmacist as referred by his/her physician. For Pharmacy Admin Tracking Only    Intervention Detail: Adherence Monitorin  Total # of Interventions Recommended: 1  Total # of Interventions Accepted: 1  Time Spent (min): [address] [place].  JULY [facility]

## 2023-08-01 NOTE — PROGRESS NOTES
Dr Patrizia Fernández and Dr Kurt Donald at bedside.   V.O. to begin FFP and RBC at 999ml/h Initial SW/CM Assessment/Plan of Care Note     Baseline Assessment  69 year old admitted 7/31/2023 as Inpatient with a diagnosis of extremity edema, leg ulcers, following with wound clinic, was sent from the podiatry office to the hospital for admission and IV antibiotics-see  H&P for full details. Prior to admission patient was living with Alone and residing at House.  Patient does  have a Power of  for Healthcare.  Document is activated.  Agent is friend Sanam. Patient’s Primary Care Provider is Gaudencio Saucedo MD.     Progress Note  MSW meet with pt at bedside to complete intake assessment and to address concerns of self neglect and unsafe living enforcement/hoarding.    Pt reports that she lives alone and is independent with ADLs/IADLs, but has a cleaning lady to assist. She reports she drives herself to her appointments or her friend does. She reports not having any serives prior to dc as hhc ended. She reports she does not use mobility equipment at a baseline. Pt is adimit about returning home. She does not want LTC [facility] SNF but would be open to [facility] if needed.  When MSW addressed concerns for self neglect pt became upset and hernandez snot agree with allegation. When asked why sh was not caring for wounds as prescribed and using paper towels as supplies she did not awnser. She was not open to furthure questioning. Pt appeared dishelved and unkept. Pt smelld, had dirty hair (per MD not washed in 3 months), and had very long finger nails.    MD and podiatry MD both express concerns of self neglect of pt. Podiatrist also concerns about pt's POC's ability in helping the pt as well. Podiatrist reports that pt is seen every 3 weeks and wounds have not improved and continue to worsen and have hair in them. She reports talking at length with the POACH about LTC or 24/7 caregivers but [facility]rist reports this discussion has been going on for about 1 year.     Per chart review back to  2020 there have been multiple APS reports of self neglect (feaces on pt, maggots in wounds, disheveled), unsafe living situation/haording, and also report of over 20 cats in the home at one time and some found dead. There was also question of pt owning multiple homes, RN reports pt told her this admission she owns 1 home a has 3 cats.    It is unclear if APS is still following pt as in the pt was followed last admission. ( left for Radha [phone], now Ayden per  box & for Kandi PHAMMaria Guadalupe [phone]-previous APS case workers).     MSW made APS report of the above info ([phone]). Intake reports that case has 3-7 days to be taken and if taken  may reach out to MSW.     5:40 PM    Pt deemed incapacitated this admission by MD and Neurologist. Psych, PT/OT pending. IVABX not anticpated at this time. Wound care and podiatry remains following.     See notes for full detials. MSW activated SENIA Marion as SDM. MSW has not spoke with SENIA. MSW to follow up with GURPREET tomorrow.      Refer to PAMELLA/ROSSANA Flowsheet for objective data.     Medical History  Past Medical History:   Diagnosis Date   • Arthritis    • Blood clot associated with vein wall inflammation    • Deep vein blood clot of left lower extremity (CMD)    • Essential (primary) hypertension    • High cholesterol    • Pneumonia    • Pulmonary emboli (CMD)        Prior to Admission Status  Functional Status  Ambulation: Independent/Self  Dressing: Independent/Self  Toileting: Independent/Self  Meal Preparation: Independent/Self  Shopping: Independent/Self  Medication Preparation: Independent/Self  Medication Administration: Independent/Self  Housekeeping: Independent/Self, Homemaker  Laundry: Independent/Self  Transportation: Independent/Self      Insurance  Primary: [employer] MEDICARE ADVANTAGE  Secondary: N/A    Disposition Recommendations:  SW/CM recommendation for discharge: SNF

## 2023-08-22 ENCOUNTER — HOSPITAL ENCOUNTER (OUTPATIENT)
Dept: PHARMACY | Age: 73
Setting detail: THERAPIES SERIES
Discharge: HOME | End: 2023-08-22
Payer: MEDICARE

## 2023-08-22 VITALS
SYSTOLIC BLOOD PRESSURE: 159 MMHG | BODY MASS INDEX: 41.55 KG/M2 | HEART RATE: 60 BPM | DIASTOLIC BLOOD PRESSURE: 74 MMHG | WEIGHT: 257.4 LBS

## 2023-08-22 DIAGNOSIS — Z79.01 LONG TERM CURRENT USE OF ANTICOAGULANT THERAPY: Primary | Chronic | ICD-10-CM

## 2023-08-22 DIAGNOSIS — I82.409 DEEP VEIN THROMBOSIS (DVT) OF LOWER EXTREMITY, UNSPECIFIED CHRONICITY, UNSPECIFIED LATERALITY, UNSPECIFIED VEIN (HCC): Chronic | ICD-10-CM

## 2023-08-22 DIAGNOSIS — I26.99 PULMONARY EMBOLISM, UNSPECIFIED CHRONICITY, UNSPECIFIED PULMONARY EMBOLISM TYPE, UNSPECIFIED WHETHER ACUTE COR PULMONALE PRESENT (HCC): Chronic | ICD-10-CM

## 2023-08-22 LAB — INR BLD: 2.9

## 2023-08-22 PROCEDURE — 99211 OFF/OP EST MAY X REQ PHY/QHP: CPT | Performed by: FAMILY MEDICINE

## 2023-08-22 PROCEDURE — 85610 PROTHROMBIN TIME: CPT | Performed by: FAMILY MEDICINE

## 2023-08-22 NOTE — PATIENT INSTRUCTIONS
Continue 7.5mg (1 1/2 tablets) Fridays and 5mg (1 tablet) all other days. Continue to monitor urine and stool for signs and symptoms of bleeding. Please notify the clinic of any medication changes. Please remember to bring all medications (both prescription and OTC) to your next visit. Kindly notify the clinic if you are unable to make to your next appointment. Continue current dose of warfarin as instructed on dosing calendar provided.

## 2023-08-22 NOTE — PROGRESS NOTES
[address] [facility]  Medication Management  ANTICOAGULATION    Referring Provider: Dr Kacey Hendricks INR: 2.0-3.0    TODAY'S INR: 2.9    WARFARIN Dosage: continue 7.5mg F, 5mg all other days    INR (no units)   Date Value   2023 2.9   2023 2.9   2023 2.9   2023 3.6   2023 1.7   2023 1.8   2023 2.5       Hemoglobin   Date Value Ref Range Status   2023 12.0 11.9 - 15.1 g/dL Final     Hematocrit   Date Value Ref Range Status   2023 35.9 (L) 36.3 - 47.1 % Final     ALT   Date Value Ref Range Status   2023 38 (H) 5 - 33 U/L Final     AST   Date Value Ref Range Status   2023 39 (H) <32 U/L Final       Medication changes:  No change    Notes:    Fingerstick INR drawn per clinic protocol. Patient states no visible blood in urine and no black tarry stool. Denies any missed doses of warfarin. No change in other maintenance medications or in diet. Will recheck INR in 4 weeks. Patient acknowledges working in consult agreement with pharmacist as referred by his/her physician.                   For Pharmacy Admin Tracking Only    Intervention Detail: Adherence Monitorin  Total # of Interventions Recommended: 2  Total # of Interventions Accepted: 2  Time Spent (min): 20      Abbott Closs, R.Ph., 2023,12:13 PM

## 2023-09-19 ENCOUNTER — HOSPITAL ENCOUNTER (OUTPATIENT)
Dept: PHARMACY | Age: 73
Setting detail: THERAPIES SERIES
Discharge: HOME | End: 2023-09-19
Payer: MEDICARE

## 2023-09-19 VITALS
SYSTOLIC BLOOD PRESSURE: 145 MMHG | BODY MASS INDEX: 41 KG/M2 | HEART RATE: 63 BPM | WEIGHT: 254 LBS | DIASTOLIC BLOOD PRESSURE: 68 MMHG

## 2023-09-19 DIAGNOSIS — Z79.01 LONG TERM CURRENT USE OF ANTICOAGULANT THERAPY: Primary | Chronic | ICD-10-CM

## 2023-09-19 LAB — INR BLD: 3.3

## 2023-09-19 PROCEDURE — 85610 PROTHROMBIN TIME: CPT

## 2023-09-19 PROCEDURE — 99212 OFFICE O/P EST SF 10 MIN: CPT

## 2023-09-19 NOTE — PROGRESS NOTES
[address] [facility]  Medication Management  ANTICOAGULATION    Referring Provider: Dr Sheyla Chao INR: 2-3    TODAY'S INR: 3.3    WARFARIN Dosage: Hold x 1, then decrease to 5 mg po daily     INR (no units)   Date Value   2023 3.3   2023 2.9   2023 2.9   2023 2.9   2023 3.6   2023 1.7   2023 1.8       Hemoglobin   Date Value Ref Range Status   2023 12.0 11.9 - 15.1 g/dL Final     Hematocrit   Date Value Ref Range Status   2023 35.9 (L) 36.3 - 47.1 % Final     ALT   Date Value Ref Range Status   2023 38 (H) 5 - 33 U/L Final     AST   Date Value Ref Range Status   2023 39 (H) <32 U/L Final       Medication changes:  No change    Notes:    Fingerstick INR drawn per clinic protocol. Patient states no visible blood in urine and no black tarry stool. Denies any missed doses of warfarin. No change in other maintenance medications. Will recheck INR in 2 weeks. Patient acknowledges working in consult agreement with pharmacist as referred by his/her physician. Patient reports she is eating less - she has lost 3 pounds since last visit 4 weeks ago. Patient will decrease warfarin as above - starting week of 23. Patient will take 7.5 mg dose on 23 - due to holding 5 mg dose on 23. For Pharmacy Admin Tracking Only    Intervention Detail: Adherence Monitorin and Dose Adjustment: 2, reason: Therapy De-escalation, Therapy Optimization  Total # of Interventions Recommended: 3  Total # of Interventions Accepted: 3  Time Spent (min): [address]

## 2023-10-03 ENCOUNTER — HOSPITAL ENCOUNTER (OUTPATIENT)
Dept: PHARMACY | Age: 73
Setting detail: THERAPIES SERIES
Discharge: HOME | End: 2023-10-03
Payer: MEDICARE

## 2023-10-03 VITALS
WEIGHT: 255 LBS | BODY MASS INDEX: 41.16 KG/M2 | HEART RATE: 72 BPM | DIASTOLIC BLOOD PRESSURE: 71 MMHG | SYSTOLIC BLOOD PRESSURE: 131 MMHG

## 2023-10-03 DIAGNOSIS — I26.99 PULMONARY EMBOLISM, UNSPECIFIED CHRONICITY, UNSPECIFIED PULMONARY EMBOLISM TYPE, UNSPECIFIED WHETHER ACUTE COR PULMONALE PRESENT (HCC): Chronic | ICD-10-CM

## 2023-10-03 DIAGNOSIS — Z79.01 LONG TERM CURRENT USE OF ANTICOAGULANT THERAPY: Primary | Chronic | ICD-10-CM

## 2023-10-03 DIAGNOSIS — I82.409 DEEP VEIN THROMBOSIS (DVT) OF LOWER EXTREMITY, UNSPECIFIED CHRONICITY, UNSPECIFIED LATERALITY, UNSPECIFIED VEIN (HCC): Chronic | ICD-10-CM

## 2023-10-03 LAB — INR BLD: 2.7

## 2023-10-03 PROCEDURE — 99211 OFF/OP EST MAY X REQ PHY/QHP: CPT | Performed by: FAMILY MEDICINE

## 2023-10-03 PROCEDURE — 85610 PROTHROMBIN TIME: CPT | Performed by: FAMILY MEDICINE

## 2023-10-03 NOTE — PROGRESS NOTES
[address] [facility]  Medication Management  ANTICOAGULATION    Referring Provider: Dr Camilo Williamson INR: 2.0-3.0    TODAY'S INR: 2.7    WARFARIN Dosage: continue 7.5mg F, 5mg all other days    INR (no units)   Date Value   10/03/2023 2.7   2023 3.3   2023 2.9   2023 2.9   2023 2.9   2023 3.6   2023 1.7       Hemoglobin   Date Value Ref Range Status   2023 12.0 11.9 - 15.1 g/dL Final     Hematocrit   Date Value Ref Range Status   2023 35.9 (L) 36.3 - 47.1 % Final     ALT   Date Value Ref Range Status   2023 38 (H) 5 - 33 U/L Final     AST   Date Value Ref Range Status   2023 39 (H) <32 U/L Final       Medication changes:  No change    Notes:    Fingerstick INR drawn per clinic protocol. Patient states no visible blood in urine and no black tarry stool. Denies any missed doses of warfarin. No change in other maintenance medications or in diet. Will recheck INR in 4 weeks. Patient did not decrease dosing as previously instructed so will continue warfarin as above. Patient acknowledges working in consult agreement with pharmacist as referred by his/her physician.                   For Pharmacy Admin Tracking Only    Intervention Detail: Adherence Monitorin and Dose Adjustment: 1, reason: Therapy Optimization  Total # of Interventions Recommended: 3  Total # of Interventions Accepted: 3  Time Spent (min): 20      ELANA Esteves.Ph., 10/3/2023,1:58 PM

## 2023-10-03 NOTE — PATIENT INSTRUCTIONS
Continue warfarin 7.5mg (1 1/2 tablets) Friday and 5mg (1 tablet) all other days. Continue to monitor urine and stool for signs and symptoms of bleeding. Please notify the clinic of any medication changes. Please remember to bring all medications (both prescription and OTC) to your next visit. Kindly notify the clinic if you are unable to make to your next appointment. Continue current dose of warfarin as instructed on dosing calendar provided.

## 2023-10-17 ENCOUNTER — APPOINTMENT (OUTPATIENT)
Dept: PHARMACY | Age: 73
End: 2023-10-17
Payer: MEDICARE

## 2023-11-01 ENCOUNTER — HOSPITAL ENCOUNTER (OUTPATIENT)
Dept: WOMENS IMAGING | Age: 73
Discharge: HOME | End: 2023-11-03
Attending: INTERNAL MEDICINE
Payer: MEDICARE

## 2023-11-01 DIAGNOSIS — M81.0 OSTEOPOROSIS, UNSPECIFIED OSTEOPOROSIS TYPE, UNSPECIFIED PATHOLOGICAL FRACTURE PRESENCE: ICD-10-CM

## 2023-11-01 PROCEDURE — 77080 DXA BONE DENSITY AXIAL: CPT

## 2023-11-09 NOTE — PATIENT INSTRUCTIONS
REMOTE  DEVICE CHECK:   Bi Ventricular ICD-[place]  Indication:  Primary prevention in the setting of a persistent ischemic cardiomyopathy  Remote  device check shows NORMAL FUNCTION.  Adequate battery longevity.  Atrial, RV and LV leads have stable lead impedance trends and auto thresholds.  Presenting Rhythm:  AP-BiV paced    Battery Voltage/Longevity:  5.7-6.1 years  Percent Pacing:  >99% Atrially paced, 98% Bi V paced  Tachycardia Detections/Episodes:  No atrial or ventricular tachy detections or therapies   See device parameters/results in the media.    Remote connectivity verified.   Follow up:  Both Clinic and Remotes need to be scheduled.   Continue current dose of warfarin as instructed on dosing calendar provided. Hold warfarin as directed per surgeon prior to total knee replacement surgery on 9/12/18. Begin Lovenox injections on 9/8/18 as instructed per surgeon. Continue Lovenox and warfarin as instructed per surgeon and [facility]mad[facility] Clinic - as per calendar instructions. Continue to monitor urine and stool for signs and symptoms of bleeding. Please notify the clinic of any medication changes.

## 2023-11-14 ENCOUNTER — HOSPITAL ENCOUNTER (OUTPATIENT)
Dept: PHARMACY | Age: 73
Setting detail: THERAPIES SERIES
Discharge: HOME | End: 2023-11-14
Payer: MEDICARE

## 2023-11-14 ENCOUNTER — HOSPITAL ENCOUNTER (OUTPATIENT)
Age: 73
Discharge: HOME | End: 2023-11-14
Payer: MEDICARE

## 2023-11-14 VITALS — BODY MASS INDEX: 40.67 KG/M2 | WEIGHT: 252 LBS

## 2023-11-14 DIAGNOSIS — E11.9 TYPE 2 DIABETES MELLITUS WITHOUT COMPLICATION, WITHOUT LONG-TERM CURRENT USE OF INSULIN (HCC): Chronic | ICD-10-CM

## 2023-11-14 DIAGNOSIS — Z79.01 LONG TERM CURRENT USE OF ANTICOAGULANT THERAPY: Primary | Chronic | ICD-10-CM

## 2023-11-14 LAB — INR BLD: 3

## 2023-11-14 PROCEDURE — 85610 PROTHROMBIN TIME: CPT | Performed by: FAMILY MEDICINE

## 2023-11-14 PROCEDURE — 99211 OFF/OP EST MAY X REQ PHY/QHP: CPT | Performed by: FAMILY MEDICINE

## 2023-11-14 PROCEDURE — 36415 COLL VENOUS BLD VENIPUNCTURE: CPT

## 2023-11-14 PROCEDURE — 83036 HEMOGLOBIN GLYCOSYLATED A1C: CPT

## 2023-11-14 NOTE — PATIENT INSTRUCTIONS
Continue to take warfarin 7.5mg (1 1/2 tablet) Friday and 5mg (1 tablet) all other days. Continue to monitor urine and stool for signs and symptoms of bleeding. Please notify the clinic of any medication changes. Please remember to bring all medications (both prescription and OTC) to your next visit. Kindly notify the clinic if you are unable to make to your next appointment. Follow warfarin dosing instructions on dosing calendar provided.

## 2023-11-14 NOTE — PROGRESS NOTES
[facility]  [facility]  ANTICOAGULATION    Referring Provider: Dr Reyes Pipe INR: 2.0-3.0    TODAY'S INR: 3.0    WARFARIN Dosage: continue 7.5mg F 5mg all other days    INR (no units)   Date Value   2023 3   10/03/2023 2.7   2023 3.3   2023 2.9   2023 2.9   2023 2.9   2023 3.6       Hemoglobin   Date Value Ref Range Status   2023 12.0 11.9 - 15.1 g/dL Final     Hematocrit   Date Value Ref Range Status   2023 35.9 (L) 36.3 - 47.1 % Final     ALT   Date Value Ref Range Status   2023 38 (H) 5 - 33 U/L Final     AST   Date Value Ref Range Status   2023 39 (H) <32 U/L Final       Medication changes:  No change    Notes:    Fingerstick INR drawn per clinic protocol. Patient states no visible blood in urine and no black tarry stool. Denies any missed doses of warfarin. No change in other maintenance medications or in diet. Will recheck INR in 4 weeks. Patient acknowledges working in consult agreement with pharmacist as referred by his/her physician.                   For Pharmacy Admin Tracking Only    Intervention Detail: Adherence Monitorin  Total # of Interventions Recommended: 2  Total # of Interventions Accepted: 2  Time Spent (min): 20      Tejinder Chamberlain R.Ph., 2023,12:15 PM

## 2023-11-15 LAB
EST. AVERAGE GLUCOSE BLD GHB EST-MCNC: 171 MG/DL
HBA1C MFR BLD: 7.6 % (ref 4–6)

## 2023-12-12 ENCOUNTER — HOSPITAL ENCOUNTER (OUTPATIENT)
Dept: PHARMACY | Age: 73
Setting detail: THERAPIES SERIES
Discharge: HOME | End: 2023-12-12
Payer: MEDICARE

## 2023-12-12 VITALS
SYSTOLIC BLOOD PRESSURE: 161 MMHG | DIASTOLIC BLOOD PRESSURE: 75 MMHG | HEART RATE: 62 BPM | BODY MASS INDEX: 40.35 KG/M2 | WEIGHT: 250 LBS

## 2023-12-12 DIAGNOSIS — Z79.01 LONG TERM CURRENT USE OF ANTICOAGULANT THERAPY: Primary | Chronic | ICD-10-CM

## 2023-12-12 LAB — INR BLD: 2.8

## 2023-12-12 PROCEDURE — 85610 PROTHROMBIN TIME: CPT

## 2023-12-12 PROCEDURE — 99211 OFF/OP EST MAY X REQ PHY/QHP: CPT

## 2023-12-12 NOTE — PROGRESS NOTES
[facility]  [facility]  ANTICOAGULATION    Referring Provider: Dr Willis Hines INR: 2-3    TODAY'S INR: 2.8    WARFARIN Dosage: Continue 7.5 mg po every Friday; 5 mg all other days    INR (no units)   Date Value   2023 2.8   2023 3   10/03/2023 2.7   2023 3.3   2023 2.9   2023 2.9   2023 2.9       Hemoglobin   Date Value Ref Range Status   2023 12.0 11.9 - 15.1 g/dL Final     Hematocrit   Date Value Ref Range Status   2023 35.9 (L) 36.3 - 47.1 % Final     ALT   Date Value Ref Range Status   2023 38 (H) 5 - 33 U/L Final     AST   Date Value Ref Range Status   2023 39 (H) <32 U/L Final       Medication changes:  No change    Notes:    Fingerstick INR drawn per clinic protocol. Patient states no visible blood in urine and no black tarry stool. Denies any missed doses of warfarin. No change in other maintenance medications or in diet. Will recheck INR in 4 weeks. Patient acknowledges working in consult agreement with pharmacist as referred by his/her physician. For Pharmacy Admin Tracking Only    Intervention Detail: Adherence Monitorin  Total # of Interventions Recommended: 1  Total # of Interventions Accepted: 1  Time Spent (min): [address]

## 2024-01-02 ENCOUNTER — HOSPITAL ENCOUNTER (OUTPATIENT)
Dept: WOMENS IMAGING | Age: 74
Discharge: HOME | End: 2024-01-04
Payer: MEDICARE

## 2024-01-02 VITALS — BODY MASS INDEX: 40.18 KG/M2 | HEIGHT: 66 IN | WEIGHT: 250 LBS

## 2024-01-02 DIAGNOSIS — Z12.31 OTHER SCREENING MAMMOGRAM: ICD-10-CM

## 2024-01-02 PROCEDURE — 77063 BREAST TOMOSYNTHESIS BI: CPT

## 2024-01-09 ENCOUNTER — HOSPITAL ENCOUNTER (OUTPATIENT)
Dept: PHARMACY | Age: 74
Setting detail: THERAPIES SERIES
Discharge: HOME | End: 2024-01-09
Payer: MEDICARE

## 2024-01-09 VITALS
SYSTOLIC BLOOD PRESSURE: 134 MMHG | BODY MASS INDEX: 40.32 KG/M2 | HEART RATE: 64 BPM | WEIGHT: 249.8 LBS | DIASTOLIC BLOOD PRESSURE: 69 MMHG

## 2024-01-09 DIAGNOSIS — Z79.01 LONG TERM CURRENT USE OF ANTICOAGULANT THERAPY: Primary | Chronic | ICD-10-CM

## 2024-01-09 DIAGNOSIS — I82.409 DEEP VEIN THROMBOSIS (DVT) OF LOWER EXTREMITY, UNSPECIFIED CHRONICITY, UNSPECIFIED LATERALITY, UNSPECIFIED VEIN (HCC): Chronic | ICD-10-CM

## 2024-01-09 LAB — INR BLD: 3

## 2024-01-09 PROCEDURE — 85610 PROTHROMBIN TIME: CPT | Performed by: FAMILY MEDICINE

## 2024-01-09 PROCEDURE — 99211 OFF/OP EST MAY X REQ PHY/QHP: CPT | Performed by: FAMILY MEDICINE

## 2024-01-09 NOTE — PROGRESS NOTES
[facility]  [facility]  ANTICOAGULATION    Referring Provider: Dr Jenkins    GOAL INR: 2.0-3.0    TODAY'S INR: 3.0    WARFARIN Dosage: continue 7.5mg F, 5mg all other days    INR (no units)   Date Value   2024 3.0   2023 2.8   2023 3   10/03/2023 2.7   2023 3.3   2023 2.9   2023 2.9       Hemoglobin   Date Value Ref Range Status   2023 12.0 11.9 - 15.1 g/dL Final     Hematocrit   Date Value Ref Range Status   2023 35.9 (L) 36.3 - 47.1 % Final     ALT   Date Value Ref Range Status   2023 38 (H) 5 - 33 U/L Final     AST   Date Value Ref Range Status   2023 39 (H) <32 U/L Final       Medication changes:  No change    Notes:    Fingerstick INR drawn per clinic protocol. Patient states no visible blood in urine and no black tarry stool. Denies any missed doses of warfarin. No change in other maintenance medications or in diet. Will recheck INR in 4 weeks. Patient continues to have intermittent diarrhea, believed to be caused by metformin. Patient is to follow up with Dr Jenkins about metformin SE this week and may change to alternate medication.  Patient acknowledges working in consult agreement with pharmacist as referred by his/her physician.                  For Pharmacy Admin Tracking Only    Intervention Detail: Adherence Monitorin  Total # of Interventions Recommended: 2  Total # of Interventions Accepted: 2  Time Spent (min): 20      ELANA Wolf.Ph., 2024,11:15 AM

## 2024-01-09 NOTE — PATIENT INSTRUCTIONS
Continue to take warfarin 7.5mg (1 1/2 tablets) Friday and 5mg (1 tablet) all other days.  Continue to monitor urine and stool for signs and symptoms of bleeding.   Please notify the clinic of any medication changes.   Please remember to bring all medications (both prescription and OTC) to your next visit.   Kindly notify the clinic if you are unable to make to your next appointment.   Follow warfarin dosing instructions on dosing calendar provided.

## 2024-02-06 ENCOUNTER — HOSPITAL ENCOUNTER (OUTPATIENT)
Dept: PHARMACY | Age: 74
Setting detail: THERAPIES SERIES
Discharge: HOME | End: 2024-02-06
Payer: MEDICARE

## 2024-02-06 VITALS
SYSTOLIC BLOOD PRESSURE: 192 MMHG | WEIGHT: 253 LBS | HEART RATE: 57 BPM | DIASTOLIC BLOOD PRESSURE: 77 MMHG | BODY MASS INDEX: 40.84 KG/M2

## 2024-02-06 DIAGNOSIS — Z79.01 LONG TERM CURRENT USE OF ANTICOAGULANT THERAPY: Primary | Chronic | ICD-10-CM

## 2024-02-06 LAB — INR BLD: 3.3

## 2024-02-06 PROCEDURE — 99212 OFFICE O/P EST SF 10 MIN: CPT

## 2024-02-06 PROCEDURE — 85610 PROTHROMBIN TIME: CPT

## 2024-02-06 NOTE — PATIENT INSTRUCTIONS
Please hold warfarin today.   Continue current dose of warfarin as instructed on dosing calendar provided.   Continue to monitor urine and stool for signs and symptoms of bleeding.   Please notify the clinic of any medication changes.   Please remember to bring all medications (both prescription and OTC) to your next visit. Kindly notify the clinic if you are unable to make to your next appointment.

## 2024-02-06 NOTE — PROGRESS NOTES
[facility]  Medication [facility]  ANTICOAGULATION    Referring Provider: Dr Jenkins    GOAL INR: 2-3    TODAY'S INR: 3.3    WARFARIN Dosage: Hold x 1, then continue 7.5 mg po every Friday; 5 mg po all other days    INR (no units)   Date Value   2024 3.3   2024 3.0   2023 2.8   2023 3   10/03/2023 2.7   2023 3.3   2023 2.9       Hemoglobin   Date Value Ref Range Status   2023 12.0 11.9 - 15.1 g/dL Final     Hematocrit   Date Value Ref Range Status   2023 35.9 (L) 36.3 - 47.1 % Final     ALT   Date Value Ref Range Status   2023 38 (H) 5 - 33 U/L Final     AST   Date Value Ref Range Status   2023 39 (H) <32 U/L Final       Medication changes:  Increase glimepiride to 2 tablets po BID    Notes:    Fingerstick INR drawn per clinic protocol. Patient states no visible blood in urine and no black tarry stool. Denies any missed doses of warfarin. Will recheck INR in 4 weeks. Patient acknowledges working in consult agreement with pharmacist as referred by his/her physician.                Patient reports that she has been eating fewer salads.  Metformin decreased in December due to diarrhea.  Metformin may decrease INR.  Glimepiride increased 24.  Glimepiride may increase INR.    Today's supra-therapeutic INR may be related to recent glimepiride increase and/or metformin decrease.       For Pharmacy Admin Tracking Only    Intervention Detail: Adherence Monitorin and Dose Adjustment: 1, reason: Improve Adherence, Therapy De-escalation, Therapy Optimization  Total # of Interventions Recommended: 2  Total # of Interventions Accepted: 2  Time Spent (min): 20     Sussy Herrmann RPh

## 2024-03-05 ENCOUNTER — HOSPITAL ENCOUNTER (OUTPATIENT)
Dept: PHARMACY | Age: 74
Setting detail: THERAPIES SERIES
Discharge: HOME | End: 2024-03-05
Payer: MEDICARE

## 2024-03-05 VITALS — SYSTOLIC BLOOD PRESSURE: 193 MMHG | DIASTOLIC BLOOD PRESSURE: 89 MMHG | BODY MASS INDEX: 40.67 KG/M2 | WEIGHT: 252 LBS

## 2024-03-05 DIAGNOSIS — Z79.01 LONG TERM CURRENT USE OF ANTICOAGULANT THERAPY: Primary | Chronic | ICD-10-CM

## 2024-03-05 LAB — INR BLD: 3.2

## 2024-03-05 PROCEDURE — 99212 OFFICE O/P EST SF 10 MIN: CPT

## 2024-03-05 PROCEDURE — 85610 PROTHROMBIN TIME: CPT

## 2024-03-05 NOTE — PATIENT INSTRUCTIONS
Please hold warfarin today.   Decrease current dose of warfarin as instructed on dosing calendar provided.   Continue to monitor urine and stool for signs and symptoms of bleeding.   Please notify the clinic of any medication changes.   Please remember to bring all medications (both prescription and OTC) to your next visit. Kindly notify the clinic if you are unable to make to your next appointment.

## 2024-03-05 NOTE — PROGRESS NOTES
[facility]  [facility]  ANTICOAGULATION    Referring Provider: Dr Jenkins    GOAL INR: 2-3    TODAY'S INR: 3.2    WARFARIN Dosage: Hold x 1, then decrease warfarin to 5 mg po daily      INR (no units)   Date Value   2024 3.2   2024 3.3   2024 3.0   2023 2.8   2023 3   10/03/2023 2.7   2023 3.3       Hemoglobin   Date Value Ref Range Status   2023 12.0 11.9 - 15.1 g/dL Final     Hematocrit   Date Value Ref Range Status   2023 35.9 (L) 36.3 - 47.1 % Final     ALT   Date Value Ref Range Status   2023 38 (H) 5 - 33 U/L Final     AST   Date Value Ref Range Status   2023 39 (H) <32 U/L Final       Medication changes:  Add Ozempic 0.25 mg weekly    Notes:    Fingerstick INR drawn per clinic protocol. Patient states no visible blood in urine and no black tarry stool. Denies any missed doses of warfarin. Will recheck INR in 2 weeks. Patient acknowledges working in consult agreement with pharmacist as referred by his/her physician.                    For Pharmacy Admin Tracking Only    Intervention Detail: Adherence Monitorin and Dose Adjustment: 2, reason: Improve Adherence, Therapy De-escalation, Therapy Optimization  Total # of Interventions Recommended: 3  Total # of Interventions Accepted: 3  Time Spent (min): 20     Sussy Herrmann RPh

## 2024-03-19 ENCOUNTER — APPOINTMENT (OUTPATIENT)
Dept: PHARMACY | Age: 74
End: 2024-03-19
Payer: MEDICARE

## 2024-03-19 ENCOUNTER — HOSPITAL ENCOUNTER (OUTPATIENT)
Age: 74
Discharge: HOME | End: 2024-03-19
Payer: MEDICARE

## 2024-03-19 VITALS
HEART RATE: 63 BPM | BODY MASS INDEX: 39.96 KG/M2 | DIASTOLIC BLOOD PRESSURE: 82 MMHG | WEIGHT: 247.6 LBS | SYSTOLIC BLOOD PRESSURE: 156 MMHG

## 2024-03-19 DIAGNOSIS — E11.9 TYPE 2 DIABETES MELLITUS WITHOUT COMPLICATION, WITHOUT LONG-TERM CURRENT USE OF INSULIN (HCC): Chronic | ICD-10-CM

## 2024-03-19 DIAGNOSIS — Z79.01 LONG TERM CURRENT USE OF ANTICOAGULANT THERAPY: Primary | Chronic | ICD-10-CM

## 2024-03-19 DIAGNOSIS — I82.409 DEEP VEIN THROMBOSIS (DVT) OF LOWER EXTREMITY, UNSPECIFIED CHRONICITY, UNSPECIFIED LATERALITY, UNSPECIFIED VEIN (HCC): Chronic | ICD-10-CM

## 2024-03-19 LAB
EST. AVERAGE GLUCOSE BLD GHB EST-MCNC: 220 MG/DL
HBA1C MFR BLD: 9.3 % (ref 4–6)
INR BLD: 3.5

## 2024-03-19 PROCEDURE — 83036 HEMOGLOBIN GLYCOSYLATED A1C: CPT

## 2024-03-19 PROCEDURE — 99212 OFFICE O/P EST SF 10 MIN: CPT | Performed by: FAMILY MEDICINE

## 2024-03-19 PROCEDURE — 36415 COLL VENOUS BLD VENIPUNCTURE: CPT

## 2024-03-19 PROCEDURE — 85610 PROTHROMBIN TIME: CPT | Performed by: FAMILY MEDICINE

## 2024-03-19 NOTE — PATIENT INSTRUCTIONS
Please do not take warfarin today then decrease your dosing to take 1/2 tablet (2.5mg) warfarin Friday and 1 tablet (5mg) all other days.  Continue to monitor urine and stool for signs and symptoms of bleeding.   Please notify the clinic of any medication changes.   Please remember to bring all medications (both prescription and OTC) to your next visit.   Kindly notify the clinic if you are unable to make to your next appointment.   Follow warfarin dosing instructions on dosing calendar provided.

## 2024-03-19 NOTE — PROGRESS NOTES
[facility]  [facility]  ANTICOAGULATION    Referring Provider: Dr Jenkins    GOAL INR: 2.0-3.0    TODAY'S INR: 3.5    WARFARIN Dosage: hold x1 then decreased to 2.5mg F, 5mg all other days    INR (no units)   Date Value   2024 3.5   2024 3.2   2024 3.3   2024 3.0   2023 2.8   2023 3   10/03/2023 2.7       Hemoglobin   Date Value Ref Range Status   2023 12.0 11.9 - 15.1 g/dL Final     Hematocrit   Date Value Ref Range Status   2023 35.9 (L) 36.3 - 47.1 % Final     ALT   Date Value Ref Range Status   2023 38 (H) 5 - 33 U/L Final     AST   Date Value Ref Range Status   2023 39 (H) <32 U/L Final       Medication changes:  Stopped taking fish oil several weeks ago    Notes:    Fingerstick INR drawn per clinic protocol. Patient states no visible blood in urine and no black tarry stool. Denies any missed doses of warfarin. No change in other maintenance medications or in diet. Will recheck INR in 2 weeks. Patient continues Ozempic.  Patient states she is dog sitting and is not eating normal diet this week.  Eating fewer greens.  Patient will hold warfarin today then decrease weekly dosage as above.  Patient acknowledges working in consult agreement with pharmacist as referred by his/her physician.                  For Pharmacy Admin Tracking Only    Intervention Detail: Adherence Monitorin and Dose Adjustment: 2, reason: Therapy De-escalation  Total # of Interventions Recommended: 4  Total # of Interventions Accepted: 4  Time Spent (min): 30      ELANA Wolf.Ph., 3/19/2024,12:23 PM

## 2024-04-02 ENCOUNTER — HOSPITAL ENCOUNTER (OUTPATIENT)
Dept: PHARMACY | Age: 74
Setting detail: THERAPIES SERIES
Discharge: HOME | End: 2024-04-02
Payer: MEDICARE

## 2024-04-02 VITALS
SYSTOLIC BLOOD PRESSURE: 183 MMHG | BODY MASS INDEX: 40.03 KG/M2 | WEIGHT: 248 LBS | DIASTOLIC BLOOD PRESSURE: 74 MMHG | HEART RATE: 57 BPM

## 2024-04-02 DIAGNOSIS — Z79.01 LONG TERM CURRENT USE OF ANTICOAGULANT THERAPY: Primary | Chronic | ICD-10-CM

## 2024-04-02 LAB — INR BLD: 2.4

## 2024-04-02 PROCEDURE — 85610 PROTHROMBIN TIME: CPT

## 2024-04-02 PROCEDURE — 99211 OFF/OP EST MAY X REQ PHY/QHP: CPT

## 2024-04-02 NOTE — PROGRESS NOTES
[facility]  [facility]  ANTICO[facility]ULATION    Referring Provider: Dr Jenkins    GOAL INR: 2 - 3    TODAY'S INR: 2.4    WARFARIN Dosage: Continue 2.5 mg po every Friday; 5 mg po all other days    INR (no units)   Date Value   2024 2.4   2024 3.5   2024 3.2   2024 3.3   2024 3.0   2023 2.8   2023 3       Hemoglobin   Date Value Ref Range Status   2023 12.0 11.9 - 15.1 g/dL Final     Hematocrit   Date Value Ref Range Status   2023 35.9 (L) 36.3 - 47.1 % Final     ALT   Date Value Ref Range Status   2023 38 (H) 5 - 33 U/L Final     AST   Date Value Ref Range Status   2023 39 (H) <32 U/L Final       Medication changes:  Increase Ozempic to 0.5 mg weekly    Notes:    Fingerstick INR drawn per clinic protocol. Patient states no visible blood in urine and no black tarry stool. Denies any missed doses of warfarin. No change in diet. Will recheck INR in 4 weeks.   Patient acknowledges working in consult agreement with pharmacist as referred by his/her physician.                  For Pharmacy Admin Tracking Only    Intervention Detail: Adherence Monitorin  Total # of Interventions Recommended: 1  Total # of Interventions Accepted: 1  Time Spent (min): 20    Sussy Herrmann [facility]

## 2024-04-12 NOTE — PROGRESS NOTES
[facility], [facility]  [address] [facility] [address]  Dept: [phone]  MD Susie Burden 70 y.o. female     Patient presents with a chief complaint of New Patient (Thyroid Nodules)       /84   Pulse 64   Resp 18   Ht 5' 6\" (1.676 m)   Wt 268 lb (121.6 kg)   LMP  (LMP Unknown)   SpO2 96%   BMI 43.26 kg/m²       History of Presenting Illness: The patient/caregiver reports a history of complaint with the following features: Onset: noted on recent exam  Timing: first noted at wellness exam  Duration: unknown  Quality: no hoarseness, no dysphagia, no palpable nodules  Location: low anterior neck  Severity: pain none  Risk factors: family of thyroid disease, grandmother with thyroid cancer  Alleviating factors: nothing gives relief  Aggravating factors: nothing makes it worse  Associated factors: no heat or cold intolerance    Review of systems covering 10 systems is reviewed as staff entry in other note and pertinent positives and negatives noted. Past Medical History:   Diagnosis Date    Abnormal cardiovascular stress test 07/05/2012    a small but moderate intensity anteroapical defect was seen suggestive of reversible ischemia. Normal EF. 2 treadmill score of 3. Mild to moderate risk study.  Abnormal resting ECG findings 06/15/2012    normal sinus rhythm at 69 beats per minute with ST and T wave abnormalities in the anterior leads consider anterior ischemia.  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     Depression     Diabetes mellitus ([address]     Hyperlipidemia     Hypertension     Microalbuminuria     Phlebitis and thrombophlebitis of femoral vein (deep) (superficial) (HCC)     Sleep apnea non compliant with cpap    Supraventricular premature beats        Current Outpatient Medications:     atorvastatin (LIPITOR) 40 MG tablet, TAKE 1 TABLET ONCE DAILY, Disp: 90 tablet, Rfl: 1    glimepiride (AMARYL) 1 MG tablet, TAKE 1 TABLET EVERY MORNINGBEFORE BREAKFAST, Disp: 90 tablet, Rfl: 1    metFORMIN (GLUCOPHAGE) 1000 MG tablet, Take 1 tablet by mouth 2 times daily (with meals), Disp: 180 tablet, Rfl: 1    metoprolol (LOPRESSOR) 100 MG tablet, TAKE 1 TABLET TWICE A DAY, Disp: 180 tablet, Rfl: 1    omeprazole (PRILOSEC) 20 MG delayed release capsule, TAKE 1 CAPSULE ONCE DAILY, Disp: 90 capsule, Rfl: 1    sertraline (ZOLOFT) 50 MG tablet, TAKE 1 TABLET ONCE DAILY, Disp: 90 tablet, Rfl: 1    triamterene-hydroCHLOROthiazide (DYAZIDE) 37.5-25 MG per capsule, TAKE 1 CAPSULE EVERY       MORNING, Disp: 90 capsule, Rfl: 1    JANTOVEN 5 MG tablet, TAKE 1 TABLET DAILY        ([facility]), Disp: 90 tablet, Rfl: 1    losartan (COZAAR) 100 MG tablet, TAKE 1 TABLET ONCE DAILY, Disp: 90 tablet, Rfl: 1    Handicap Placard MISC, by Does not apply route EXPIRATION DATE: 3 YEARS, Disp: 1 each, Rfl: 0    Omega-3 Fatty Acids (FISH OIL) 1000 MG CPDR, Take 1,000 mg by mouth daily , Disp: , Rfl:     aspirin 325 MG EC tablet, Take 325 mg by mouth daily, Disp: , Rfl:     MICROLET LANCETS MISC, 1 each by Does not apply route daily Dx--E11.9 Non-Insulin dep, Disp: 100 each, Rfl: 3    blood glucose monitor strips, Use 1 QD  Dx-E11.9 Non-Insulin dep, Disp: 100 strip, Rfl: 3    Multiple Vitamin (MULTIVITAMIN PO), Take 1 tablet by mouth daily. , Disp: , Rfl:     calcium-vitamin D (OSCAL-500) 500-200 MG-UNIT per tablet, Take 1 tablet by mouth daily. , Disp: , Rfl:    No Known Allergies   Past Surgical History:   Procedure Laterality Date    APPENDECTOMY  8/91    CARDIAC CATHETERIZATION  8/9/12    LMCA:Mild irregularities. LAD:Mild irregularities. LCx:Mild irregularities.   RCA:Mild irregularities    CHOLECYSTECTOMY, LAPAROSCOPIC  2003    COLONOSCOPY  2014    FOOT SURGERY  2008, 2012    Lesion removal from left foot    HYSTERECTOMY      VENA CAVA FILTER PLACEMENT  2012    Venous embolism & thrombosis of deep lower extremity      Social History     Socioeconomic History    Marital status: Single     Spouse name: Not on file    Number of children: Not on file    Years of education: Not on file    Highest education level: Not on file   Occupational History    Not on file   Tobacco Use    Smoking status: Former Smoker     Packs/day: 3.00     Years: 10.00     Pack years: 30.00     Types: Cigarettes     Quit date: 1980     Years since quittin.8    Smokeless tobacco: Never Used   Substance and Sexual Activity    Alcohol use: No    Drug use: Not on file    Sexual activity: Not on file   Other Topics Concern    Not on file   Social History Narrative    Not on file     Social Determinants of Health     Financial Resource Strain: Low Risk     Difficulty of Paying Living Expenses: Not hard at all   Food Insecurity: No Food Insecurity    Worried About [address] [employer] in the Last Year: Never true    [address] [employer] in the Last Year: Never true   Transportation Needs: No Transportation Needs    Lack of Transportation (Medical): No    Lack of Transportation (Non-Medical): No   Physical Activity: Insufficiently Active    Days of Exercise per Week: 1 day    Minutes of Exercise per Session: 20 min   Stress: No Stress Concern Present    Feeling of Stress : Only a little   Social Connections: Moderately Isolated    Frequency of Communication with Friends and Family: More than three times a week    Frequency of Social Gatherings with Friends and Family: More than three times a week    Attends Roman [employer] Services: Never    Active Member of Clubs or Organizations:  Yes    Attends Club or Organization Meetings: Never    Marital Status: Never    Intimate Partner Violence: Not At Risk    Fear of Current or Ex-Partner: No    Emotionally Abused: No    Physically Abused: No    Sexually Abused: No   Housing Stability:     Unable to Pay for Housing in the Last Year: Not on file    Number of Places Lived in the Last Year: Not on file    Unstable Housing in the Last Year: Not on file     Family History   Problem Relation Age of Onset    Glaucoma Mother     Other Mother         epilepsy    Hearing Loss Mother     Thyroid Disease Mother     Heart Attack Father     High Blood Pressure Father     High Cholesterol Father     Stroke Father         PHYSICAL EXAM:    The patient was examined today 5/11/2022 with findings as follows:    CONSTITUTIONAL:    General Appearance: well-appearing, nontoxic, alert, no acute distress     Communication: understanding at normal conversational tones, normal voicing, speech intelligible    HEAD/FACE:    Head: atraumatic, normocephalic, no lesions    Facial Inspection: no lesions, healthy skin    Facial Strength: motor strength normal, symmetric strength, symmetric movement, motor strength    Sinuses: no sinus tenderness    Salivary Glands: no enlargements of parotid glands, no tenderness of parotid glands, no masses of parotid glands, clear salivary flow on palpation from Stensen's ducts, no duct stones of Stensen's duct, no enlargement of submandibular glands, no tenderness of submandibular glands, no masses of submandibular glands, clear salivary flow from Thalia's ducts, no stones of Clermont's ducts    Temporomandibular Joint: no crepitus with motion, no tenderness on palpation, no trismus, motion symmetric    EYES:    Pupils: PERRLA, extra-ocular movements intact, no nystagmus, sclera white, no redness of eyes, no watering of eyes    EARS:    Bilateral External Ears: no pits, no tags    Right External Ear: normally formed, no lesions, no mastoid tenderness    Left External Ear: normally formed, no lesions, no mastoid tenderness    Right External Auditory Canal: normal, healthy skin, no obstructing cerumen, no discharge    Left External Auditory Canal: normal, healthy skin, no obstructing cerumen, no discharge    Right Tympanic Membrane: normal landmarks, translucent, mobile to pneumatic otoscopy, no perforation    Left Tympanic Membrane: normal landmarks, translucent, mobile to pneumatic otoscopy, no perforation    Hearing: intact to spoken voice, intact to finger rub, Ashraf midline, Right Ear: Rinne AC>BC, Left Left Ear: Rinne AC>BC    NOSE:    Nasal Skin: no lesions, no lacerations, no scars    Nasal Dorsum: symmetric with no visible or palpable deformities    Nasal Tip: normal symmetric nasal tip, normal nasal valves    Nasal Mucosa: normal, pink and moist    Septum: not markedly deformed, midline, no exposed vessels, no bleeding, no septal granuloma    Turbinates: normal size and conformation    Nasopharynx: normal    ORAL CAVITY/MOUTH:    Lips, teeth, gums: normal lips, normal gums, dentition intact, no dental pain on palpation    Oral Mucosa: normal, moist, no lesions    Palate: normal hard palate, normal soft palate, symmetric palatal elevation    Floor of Mouth: normal floor of mouth    Tongue: normal tongue, no lesions, no edema, no masses, normal mucosa, mobile    Tonsils: normal tonsils, symmetric, no lesions    Posterior pharynx: normal    NECK:    Neck: no masses, trachea midline, normal range of motion, no cysts or pits, no tenderness to palpation    Thyroid: diffuse enlargement, no tenderness, bilateral nodules    LYMPH NODES:    Cervical: no palpable lymph node enlargement    RESPIRATORY:    Inspection/Auscultation: good air movement, chest expands symmetrically, normal breath sounds, no wheezing, no stridor    CARDIOVASCULAR SYSTEM:    Auscultation: regular rate and rhythm, carotid pulse normal, no carotid thrills, no carotid bruits    Observation/Palpation of Peripheral Vascular System: no varicosities, no cyanosis, no edema    SKIN:    General Appearance: no lesions, warm and dry, normal turgor, no bruising    NEUROLOGICAL SYSTEM:    Orientation: oriented to time, oriented to place, oriented to person    Cranial Nerves: Cranial Nerves II-XII intact, normal facial movement    PSYCHIATRIC:    Mood and affect: normal mood, normal affect          Assessment and Plan:    She has several thyroid nodules with the right inferior nodule identified as in need of biopsy per her recent ultrasound. Given the location, ultrasound guided biopsy is advised. The patient and/or caregiver is advised on the management of the thyroid nodule. The rationale for lab testing and imaging with ultrasound or other means is discussed. We have discussed that nodules less than 1 cm are generally managed with observation. In nodules greater that 1 cm, but less than 4 cm, fine needle aspiration biopsy or serial ultrasound is warranted. Lesions over four centimeters should be biopsied or removed due to the increased risk of malignancy. We have discussed that rapid growth, associated voice changes, male gender, age less than 21 years and over 36years of age, history of radiation exposure, and family history may be related to increased risk of malignancy. We have discussed that nodules suspicious for or with proven malignancy, or those causing pain, breathing or swallowing difficulties, or secreting excess thyroid hormone may benefit from surgical treatment. The patient and/or caregiver is able to state an understanding of these recommendations and is agreeable to the treatment plan. Diagnosis Orders   1. Multinodular goiter  US BIOPSY THYROID   2. Long term current use of anticoagulant therapy        Return in about 1 month (around 6/11/2022). The patient and/or caregiver is to notify the office if no improvement or worsening of symptoms is noted prior to the scheduled follow-up for sooner evaluation.  The patient and/or caregiver is able to state an understanding of these recommendations and is agreeable to the treatment plan. --Arturo Jarvis MD on 5/11/2022 at 2:14 PM    An electronic signature was used to authenticate this note. Oriented - self; Oriented - place; Oriented - time

## 2024-04-30 ENCOUNTER — APPOINTMENT (OUTPATIENT)
Dept: PHARMACY | Age: 74
End: 2024-04-30
Payer: MEDICARE

## 2024-04-30 VITALS
DIASTOLIC BLOOD PRESSURE: 63 MMHG | BODY MASS INDEX: 40.03 KG/M2 | WEIGHT: 248 LBS | HEART RATE: 70 BPM | SYSTOLIC BLOOD PRESSURE: 134 MMHG

## 2024-04-30 DIAGNOSIS — Z79.01 LONG TERM CURRENT USE OF ANTICOAGULANT THERAPY: Primary | Chronic | ICD-10-CM

## 2024-04-30 LAB — INR BLD: 2

## 2024-04-30 PROCEDURE — 85610 PROTHROMBIN TIME: CPT

## 2024-04-30 PROCEDURE — 99211 OFF/OP EST MAY X REQ PHY/QHP: CPT

## 2024-04-30 NOTE — PROGRESS NOTES
[facility]  [facility]  ANTICOAGULATION    Referring Provider: Dr Jenkins    GOAL INR: 2 - 3    TODAY'S INR: 2.4    WARFARIN Dosage: Continue 2.5 mg po every Friday; 5 mg po all other days    INR (no units)   Date Value   2024 2   2024 2.4   2024 3.5   2024 3.2   2024 3.3   2024 3.0   2023 2.8       Hemoglobin   Date Value Ref Range Status   2023 12.0 11.9 - 15.1 g/dL Final     Hematocrit   Date Value Ref Range Status   2023 35.9 (L) 36.3 - 47.1 % Final     ALT   Date Value Ref Range Status   2023 38 (H) 5 - 33 U/L Final     AST   Date Value Ref Range Status   2023 39 (H) <32 U/L Final       Medication changes:  No changes    Notes:    Fingerstick INR drawn per clinic protocol. Patient states no visible blood in urine and no black tarry stool. Denies any missed doses of warfarin. No change in diet. Will recheck INR in 4 weeks.   Patient acknowledges working in consult agreement with pharmacist as referred by his/her physician.                  For Pharmacy Admin Tracking Only    Intervention Detail: Adherence Monitorin  Total # of Interventions Recommended: 1  Total # of Interventions Accepted: 1  Time Spent (min): 20     Sussy Herrmann RPh

## 2024-05-28 ENCOUNTER — HOSPITAL ENCOUNTER (OUTPATIENT)
Dept: PHARMACY | Age: 74
Setting detail: THERAPIES SERIES
Discharge: HOME | End: 2024-05-28
Payer: MEDICARE

## 2024-05-28 VITALS
SYSTOLIC BLOOD PRESSURE: 128 MMHG | HEART RATE: 68 BPM | BODY MASS INDEX: 40.13 KG/M2 | WEIGHT: 248.6 LBS | DIASTOLIC BLOOD PRESSURE: 80 MMHG

## 2024-05-28 DIAGNOSIS — Z79.01 LONG TERM CURRENT USE OF ANTICOAGULANT THERAPY: Primary | Chronic | ICD-10-CM

## 2024-05-28 DIAGNOSIS — I82.409 DEEP VEIN THROMBOSIS (DVT) OF LOWER EXTREMITY, UNSPECIFIED CHRONICITY, UNSPECIFIED LATERALITY, UNSPECIFIED VEIN (HCC): Chronic | ICD-10-CM

## 2024-05-28 LAB — INR BLD: 2.5

## 2024-05-28 PROCEDURE — 99211 OFF/OP EST MAY X REQ PHY/QHP: CPT | Performed by: FAMILY MEDICINE

## 2024-05-28 PROCEDURE — 85610 PROTHROMBIN TIME: CPT | Performed by: FAMILY MEDICINE

## 2024-05-28 NOTE — PROGRESS NOTES
[facility]  [facility]  ANTICOAGULATION    Referring Provider: Dr Jenkins    GOAL INR: 2.0-3.0    TODAY'S INR: 2.5    WARFARIN Dosage: continue 2.5mg (1/2 tablet) Friday and 5mg (1 tablet) all other days    INR (no units)   Date Value   2024 2.5   2024 2   2024 2.4   2024 3.5   2024 3.2   2024 3.3   2024 3.0       Hemoglobin   Date Value Ref Range Status   2023 12.0 11.9 - 15.1 g/dL Final     Hematocrit   Date Value Ref Range Status   2023 35.9 (L) 36.3 - 47.1 % Final     ALT   Date Value Ref Range Status   2023 38 (H) 5 - 33 U/L Final     AST   Date Value Ref Range Status   2023 39 (H) <32 U/L Final       Medication changes:  Will be increasing Ozempic to 1mg this coming     Notes:    Fingerstick INR drawn per clinic protocol. Patient states no visible blood in urine and no black tarry stool. Denies any missed doses of warfarin. No change in other maintenance medications or in diet. Will recheck INR in 4 weeks. Will be increasing Ozempic to 1mg dose this week. Patient is to have A1C drawn in July. Patient acknowledges working in consult agreement with pharmacist as referred by his/her physician.                  For Pharmacy Admin Tracking Only    Intervention Detail: Adherence Monitorin  Total # of Interventions Recommended: 2  Total # of Interventions Accepted: 2  Time Spent (min): 20      Bridget Wong, ELANA.Ph., 2024,11:19 AM

## 2024-05-28 NOTE — PATIENT INSTRUCTIONS
Continue 2.5mg (1/2 tablet) Friday and 5mg (1 tablet) all other days.  Continue to monitor urine and stool for signs and symptoms of bleeding.   Please notify the clinic of any medication changes.   Please remember to bring all medications (both prescription and OTC) to your next visit.   Kindly notify the clinic if you are unable to make to your next appointment.   Follow warfarin dosing instructions on dosing calendar provided.

## 2024-06-25 ENCOUNTER — HOSPITAL ENCOUNTER (OUTPATIENT)
Dept: PHARMACY | Age: 74
Setting detail: THERAPIES SERIES
Discharge: HOME | End: 2024-06-25
Payer: MEDICARE

## 2024-06-25 VITALS
SYSTOLIC BLOOD PRESSURE: 155 MMHG | WEIGHT: 243.6 LBS | HEART RATE: 64 BPM | BODY MASS INDEX: 39.32 KG/M2 | DIASTOLIC BLOOD PRESSURE: 79 MMHG

## 2024-06-25 DIAGNOSIS — I82.409 DEEP VEIN THROMBOSIS (DVT) OF LOWER EXTREMITY, UNSPECIFIED CHRONICITY, UNSPECIFIED LATERALITY, UNSPECIFIED VEIN (HCC): Chronic | ICD-10-CM

## 2024-06-25 DIAGNOSIS — Z79.01 LONG TERM CURRENT USE OF ANTICOAGULANT THERAPY: Primary | Chronic | ICD-10-CM

## 2024-06-25 LAB — INR BLD: 2.6

## 2024-06-25 PROCEDURE — 99211 OFF/OP EST MAY X REQ PHY/QHP: CPT | Performed by: FAMILY MEDICINE

## 2024-06-25 PROCEDURE — 85610 PROTHROMBIN TIME: CPT | Performed by: FAMILY MEDICINE

## 2024-06-25 NOTE — PROGRESS NOTES
[facility]  Medication [facility]  ANTICOAGULATION    Referring Provider: Dr Jenkins    GOAL INR: 2.0-3.0    TODAY'S INR: 2.6    WARFARIN Dosage: continue 2.5mg F, 5mg all other days    INR (no units)   Date Value   2024 2.6   2024 2.5   2024 2   2024 2.4   2024 3.5   2024 3.2   2024 3.3       Hemoglobin   Date Value Ref Range Status   2023 12.0 11.9 - 15.1 g/dL Final     Hematocrit   Date Value Ref Range Status   2023 35.9 (L) 36.3 - 47.1 % Final     ALT   Date Value Ref Range Status   2023 38 (H) 5 - 33 U/L Final     AST   Date Value Ref Range Status   2023 39 (H) <32 U/L Final       Medication changes:  No change    Notes:    Fingerstick INR drawn per clinic protocol. Patient states no visible blood in urine and no black tarry stool. Denies any missed doses of warfarin. No change in other maintenance medications or in diet. Will recheck INR in 4 weeks. Patient was increased to Ozempic 1mg for 2 weeks but did not tolerate-had significant diarrhea and vomiting.  Dose was returned to 0.5mg weekly and patient is doing well.  Down 5 lbs in 4 weeks.  Patient is scheduled to see endocrinologist in [place] on 24.  Patient is to have blood work prior to appointment with Dr Jenkins on 24, including A1C.   Patient acknowledges working in consult agreement with pharmacist as referred by his/her physician.                  For Pharmacy Admin Tracking Only    Intervention Detail: Adherence Monitorin  Total # of Interventions Recommended: 2  Total # of Interventions Accepted: 2  Time Spent (min): 20      Bridget Wong R.Ph., 2024,2:32 PM

## 2024-06-25 NOTE — PATIENT INSTRUCTIONS
Continue to take warfarin 2.5mg (1/2 tablet) Friday and 5mg (1 tablet) all other days.  Continue to monitor urine and stool for signs and symptoms of bleeding.   Please notify the clinic of any medication changes.   Please remember to bring all medications (both prescription and OTC) to your next visit.   Kindly notify the clinic if you are unable to make to your next appointment.   Follow warfarin dosing instructions on dosing calendar provided.

## 2024-07-23 ENCOUNTER — HOSPITAL ENCOUNTER (OUTPATIENT)
Dept: PHARMACY | Age: 74
Setting detail: THERAPIES SERIES
Discharge: HOME | End: 2024-07-23
Payer: MEDICARE

## 2024-07-23 ENCOUNTER — HOSPITAL ENCOUNTER (OUTPATIENT)
Age: 74
Discharge: HOME | End: 2024-07-23
Payer: MEDICARE

## 2024-07-23 VITALS
SYSTOLIC BLOOD PRESSURE: 161 MMHG | HEART RATE: 74 BPM | BODY MASS INDEX: 39.54 KG/M2 | WEIGHT: 245 LBS | DIASTOLIC BLOOD PRESSURE: 85 MMHG

## 2024-07-23 DIAGNOSIS — Z79.01 LONG TERM CURRENT USE OF ANTICOAGULANT THERAPY: Primary | Chronic | ICD-10-CM

## 2024-07-23 DIAGNOSIS — E78.2 MIXED HYPERLIPIDEMIA: ICD-10-CM

## 2024-07-23 DIAGNOSIS — E11.9 TYPE 2 DIABETES MELLITUS WITHOUT COMPLICATION, WITHOUT LONG-TERM CURRENT USE OF INSULIN (HCC): Chronic | ICD-10-CM

## 2024-07-23 LAB
EST. AVERAGE GLUCOSE BLD GHB EST-MCNC: 154 MG/DL
HBA1C MFR BLD: 7 % (ref 4–6)
INR BLD: 2.6

## 2024-07-23 PROCEDURE — 83036 HEMOGLOBIN GLYCOSYLATED A1C: CPT

## 2024-07-23 PROCEDURE — 80061 LIPID PANEL: CPT

## 2024-07-23 PROCEDURE — 85610 PROTHROMBIN TIME: CPT | Performed by: COUNSELOR

## 2024-07-23 PROCEDURE — 99211 OFF/OP EST MAY X REQ PHY/QHP: CPT | Performed by: COUNSELOR

## 2024-07-23 PROCEDURE — 36415 COLL VENOUS BLD VENIPUNCTURE: CPT

## 2024-07-23 NOTE — PROGRESS NOTES
[facility]  [facility]  ANTICOAGULATION    Referring Provider: Dr Jenkins    GOAL INR: 2-3    TODAY'S INR: 2.6    WARFARIN Dosage: Continue 2.5 mg po every Friday;  5 mg po all other days    INR (no units)   Date Value   2024 2.6   2024 2.6   2024 2.5   2024 2   2024 2.4   2024 3.5   2024 3.2       Hemoglobin   Date Value Ref Range Status   2023 12.0 11.9 - 15.1 g/dL Final     Hematocrit   Date Value Ref Range Status   2023 35.9 (L) 36.3 - 47.1 % Final     ALT   Date Value Ref Range Status   2023 38 (H) 5 - 33 U/L Final     AST   Date Value Ref Range Status   2023 39 (H) <32 U/L Final       Medication changes:  No change    Notes:    Fingerstick INR drawn per clinic protocol. Patient states no visible blood in urine and no black tarry stool. Denies any missed doses of warfarin. No change in other maintenance medications or in diet. Will recheck INR in 4 weeks.    Patient acknowledges working in consult agreement with pharmacist as referred by his/her physician.                  For Pharmacy Admin Tracking Only    Intervention Detail: Adherence Monitorin  Total # of Interventions Recommended: 1  Total # of Interventions Accepted: 1  Time Spent (min): 20    Sussy Herrmann RP

## 2024-07-24 LAB
CHOLEST SERPL-MCNC: 137 MG/DL (ref 0–199)
CHOLESTEROL/HDL RATIO: 4
HDLC SERPL-MCNC: 39 MG/DL
LDLC SERPL CALC-MCNC: 32 MG/DL (ref 0–100)
TRIGL SERPL-MCNC: 331 MG/DL (ref 0–149)
VLDLC SERPL CALC-MCNC: 66 MG/DL

## 2024-07-25 ENCOUNTER — HOSPITAL ENCOUNTER (OUTPATIENT)
Age: 74
Discharge: HOME | End: 2024-07-25
Payer: MEDICARE

## 2024-07-25 DIAGNOSIS — M10.9 GOUTY ARTHRITIS OF RIGHT GREAT TOE: ICD-10-CM

## 2024-07-25 LAB — URATE SERPL-MCNC: 6.9 MG/DL (ref 2.4–5.7)

## 2024-07-25 PROCEDURE — 84550 ASSAY OF BLOOD/URIC ACID: CPT

## 2024-07-25 PROCEDURE — 36415 COLL VENOUS BLD VENIPUNCTURE: CPT

## 2024-08-13 PROBLEM — K62.5 RECTAL BLEEDING: Status: RESOLVED | Noted: 2023-03-26 | Resolved: 2024-08-13

## 2024-08-20 ENCOUNTER — ANTI-COAG VISIT (OUTPATIENT)
Age: 74
End: 2024-08-20
Payer: MEDICARE

## 2024-08-20 VITALS
SYSTOLIC BLOOD PRESSURE: 131 MMHG | BODY MASS INDEX: 39.71 KG/M2 | HEART RATE: 73 BPM | DIASTOLIC BLOOD PRESSURE: 63 MMHG | WEIGHT: 246 LBS

## 2024-08-20 DIAGNOSIS — Z79.01 LONG TERM CURRENT USE OF ANTICOAGULANT THERAPY: Primary | Chronic | ICD-10-CM

## 2024-08-20 LAB
INTERNATIONAL NORMALIZATION RATIO, POC: 2.4
PROTHROMBIN TIME, POC: 0

## 2024-08-20 PROCEDURE — 99211 OFF/OP EST MAY X REQ PHY/QHP: CPT | Performed by: COUNSELOR

## 2024-08-20 PROCEDURE — 85610 PROTHROMBIN TIME: CPT | Performed by: COUNSELOR

## 2024-08-20 NOTE — PROGRESS NOTES
[facility]  Medication Management  ANTICOAGULATION    Referring Provider: Dr. Jenkins    GOAL INR: 2-3    TODAY'S INR: 2.4    WARFARIN Dosage: Continue warfarin 2.5 mg po every Friday; 5 mg po all other days    INR (no units)   Date Value   2024 2.6   2024 2.6   2024 2.5   2024 2   2024 2.4   2024 3.5   2024 3.2     INR,(POC) (no units)   Date Value   2024 2.4       Hemoglobin   Date Value Ref Range Status   2023 12.0 11.9 - 15.1 g/dL Final     Hematocrit   Date Value Ref Range Status   2023 35.9 (L) 36.3 - 47.1 % Final     ALT   Date Value Ref Range Status   2023 38 (H) 5 - 33 U/L Final     AST   Date Value Ref Range Status   2023 39 (H) <32 U/L Final       Medication changes:  No changes    Notes:    Fingerstick INR drawn per clinic protocol. Patient states no visible blood in urine and no black tarry stool. Denies any missed doses of warfarin. No change in other maintenance medications or in diet. Will recheck INR in 6 weeks. Patient acknowledges working in consult agreement with pharmacist as referred by his/her physician.                  For Pharmacy Admin Tracking Only    Intervention Detail: Adherence Monitorin  Total # of Interventions Recommended: 1  Total # of Interventions Accepted: 1  Time Spent (min): 20      Sussy Herrmann RPH

## 2024-10-01 ENCOUNTER — HOSPITAL ENCOUNTER (OUTPATIENT)
Age: 74
Discharge: HOME | End: 2024-10-01
Payer: MEDICARE

## 2024-10-01 ENCOUNTER — ANTI-COAG VISIT (OUTPATIENT)
Age: 74
End: 2024-10-01
Payer: MEDICARE

## 2024-10-01 VITALS
WEIGHT: 245 LBS | HEART RATE: 68 BPM | DIASTOLIC BLOOD PRESSURE: 73 MMHG | BODY MASS INDEX: 39.54 KG/M2 | SYSTOLIC BLOOD PRESSURE: 156 MMHG

## 2024-10-01 DIAGNOSIS — E11.9 TYPE 2 DIABETES MELLITUS WITHOUT COMPLICATION, WITHOUT LONG-TERM CURRENT USE OF INSULIN (HCC): Chronic | ICD-10-CM

## 2024-10-01 DIAGNOSIS — I10 ESSENTIAL HYPERTENSION: Chronic | ICD-10-CM

## 2024-10-01 DIAGNOSIS — Z79.01 LONG TERM CURRENT USE OF ANTICOAGULANT THERAPY: Primary | Chronic | ICD-10-CM

## 2024-10-01 LAB
ANION GAP SERPL CALCULATED.3IONS-SCNC: 13 MMOL/L (ref 9–16)
BUN SERPL-MCNC: 24 MG/DL (ref 8–23)
BUN/CREAT SERPL: 20 (ref 9–20)
CALCIUM SERPL-MCNC: 10.4 MG/DL (ref 8.6–10.4)
CHLORIDE SERPL-SCNC: 99 MMOL/L (ref 98–107)
CO2 SERPL-SCNC: 27 MMOL/L (ref 20–31)
CREAT SERPL-MCNC: 1.2 MG/DL (ref 0.5–0.9)
CREAT UR-MCNC: 34.5 MG/DL (ref 28–217)
EST. AVERAGE GLUCOSE BLD GHB EST-MCNC: 166 MG/DL
GFR, ESTIMATED: 46 ML/MIN/1.73M2
GLUCOSE SERPL-MCNC: 138 MG/DL (ref 74–99)
HBA1C MFR BLD: 7.4 % (ref 4–6)
INTERNATIONAL NORMALIZATION RATIO, POC: 2.1
MICROALBUMIN UR-MCNC: <12 MG/L (ref 0–20)
MICROALBUMIN/CREAT UR-RTO: NORMAL MCG/MG CREAT (ref 0–25)
POTASSIUM SERPL-SCNC: 3.9 MMOL/L (ref 3.7–5.3)
PROTHROMBIN TIME, POC: 0
SODIUM SERPL-SCNC: 139 MMOL/L (ref 136–145)

## 2024-10-01 PROCEDURE — 85610 PROTHROMBIN TIME: CPT

## 2024-10-01 PROCEDURE — 82570 ASSAY OF URINE CREATININE: CPT

## 2024-10-01 PROCEDURE — 82043 UR ALBUMIN QUANTITATIVE: CPT

## 2024-10-01 PROCEDURE — 80048 BASIC METABOLIC PNL TOTAL CA: CPT

## 2024-10-01 PROCEDURE — 99211 OFF/OP EST MAY X REQ PHY/QHP: CPT

## 2024-10-01 PROCEDURE — 36415 COLL VENOUS BLD VENIPUNCTURE: CPT

## 2024-10-01 PROCEDURE — 83036 HEMOGLOBIN GLYCOSYLATED A1C: CPT

## 2024-10-01 NOTE — PROGRESS NOTES
[facility]  Medication Management  ANTICOAGULATION    Referring Provider: Dr. Jenkins     GOAL INR: 2-3     TODAY'S INR: 2.1       WARFARIN Dosage: Continue warfarin 2.5 mg po every Friday; 5 mg po all other days       INR (no units)   Date Value   2024 2.6   2024 2.6   2024 2.5   2024 2   2024 2.4   2024 3.5   2024 3.2     INR,(POC) (no units)   Date Value   2024 2.4       Hemoglobin   Date Value Ref Range Status   2023 12.0 11.9 - 15.1 g/dL Final     Hematocrit   Date Value Ref Range Status   2023 35.9 (L) 36.3 - 47.1 % Final     ALT   Date Value Ref Range Status   2023 38 (H) 5 - 33 U/L Final     AST   Date Value Ref Range Status   2023 39 (H) <32 U/L Final       Medication changes:  none    Notes:    Fingerstick INR drawn per clinic protocol. Patient states no visible blood in urine and no black tarry stool. Denies any missed doses of warfarin. No change in other maintenance medications or in diet. Will recheck INR in 6 weeks. Patient acknowledges working in consult agreement with pharmacist as referred by his/her physician.                  For Pharmacy Admin Tracking Only    Intervention Detail: Adherence Monitorin  Total # of Interventions Recommended: 1  Total # of Interventions Accepted: 1  Time Spent (min): 20      Michelle Goel RPH, PharmD

## 2024-11-12 ENCOUNTER — ANTI-COAG VISIT (OUTPATIENT)
Age: 74
End: 2024-11-12
Payer: MEDICARE

## 2024-11-12 VITALS
BODY MASS INDEX: 39.38 KG/M2 | WEIGHT: 244 LBS | DIASTOLIC BLOOD PRESSURE: 92 MMHG | SYSTOLIC BLOOD PRESSURE: 168 MMHG | HEART RATE: 64 BPM

## 2024-11-12 DIAGNOSIS — Z79.01 LONG TERM CURRENT USE OF ANTICOAGULANT THERAPY: Primary | Chronic | ICD-10-CM

## 2024-11-12 LAB
INTERNATIONAL NORMALIZATION RATIO, POC: 1.9
PROTHROMBIN TIME, POC: 0

## 2024-11-12 PROCEDURE — 99211 OFF/OP EST MAY X REQ PHY/QHP: CPT | Performed by: FAMILY MEDICINE

## 2024-11-12 PROCEDURE — 85610 PROTHROMBIN TIME: CPT | Performed by: FAMILY MEDICINE

## 2024-11-12 NOTE — PATIENT INSTRUCTIONS
Please take 7.5mg (1 1/2 tablets) today then   Continue 2.5mg (1/2 tablet) Friday and 5mg (1 tablet) all other days.  Continue to monitor urine and stool for signs and symptoms of bleeding.   Please notify the clinic of any medication changes.   Please remember to bring all medications (both prescription and OTC) to your next visit.   Kindly notify the clinic if you are unable to make to your next appointment.   Follow warfarin dosing instructions on dosing calendar provided.

## 2024-11-12 NOTE — PROGRESS NOTES
[facility]  [facility]  ANTICOAGULATION    Referring Provider: Dr Jenkins    GOAL INR: 2.0-3.0    TODAY'S INR: 1.9    WARFARIN Dosage: 7.5mg x1 then resume 2.5mg F, 5mg all other days    INR (no units)   Date Value   2024 2.6   2024 2.6   2024 2.5   2024 2   2024 2.4   2024 3.5   2024 3.2     INR,(POC) (no units)   Date Value   2024 1.9   10/01/2024 2.1   2024 2.4       Hemoglobin   Date Value Ref Range Status   2023 12.0 11.9 - 15.1 g/dL Final     Hematocrit   Date Value Ref Range Status   2023 35.9 (L) 36.3 - 47.1 % Final     ALT   Date Value Ref Range Status   2023 38 (H) 5 - 33 U/L Final     AST   Date Value Ref Range Status   2023 39 (H) <32 U/L Final       Medication changes:  Stopped metformin 10/30  Increased Ozempic to 0.75mg for last 2 weeks  Will be increasing Ozempic to 1mg next week      Notes:    Fingerstick INR drawn per clinic protocol. Patient states no visible blood in urine and no black tarry stool. Denies any missed doses of warfarin. No change in other maintenance medications or in diet. Will recheck INR in 6 weeks. Patient acknowledges working in consult agreement with pharmacist as referred by his/her physician.                  For Pharmacy Admin Tracking Only    Intervention Detail: Adherence Monitorin and Dose Adjustment: 1, reason: Therapy Optimization  Total # of Interventions Recommended: 3  Total # of Interventions Accepted: 3  Time Spent (min): 20      ELANA Wolf.Kar., 2024,11:48 AM

## 2024-12-17 ENCOUNTER — ANTI-COAG VISIT (OUTPATIENT)
Age: 74
End: 2024-12-17
Payer: MEDICARE

## 2024-12-17 VITALS — BODY MASS INDEX: 39.22 KG/M2 | WEIGHT: 243 LBS

## 2024-12-17 DIAGNOSIS — Z79.01 LONG TERM CURRENT USE OF ANTICOAGULANT THERAPY: Primary | Chronic | ICD-10-CM

## 2024-12-17 LAB
INTERNATIONAL NORMALIZATION RATIO, POC: 2.8
PROTHROMBIN TIME, POC: 0

## 2024-12-17 PROCEDURE — 99211 OFF/OP EST MAY X REQ PHY/QHP: CPT | Performed by: FAMILY MEDICINE

## 2024-12-17 PROCEDURE — 85610 PROTHROMBIN TIME: CPT | Performed by: FAMILY MEDICINE

## 2024-12-17 RX ORDER — GLIPIZIDE 10 MG/1
10 TABLET, FILM COATED, EXTENDED RELEASE ORAL DAILY
COMMUNITY

## 2024-12-17 NOTE — PROGRESS NOTES
[facility]  Medication Management  ANTICOAGULATION    Referring Provider: Dr Jenkins    GOAL INR: 2.0-3.0    TODAY'S INR: 2.8     WARFARIN Dosage: continue warfarin 2.5mg F, 5mg all other days    INR (no units)   Date Value   2024 2.6   2024 2.6   2024 2.5   2024 2   2024 2.4   2024 3.5   2024 3.2     INR,(POC) (no units)   Date Value   2024 2.8   2024 1.9   10/01/2024 2.1   2024 2.4       Hemoglobin   Date Value Ref Range Status   2023 12.0 11.9 - 15.1 g/dL Final     Hematocrit   Date Value Ref Range Status   2023 35.9 (L) 36.3 - 47.1 % Final     ALT   Date Value Ref Range Status   2023 38 (H) 5 - 33 U/L Final     AST   Date Value Ref Range Status   2023 39 (H) <32 U/L Final       Medication changes:  D/c'd glimepiride  Started glipizide ER 10mg daily    Notes:    Fingerstick INR drawn per clinic protocol. Patient states no visible blood in urine and no black tarry stool. Denies any missed doses of warfarin. No change in other maintenance medications or in diet. Will recheck INR in 6 weeks. Patient acknowledges working in consult agreement with pharmacist as referred by his/her physician.                  For Pharmacy Admin Tracking Only    Intervention Detail: Adherence Monitorin  Total # of Interventions Recommended: 2  Total # of Interventions Accepted: 2  Time Spent (min): 20    Bridget Wong R.Ph., 2024,11:47 AM

## 2024-12-17 NOTE — PATIENT INSTRUCTIONS
Continue taking warfarin 2.5mg (1/2 tablet) Friday and 5mg (1 tablet) all other days.  Continue to monitor urine and stool for signs and symptoms of bleeding.   Please notify the clinic of any medication changes.   Please remember to bring all medications (both prescription and OTC) to your next visit.   Kindly notify the clinic if you are unable to make to your next appointment.   Follow warfarin dosing instructions on dosing calendar provided.

## 2025-01-28 ENCOUNTER — ANTI-COAG VISIT (OUTPATIENT)
Age: 75
End: 2025-01-28
Payer: MEDICARE

## 2025-01-28 VITALS
WEIGHT: 239 LBS | DIASTOLIC BLOOD PRESSURE: 70 MMHG | SYSTOLIC BLOOD PRESSURE: 134 MMHG | BODY MASS INDEX: 38.58 KG/M2 | HEART RATE: 67 BPM

## 2025-01-28 DIAGNOSIS — Z79.01 LONG TERM CURRENT USE OF ANTICOAGULANT THERAPY: Primary | Chronic | ICD-10-CM

## 2025-01-28 LAB
INTERNATIONAL NORMALIZATION RATIO, POC: 2.4
PROTHROMBIN TIME, POC: 0

## 2025-01-28 PROCEDURE — 85610 PROTHROMBIN TIME: CPT | Performed by: COUNSELOR

## 2025-01-28 PROCEDURE — 99211 OFF/OP EST MAY X REQ PHY/QHP: CPT | Performed by: COUNSELOR

## 2025-01-28 NOTE — PROGRESS NOTES
[facility]  [facility]  ANTICOAGULATION    Referring Provider: Dr. Jenkins    GOAL INR: 2-3    TODAY'S INR: 2.4    WARFARIN Dosage: Continue 2.5 mg po every Friday; 5 mg po all other days    INR (no units)   Date Value   2024 2.6   2024 2.6   2024 2.5   2024 2   2024 2.4   2024 3.5   2024 3.2     INR,(POC) (no units)   Date Value   2025 2.4   2024 2.8   2024 1.9   10/01/2024 2.1   2024 2.4       Hemoglobin   Date Value Ref Range Status   2023 12.0 11.9 - 15.1 g/dL Final     Hematocrit   Date Value Ref Range Status   2023 35.9 (L) 36.3 - 47.1 % Final     ALT   Date Value Ref Range Status   2023 38 (H) 5 - 33 U/L Final     AST   Date Value Ref Range Status   2023 39 (H) <32 U/L Final       Medication changes:  No changes    Notes:    Fingerstick INR drawn per clinic protocol. Patient states no visible blood in urine and no black tarry stool. Denies any missed doses of warfarin. No change in other maintenance medications or in diet. Will recheck INR in 6 weeks. Patient acknowledges working in consult agreement with pharmacist as referred by his/her physician.                  For Pharmacy Admin Tracking Only    Intervention Detail: Adherence Monitorin  Total # of Interventions Recommended: 1  Total # of Interventions Accepted: 1  Time Spent (min): 20      Sussy Herrmann RP

## 2025-02-11 ENCOUNTER — HOSPITAL ENCOUNTER (OUTPATIENT)
Dept: WOMENS IMAGING | Age: 75
Discharge: HOME | End: 2025-02-13
Payer: MEDICARE

## 2025-02-11 ENCOUNTER — HOSPITAL ENCOUNTER (OUTPATIENT)
Age: 75
Discharge: HOME | End: 2025-02-11
Payer: MEDICARE

## 2025-02-11 VITALS — WEIGHT: 240 LBS | HEIGHT: 66 IN | BODY MASS INDEX: 38.57 KG/M2

## 2025-02-11 DIAGNOSIS — Z12.31 BREAST CANCER SCREENING BY MAMMOGRAM: ICD-10-CM

## 2025-02-11 PROCEDURE — 77063 BREAST TOMOSYNTHESIS BI: CPT

## 2025-03-11 ENCOUNTER — APPOINTMENT (OUTPATIENT)
Age: 75
End: 2025-03-11
Payer: MEDICARE

## 2025-03-12 DIAGNOSIS — I26.99 PULMONARY EMBOLISM, UNSPECIFIED CHRONICITY, UNSPECIFIED PULMONARY EMBOLISM TYPE, UNSPECIFIED WHETHER ACUTE COR PULMONALE PRESENT (HCC): Primary | ICD-10-CM

## 2025-03-12 DIAGNOSIS — Z79.01 LONG TERM CURRENT USE OF ANTICOAGULANT THERAPY: ICD-10-CM

## 2025-03-18 ENCOUNTER — ANTI-COAG VISIT (OUTPATIENT)
Age: 75
End: 2025-03-18
Payer: MEDICARE

## 2025-03-18 VITALS
HEIGHT: 66 IN | SYSTOLIC BLOOD PRESSURE: 131 MMHG | BODY MASS INDEX: 38.25 KG/M2 | DIASTOLIC BLOOD PRESSURE: 66 MMHG | HEART RATE: 99 BPM | WEIGHT: 238 LBS

## 2025-03-18 DIAGNOSIS — Z79.01 LONG TERM CURRENT USE OF ANTICOAGULANT THERAPY: Primary | Chronic | ICD-10-CM

## 2025-03-18 LAB
INTERNATIONAL NORMALIZATION RATIO, POC: 2
PROTHROMBIN TIME, POC: 0

## 2025-03-18 PROCEDURE — 99211 OFF/OP EST MAY X REQ PHY/QHP: CPT | Performed by: FAMILY MEDICINE

## 2025-03-18 PROCEDURE — 85610 PROTHROMBIN TIME: CPT | Performed by: FAMILY MEDICINE

## 2025-03-18 NOTE — PROGRESS NOTES
[facility]  Medication Management  ANTICOAGULATION    Referring Provider: Dr Jenkins    GOAL INR: 2.0-3.0    TODAY'S INR: 2.0    WARFARIN Dosage: continue 2.5mg (1/2 tablet) F, 5mg (1 tablet) all other days    INR (no units)   Date Value   2024 2.6   2024 2.6   2024 2.5   2024 2   2024 2.4   2024 3.5   2024 3.2     INR,(POC) (no units)   Date Value   2025 2.0   2025 2.4   2024 2.8   2024 1.9   10/01/2024 2.1   2024 2.4       Hemoglobin   Date Value Ref Range Status   2023 12.0 11.9 - 15.1 g/dL Final     Hematocrit   Date Value Ref Range Status   2023 35.9 (L) 36.3 - 47.1 % Final     ALT   Date Value Ref Range Status   2023 38 (H) 5 - 33 U/L Final     AST   Date Value Ref Range Status   2023 39 (H) <32 U/L Final       Medication changes:  No change    Notes:    Fingerstick INR drawn per clinic protocol. Patient states no visible blood in urine and no black tarry stool. Denies any missed doses of warfarin. No change in other maintenance medications or in diet. Will recheck INR in 5 weeks. Patient acknowledges working in consult agreement with pharmacist as referred by his/her physician.                  For Pharmacy Admin Tracking Only    Intervention Detail: Adherence Monitorin  Total # of Interventions Recommended: 2  Total # of Interventions Accepted: 2  Time Spent (min): 20      ELANA Wolf.Ph., 3/18/2025,11:44 AM

## 2025-04-15 ENCOUNTER — ANTI-COAG VISIT (OUTPATIENT)
Age: 75
End: 2025-04-15
Payer: MEDICARE

## 2025-04-15 VITALS
BODY MASS INDEX: 38.25 KG/M2 | HEIGHT: 66 IN | WEIGHT: 238 LBS | SYSTOLIC BLOOD PRESSURE: 122 MMHG | DIASTOLIC BLOOD PRESSURE: 77 MMHG

## 2025-04-15 DIAGNOSIS — Z79.01 LONG TERM CURRENT USE OF ANTICOAGULANT THERAPY: Primary | Chronic | ICD-10-CM

## 2025-04-15 LAB
INTERNATIONAL NORMALIZATION RATIO, POC: 2
PROTHROMBIN TIME, POC: 0

## 2025-04-15 PROCEDURE — 85610 PROTHROMBIN TIME: CPT | Performed by: FAMILY MEDICINE

## 2025-04-15 PROCEDURE — 99211 OFF/OP EST MAY X REQ PHY/QHP: CPT | Performed by: FAMILY MEDICINE

## 2025-04-15 NOTE — PROGRESS NOTES
[facility]  [facility]  ANTICOAGULATION    Referring Provider: Dr Jenkins    GOAL INR: 2.0-3.0    TODAY'S INR: 2.0    WARFARIN Dosage: continue warfarin 2.5mg (1/2 tablet) F, 5mg (1 tablet) all other days    INR (no units)   Date Value   2024 2.6   2024 2.6   2024 2.5   2024 2   2024 2.4   2024 3.5   2024 3.2     INR,(POC) (no units)   Date Value   04/15/2025 2.0   2025 2.0   2025 2.4   2024 2.8   2024 1.9   10/01/2024 2.1   2024 2.4       Hemoglobin   Date Value Ref Range Status   2023 12.0 11.9 - 15.1 g/dL Final     Hematocrit   Date Value Ref Range Status   2023 35.9 (L) 36.3 - 47.1 % Final     ALT   Date Value Ref Range Status   2023 38 (H) 5 - 33 U/L Final     AST   Date Value Ref Range Status   2023 39 (H) <32 U/L Final       Medication changes:  No change    Notes:    Fingerstick INR drawn per clinic protocol. Patient states no visible blood in urine and no black tarry stool. Denies any missed doses of warfarin. No change in other maintenance medications or in diet. Will recheck INR in 6 weeks. Patient acknowledges working in consult agreement with pharmacist as referred by his/her physician.                  For Pharmacy Admin Tracking Only    Intervention Detail: Adherence Monitorin  Total # of Interventions Recommended: 2  Total # of Interventions Accepted: 2  Time Spent (min): 20    Bridget Wong R.Ph., 4/15/2025,11:30 AM

## 2025-04-15 NOTE — PATIENT INSTRUCTIONS
Continue to take warfarin 2.5mg (1/2 tablet) Friday and 5mg (1 tablet) all other days  Continue to monitor urine and stool for signs and symptoms of bleeding.   Please notify the clinic of any medication changes.   Please remember to bring all medications (both prescription and OTC) to your next visit.   Kindly notify the clinic if you are unable to make to your next appointment.   Follow warfarin dosing instructions on dosing calendar provided.

## 2025-05-27 ENCOUNTER — ANTI-COAG VISIT (OUTPATIENT)
Age: 75
End: 2025-05-27
Payer: MEDICARE

## 2025-05-27 VITALS
DIASTOLIC BLOOD PRESSURE: 72 MMHG | BODY MASS INDEX: 38.73 KG/M2 | HEART RATE: 70 BPM | SYSTOLIC BLOOD PRESSURE: 130 MMHG | HEIGHT: 66 IN | WEIGHT: 241 LBS

## 2025-05-27 DIAGNOSIS — Z79.01 LONG TERM CURRENT USE OF ANTICOAGULANT THERAPY: Primary | Chronic | ICD-10-CM

## 2025-05-27 LAB
INTERNATIONAL NORMALIZATION RATIO, POC: 1.9
PROTHROMBIN TIME, POC: 0

## 2025-05-27 PROCEDURE — 99211 OFF/OP EST MAY X REQ PHY/QHP: CPT | Performed by: FAMILY MEDICINE

## 2025-05-27 PROCEDURE — 85610 PROTHROMBIN TIME: CPT | Performed by: FAMILY MEDICINE

## 2025-05-27 NOTE — PROGRESS NOTES
[facility]  [facility]  ANTICOAGULATION    Referring Provider: Dr Jenkins    GOAL INR: 2.0-3.0    TODAY'S INR: 1.9    WARFARIN Dosage: 7.5mg x1 then resume 2.5mg F, 5mg all other days    INR (no units)   Date Value   2024 2.6   2024 2.6   2024 2.5   2024 2   2024 2.4   2024 3.5   2024 3.2     INR,(POC) (no units)   Date Value   2025 1.9   04/15/2025 2.0   2025 2.0   2025 2.4   2024 2.8   2024 1.9   10/01/2024 2.1       Hemoglobin   Date Value Ref Range Status   2023 12.0 11.9 - 15.1 g/dL Final     Hematocrit   Date Value Ref Range Status   2023 35.9 (L) 36.3 - 47.1 % Final     ALT   Date Value Ref Range Status   2023 38 (H) 5 - 33 U/L Final     AST   Date Value Ref Range Status   2023 39 (H) <32 U/L Final       Medication changes:  No change    Notes:    Fingerstick INR drawn per clinic protocol. Patient states no visible blood in urine and no black tarry stool. Denies any missed doses of warfarin. No change in other maintenance medications or in diet. Will recheck INR in 6 weeks. Patient acknowledges working in consult agreement with pharmacist as referred by his/her physician.                  For Pharmacy Admin Tracking Only    Intervention Detail: Adherence Monitorin and Dose Adjustment: 1, reason: Therapy Optimization  Total # of Interventions Recommended: 3  Total # of Interventions Accepted: 3  Time Spent (min): 20      Bridget Wong R.Ph., 2025,11:22 AM

## 2025-05-27 NOTE — PATIENT INSTRUCTIONS
Please take warfarin 7.5mg (1 1/2 tablets) today then resume warfarin 2.5mg (1/2 tablet) Friday and 5mg (1 tablet) all other days  Continue to monitor urine and stool for signs and symptoms of bleeding.   Please notify the clinic of any medication changes.   Please remember to bring all medications (both prescription and OTC) to your next visit.   Kindly notify the clinic if you are unable to make to your next appointment.   Follow warfarin dosing instructions on dosing calendar provided.

## 2025-06-05 ENCOUNTER — HOSPITAL ENCOUNTER (OUTPATIENT)
Age: 75
Discharge: HOME | End: 2025-06-05
Payer: MEDICARE

## 2025-06-05 DIAGNOSIS — E11.9 TYPE 2 DIABETES MELLITUS WITHOUT COMPLICATION, WITHOUT LONG-TERM CURRENT USE OF INSULIN (HCC): Chronic | ICD-10-CM

## 2025-06-05 LAB
EST. AVERAGE GLUCOSE BLD GHB EST-MCNC: 169 MG/DL
HBA1C MFR BLD: 7.5 % (ref 4–6)

## 2025-06-05 PROCEDURE — 83036 HEMOGLOBIN GLYCOSYLATED A1C: CPT

## 2025-06-05 PROCEDURE — 36415 COLL VENOUS BLD VENIPUNCTURE: CPT

## 2025-07-15 ENCOUNTER — ANTI-COAG VISIT (OUTPATIENT)
Age: 75
End: 2025-07-15
Payer: MEDICARE

## 2025-07-15 VITALS
BODY MASS INDEX: 38.87 KG/M2 | SYSTOLIC BLOOD PRESSURE: 117 MMHG | WEIGHT: 239 LBS | HEART RATE: 73 BPM | DIASTOLIC BLOOD PRESSURE: 72 MMHG

## 2025-07-15 DIAGNOSIS — Z79.01 LONG TERM CURRENT USE OF ANTICOAGULANT THERAPY: Primary | Chronic | ICD-10-CM

## 2025-07-15 LAB
INTERNATIONAL NORMALIZATION RATIO, POC: 2.1
PROTHROMBIN TIME, POC: 0

## 2025-07-15 PROCEDURE — 99211 OFF/OP EST MAY X REQ PHY/QHP: CPT | Performed by: COUNSELOR

## 2025-07-15 PROCEDURE — 85610 PROTHROMBIN TIME: CPT | Performed by: COUNSELOR

## 2025-08-26 ENCOUNTER — ANTI-COAG VISIT (OUTPATIENT)
Age: 75
End: 2025-08-26
Payer: MEDICARE

## 2025-08-26 VITALS
HEART RATE: 69 BPM | WEIGHT: 241 LBS | DIASTOLIC BLOOD PRESSURE: 90 MMHG | BODY MASS INDEX: 39.19 KG/M2 | SYSTOLIC BLOOD PRESSURE: 151 MMHG

## 2025-08-26 DIAGNOSIS — Z79.01 LONG TERM CURRENT USE OF ANTICOAGULANT THERAPY: Primary | Chronic | ICD-10-CM

## 2025-08-26 LAB
INTERNATIONAL NORMALIZATION RATIO, POC: 2.2
PROTHROMBIN TIME, POC: NORMAL

## 2025-08-26 PROCEDURE — 99211 OFF/OP EST MAY X REQ PHY/QHP: CPT | Performed by: COUNSELOR

## 2025-08-26 PROCEDURE — 85610 PROTHROMBIN TIME: CPT | Performed by: COUNSELOR
